# Patient Record
Sex: MALE | Race: BLACK OR AFRICAN AMERICAN | NOT HISPANIC OR LATINO | Employment: OTHER | URBAN - METROPOLITAN AREA
[De-identification: names, ages, dates, MRNs, and addresses within clinical notes are randomized per-mention and may not be internally consistent; named-entity substitution may affect disease eponyms.]

---

## 2019-01-01 ENCOUNTER — HOSPITAL ENCOUNTER (OUTPATIENT)
Facility: HOSPITAL | Age: 69
Discharge: HOME | End: 2019-12-12
Attending: OPHTHALMOLOGY | Admitting: OPHTHALMOLOGY
Payer: MEDICARE

## 2019-01-01 ENCOUNTER — HOSPITAL ENCOUNTER (OUTPATIENT)
Dept: PREADMISSION TESTING | Facility: HOSPITAL | Age: 69
Discharge: HOME | End: 2019-12-09
Attending: OPHTHALMOLOGY
Payer: MEDICARE

## 2019-01-01 ENCOUNTER — ANESTHESIA (OUTPATIENT)
Dept: SURGERY | Facility: HOSPITAL | Age: 69
End: 2019-01-01
Payer: MEDICARE

## 2019-01-01 ENCOUNTER — ANESTHESIA EVENT (OUTPATIENT)
Dept: SURGERY | Facility: HOSPITAL | Age: 69
End: 2019-01-01
Payer: MEDICARE

## 2019-01-01 VITALS
RESPIRATION RATE: 18 BRPM | HEART RATE: 70 BPM | DIASTOLIC BLOOD PRESSURE: 85 MMHG | OXYGEN SATURATION: 99 % | SYSTOLIC BLOOD PRESSURE: 168 MMHG

## 2019-01-01 DIAGNOSIS — H26.8 OTHER CATARACT OF RIGHT EYE: Primary | ICD-10-CM

## 2019-01-01 DIAGNOSIS — H26.9 CATARACT, RIGHT EYE: ICD-10-CM

## 2019-01-01 PROCEDURE — 36000707: Performed by: OPHTHALMOLOGY

## 2019-01-01 PROCEDURE — 25000003 PHARM REV CODE 250

## 2019-01-01 PROCEDURE — 71000033 HC RECOVERY, INTIAL HOUR: Performed by: OPHTHALMOLOGY

## 2019-01-01 PROCEDURE — 37000008 HC ANESTHESIA 1ST 15 MINUTES: Performed by: OPHTHALMOLOGY

## 2019-01-01 PROCEDURE — 25000003 PHARM REV CODE 250: Performed by: OPHTHALMOLOGY

## 2019-01-01 PROCEDURE — 63600175 PHARM REV CODE 636 W HCPCS: Performed by: NURSE ANESTHETIST, CERTIFIED REGISTERED

## 2019-01-01 PROCEDURE — 37000009 HC ANESTHESIA EA ADD 15 MINS: Performed by: OPHTHALMOLOGY

## 2019-01-01 PROCEDURE — V2632 POST CHMBR INTRAOCULAR LENS: HCPCS | Performed by: OPHTHALMOLOGY

## 2019-01-01 PROCEDURE — 36000706: Performed by: OPHTHALMOLOGY

## 2019-01-01 PROCEDURE — 00142 ANES PX ON EYE LENS SURGERY: CPT | Mod: QZ,P3 | Performed by: NURSE ANESTHETIST, CERTIFIED REGISTERED

## 2019-01-01 DEVICE — IMPLANTABLE DEVICE: Type: IMPLANTABLE DEVICE | Site: EYE | Status: FUNCTIONAL

## 2019-01-01 RX ORDER — DIFLUPREDNATE OPHTHALMIC 0.5 MG/ML
1 EMULSION OPHTHALMIC
COMMUNITY

## 2019-01-01 RX ORDER — TETRACAINE HYDROCHLORIDE 5 MG/ML
1 SOLUTION OPHTHALMIC
Status: DISCONTINUED | OUTPATIENT
Start: 2019-01-01 | End: 2019-01-01 | Stop reason: HOSPADM

## 2019-01-01 RX ORDER — TETRACAINE HYDROCHLORIDE 5 MG/ML
1 SOLUTION OPHTHALMIC
Status: CANCELLED | OUTPATIENT
Start: 2019-01-01

## 2019-01-01 RX ORDER — AMLODIPINE AND BENAZEPRIL HYDROCHLORIDE 10; 20 MG/1; MG/1
1 CAPSULE ORAL DAILY
COMMUNITY

## 2019-01-01 RX ORDER — LIDOCAINE HCL/PF 100 MG/5ML
SYRINGE (ML) INTRAVENOUS
Status: DISCONTINUED | OUTPATIENT
Start: 2019-01-01 | End: 2019-01-01

## 2019-01-01 RX ORDER — LIDOCAINE HYDROCHLORIDE 20 MG/ML
JELLY TOPICAL ONCE
Status: COMPLETED | OUTPATIENT
Start: 2019-01-01 | End: 2019-01-01

## 2019-01-01 RX ORDER — PHENYLEPHRINE HYDROCHLORIDE 25 MG/ML
1 SOLUTION/ DROPS OPHTHALMIC
Status: CANCELLED | OUTPATIENT
Start: 2019-01-01 | End: 2019-01-01

## 2019-01-01 RX ORDER — PROPOFOL 10 MG/ML
INJECTION, EMULSION INTRAVENOUS
Status: DISCONTINUED | OUTPATIENT
Start: 2019-01-01 | End: 2019-01-01

## 2019-01-01 RX ORDER — CYCLOPENTOLATE HYDROCHLORIDE 10 MG/ML
1 SOLUTION/ DROPS OPHTHALMIC
Status: COMPLETED | OUTPATIENT
Start: 2019-01-01 | End: 2019-01-01

## 2019-01-01 RX ORDER — PHENYLEPHRINE HYDROCHLORIDE 25 MG/ML
1 SOLUTION/ DROPS OPHTHALMIC
Status: COMPLETED | OUTPATIENT
Start: 2019-01-01 | End: 2019-01-01

## 2019-01-01 RX ORDER — LIDOCAINE HYDROCHLORIDE 20 MG/ML
JELLY TOPICAL ONCE
Status: CANCELLED | OUTPATIENT
Start: 2019-01-01

## 2019-01-01 RX ORDER — CYCLOPENTOLATE HYDROCHLORIDE 10 MG/ML
1 SOLUTION/ DROPS OPHTHALMIC
Status: CANCELLED | OUTPATIENT
Start: 2019-01-01 | End: 2019-01-01

## 2019-01-01 RX ADMIN — CYCLOPENTOLATE HYDROCHLORIDE 1 DROP: 10 SOLUTION/ DROPS OPHTHALMIC at 08:12

## 2019-01-01 RX ADMIN — LIDOCAINE HYDROCHLORIDE 50 MG: 20 INJECTION, SOLUTION INTRAVENOUS at 10:12

## 2019-01-01 RX ADMIN — TETRACAINE HYDROCHLORIDE 1 DROP: 5 SOLUTION OPHTHALMIC at 08:12

## 2019-01-01 RX ADMIN — PHENYLEPHRINE HYDROCHLORIDE 1 DROP: 25 SOLUTION/ DROPS OPHTHALMIC at 08:12

## 2019-01-01 RX ADMIN — LIDOCAINE HYDROCHLORIDE 10 ML: 20 JELLY TOPICAL at 08:12

## 2019-01-01 RX ADMIN — PROPOFOL 30 MG: 10 INJECTION, EMULSION INTRAVENOUS at 10:12

## 2019-12-09 NOTE — DISCHARGE INSTRUCTIONS
Pre Admit Instructions    Day and Date of your Surgery :   Thursday 12/12/19    · Call your doctor if you become ill before your surgery  · Someone will call you between 1 p.m. And 5 p.m.the workday before the procedure to give you an arrival time       - 7 a.m. To 5 p.m. Enter through Patient Registration Main Lobby  · You must have a responsible  to bring you home    Do NOT eat or drink anything   past midnight before your procedure day    Please    · Do not wear makeup, jewelry, nail polish or body piercings  · Bring containers/solution for contacts, dentures, bridges - these and hearing aids will be removed before your procedure  · Do not bring cash, jewelry or valuables the day of your procedure   · No smoking at least 24 hours before your procedure  · Wear clothing that is comfortable and easy to take off and put on  · Do NOT shave for at least 5 days before your surgery      Information About Your Procedure  We would like to welcome you to [facility].      1. Cafeteria Meals: 7am to 10am; 11am to 1:30 pm; Dinner/Supper must may be ordered between 11:00 am and 4 pm from the [facility] [employer] After [employer] Menu. Food will be available to  between 5 pm and 6 pm. The kitchen phone extension is [phone].  2. After Checking in someone will escort you to 4th floor  3. Wear loose fitting clothes that button down the front.  The hospital will provide a gown.  4. Wash hair the night before surgery with your regular soap/shampoo.  5. [facility] is a non-smoking hospital.  NO SMOKING is allowed inside or outside of the hospital.  6. The nurse will review and follow the doctors orders.  The nurse will check vital signs, lungs and heart.  A nurse will begin putting eye drops every 10 minutes until the eyes are dilated (30 mins - 1 hr).    INFORMATION ON THE SURGERY  · The Nurse Vaca from surgery will bring you to a holding area by wheel chair.  · In the holding area, an IV,  heart monitor, and oxygen will be started.  · Medication jelly will be put in your eye to numb your eye and then surgery will begin.  · You will be awake during the surgery, so if pain is felt, let the physician know at that time.  · After surgery let someone know if you feel any pain.  · The physician will put a clear patch over your eye to allow time to heal.  The physician will remove patch at the office the following day.  · The IV will be discontinued after the procedure.  · You will return upstairs after the surgery.   INFORMATION After the Surgery  · Notify nurse if you feel any eye pain, headache, weakness or dizziness.  · You must have a nurse present for the first time up to the bathroom.  · Instructions will be given on home care at time of discharge.  · The following activities cause increased pressure to the surgery eye.  Please do not lift, strain, bend down to tie shoelaces,  objects on the floor, or lie on the side of the surgery until the physician instructs you differently.  · Follow home care instructions recommended by:     Dr. Holcomb  - GOALS FOR ONE DAY SURGERY:  Pain controlled by oral medication; tolerating liquids well; able to walk without feeling dizzy or weak; able to urinate without difficulty.  - PATIENT WILL NEED SOMEONE TO DRIVE HIM/HER HOME FOLLOWING SURGERY.  We do want your stay to be as pleasant as possible.  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

## 2019-12-12 PROBLEM — H26.9 CATARACT, RIGHT EYE: Status: ACTIVE | Noted: 2019-01-01

## 2019-12-12 PROBLEM — H26.9 CATARACT, RIGHT EYE: Status: RESOLVED | Noted: 2019-01-01 | Resolved: 2019-01-01

## 2019-12-12 NOTE — OP NOTE
DATE OF PROCEDURE:  12/12/2019    PREOPERATIVE DIAGNOSIS:  Cataract, right eye.    POSTOPERATIVE DIAGNOSIS:  Cataract, right eye.    OPERATIVE PROCEDURES:  Phacoemulsification of right cataract and foldable   posterior chamber lens implant, right eye.    SURGEON:  Alcides Holcomb M.D.    ANESTHESIA:  Topical.    PROCEDURE IN DETAIL:  The patient is brought to the Operative Suite and placed   on a table in the supine position.  Prior to arrival, the right pupil had been   dilated with Jaison-Synephrine and Cyclogyl.  Topical anesthesia had been achieved   using 2% Xylocaine Gel applied 15 minutes prior to arrival.  This was augmented   by more 2% Xylocaine Gel on the table.  The right hemifacial area was prepped   and draped in the usual sterile fashion.  The remainder of the procedure took   place using a Zeiss operating microscope.    A lid speculum was placed under the right lid and the lid was retracted.  The   steepest corneal axis had been previously determined and a clear corneal   incision was made at this site.  A limbal clear corneal groove set at 200   microns was made for a length of 2.75 mm.  A corneal tunnel was made into clear   cornea and the anterior chamber was entered with a keratome for a width of 2.75   mm.  The anterior chamber was formed with viscoelastic.  An anterior   capsulorrhexis was carried out with a bent 25-gauge needle.  Hydrodissection of   the lens nucleus was achieved.  The lens nucleus was removed with   phacoemulsification.  The remainder of the cortical cataract material was   aspirated with an irrigation and aspiration tip.  Viscoelastic was used to fill   the anterior chamber and capsular bag.  An intraocular lens was inserted into   the capsular bag in the usual fashion.  The viscoelastic was aspirated and the   anterior chamber was again reformed with balanced salt solution.  The corneal   wound was tested to be free of leak.  After the lid speculum was removed, the   eye was  inspected and the anterior chamber was deep and formed, and the wound   was sealed.  The patient was referred to the Recovery Room in apparent good   condition having tolerated the procedure well.      NICOLASA/IN  dd: 12/12/2019 10:37:52 (CST)  td: 12/12/2019 12:25:39 (CST)  Doc ID   #[id number]  Job ID #[id number]    CC:

## 2019-12-12 NOTE — BRIEF OP NOTE
[facility]  Brief Operative Note     SUMMARY     Surgery Date: 12/12/2019     Surgeon(s) and Role:     * Alcides Holcomb MD - Primary    Assisting Surgeon: None    Pre-op Diagnosis:  Cataract, right eye [H26.9]    Post-op Diagnosis:  Post-Op Diagnosis Codes:     * Cataract, right eye [H26.9]    Procedure(s) (LRB):  PHACOEMULSIFICATION, CATARACT (Right)  EXTRACTION, CATARACT, WITH IOL INSERTION (Right)    Anesthesia: Monitor Anesthesia Care    Description of the findings of the procedure:     Findings/Key Components:     Estimated Blood Loss: * No values recorded between 12/12/2019 10:19 AM and 12/12/2019 10:38 AM *         Specimens:   Specimen (12h ago, onward)    None          Discharge Note    SUMMARY     Admit Date: 12/12/2019    Discharge Date and Time:  12/12/2019 10:40 AM    Hospital Course (synopsis of major diagnoses, care, treatment, and services provided during the course of the hospital stay):      Final Diagnosis: Post-Op Diagnosis Codes:     * Cataract, right eye [H26.9]    Disposition: Home or Self Care    Follow Up/Patient Instructions:     Medications:  Reconciled Home Medications:      Medication List      CONTINUE taking these medications    amlodipine-benazepril 10-20mg 10-20 mg per capsule  Commonly known as:  LOTREL  Take 1 capsule by mouth once daily.     CIPROFLOXACIN HCL OPHT  Apply to eye 4 (four) times daily.     Durezol 0.05 % Drop ophthalmic solution  Generic drug:  difluprednate  1 drop.     Ilevro 0.3 % Drps  Generic drug:  nepafenac  Apply to eye every evening.          No discharge procedures on file.

## 2019-12-12 NOTE — DISCHARGE INSTRUCTIONS
POST OP EYES    DAY OF SURGERY    Diet:  Feel free to eat all your favorite foods. No cooking the day of surgery.    Alcohol:  Yes, you may have one (1) cocktail with dinner.    Activity:  Make today a day of leisure, watch TV, take a walk, ride in the car,  sit on the porch. Rest frequently.    You may bend a bit to brush your teeth,  the newspaper, or eat your meals, but refrain from staying bent over for a long period of  time.  No heavy lifting/straining.     You may take a bath the day of surgery- NO SHOWER.     MD will advise on when you may resume driving.    SLEEP:  Sleep on two pillows the night of surgery. You may sleep on the      NON-OPERATIVE side. Wear eye shield at night and for naps.    MEDICATIONS:   Your home medications are to be taken as instructed by Dr. Gonsalves.  Please wait until after the first post op day before resuming blood thinners (Persantine, Ibuprofen, Coumadin, Aspirin, etc.)    Pain:  You may take two (2) Extra Strength Tylenol every four (4) hours.    Return to hospital:    1. Any obvious bleeding .    2. Temperature over 100.4 F    3. Pain not relieved by Tylenol.    4. Drainage or painful eye.    5. Redness or swelling around the eye      Remember to protect the eye by wearing the shield for at least one week at bedtime and by wearing your glasses or shield during the day.    Your follow up appointment is scheduled for tomorrow 12/13/19 at 815am  Please bring your discharge paperwork with you to your appointment.

## 2019-12-12 NOTE — ANESTHESIA POSTPROCEDURE EVALUATION
Anesthesia Post Evaluation    Patient: Atul Mendoza    Procedure(s) Performed: Procedure(s) (LRB):  PHACOEMULSIFICATION, CATARACT (Right)  EXTRACTION, CATARACT, WITH IOL INSERTION (Right)    Final Anesthesia Type: MAC    Patient location during evaluation: PACU  Patient participation: Yes- Able to Participate  Level of consciousness: awake and alert, oriented and awake  Post-procedure vital signs: reviewed and stable  Pain management: adequate  Airway patency: patent    PONV status at discharge: No PONV  Anesthetic complications: no      Cardiovascular status: blood pressure returned to baseline, hemodynamically stable and stable  Respiratory status: unassisted, spontaneous ventilation and room air  Hydration status: euvolemic  Follow-up not needed.          Vitals Value Taken Time   /84 12/12/2019  8:36 AM   Temp 36 12/12/2019 10:38 AM   Pulse 68 12/12/2019  8:27 AM   Resp 18 12/12/2019  8:27 AM   SpO2 94 % 12/12/2019  8:27 AM         No case tracking events are documented in the log.      Pain/Mag Score: Mag Score: 10 (12/12/2019  8:58 AM)

## 2019-12-12 NOTE — H&P
The patient has been examined and the H&P has been reviewed:  I concur with the findings and no changes have occurred since H&P was written.121/2/2019      Anesthesia/Surgery risks, benefits and alternative options discussed and understood by patient/family.

## 2019-12-12 NOTE — ANESTHESIA PREPROCEDURE EVALUATION
12/12/2019  Atul Mendoza is a 68 y.o., male.    Anesthesia Evaluation    I have reviewed the Patient Summary Reports.    I have reviewed the Nursing Notes.   I have reviewed the Medications.     Review of Systems  Anesthesia Hx:  No problems with previous Anesthesia    Social:  Non-Smoker, No Alcohol Use    Hematology/Oncology:  Hematology Normal   Oncology Normal     EENT/Dental:EENT/Dental Normal   Cardiovascular:   Exercise tolerance: good Hypertension    Pulmonary:  Pulmonary Normal    Renal/:  Renal/ Normal     Hepatic/GI:  Hepatic/GI Normal    Musculoskeletal:  Musculoskeletal Normal    Neurological:  Neurology Normal    Endocrine:  Endocrine Normal    Dermatological:  Skin Normal    Psych:  Psychiatric Normal           Physical Exam  General:  Well nourished    Airway/Jaw/Neck:  Airway Findings: Mouth Opening: Normal Tongue: Normal  General Airway Assessment: Adult  Mallampati: II  Jaw/Neck Findings:  Neck ROM: Normal ROM      Dental:  Dental Findings: In tact        Mental Status:  Mental Status Findings:  Cooperative         Anesthesia Plan  Type of Anesthesia, risks & benefits discussed:  Anesthesia Type:  MAC, general  Patient's Preference:   Intra-op Monitoring Plan: standard ASA monitors  Intra-op Monitoring Plan Comments:   Post Op Pain Control Plan: multimodal analgesia  Post Op Pain Control Plan Comments:   Induction:   IV  Beta Blocker:  Patient is not currently on a Beta-Blocker (No further documentation required).       Informed Consent: Patient understands risks and agrees with Anesthesia plan.  Questions answered. Anesthesia consent signed with patient.  ASA Score: 3     Day of Surgery Review of History & Physical: I have interviewed and examined the patient. I have reviewed the patient's H&P dated: 12/12/19. There are no significant changes.  H&P update referred to the surgeon.          Ready For Surgery From Anesthesia Perspective.

## 2020-01-01 ENCOUNTER — HOSPITAL ENCOUNTER (INPATIENT)
Facility: HOSPITAL | Age: 70
LOS: 8 days | DRG: 870 | End: 2020-08-12
Attending: PSYCHIATRY & NEUROLOGY | Admitting: PSYCHIATRY & NEUROLOGY
Payer: MEDICARE

## 2020-01-01 ENCOUNTER — HOSPITAL ENCOUNTER (EMERGENCY)
Facility: HOSPITAL | Age: 70
Discharge: HOME | End: 2020-07-15
Attending: SURGERY
Payer: MEDICARE

## 2020-01-01 VITALS
HEART RATE: 82 BPM | OXYGEN SATURATION: 97 % | SYSTOLIC BLOOD PRESSURE: 175 MMHG | TEMPERATURE: 100 F | WEIGHT: 171.94 LBS | DIASTOLIC BLOOD PRESSURE: 85 MMHG | RESPIRATION RATE: 18 BRPM

## 2020-01-01 VITALS
OXYGEN SATURATION: 74 % | DIASTOLIC BLOOD PRESSURE: 32 MMHG | RESPIRATION RATE: 7 BRPM | HEIGHT: 72 IN | HEART RATE: 60 BPM | TEMPERATURE: 96 F | BODY MASS INDEX: 23.68 KG/M2 | WEIGHT: 174.81 LBS | SYSTOLIC BLOOD PRESSURE: 54 MMHG

## 2020-01-01 DIAGNOSIS — A41.9 SEVERE SEPSIS: ICD-10-CM

## 2020-01-01 DIAGNOSIS — I63.81 LACUNAR STROKE: ICD-10-CM

## 2020-01-01 DIAGNOSIS — I63.9 ISCHEMIC STROKE: ICD-10-CM

## 2020-01-01 DIAGNOSIS — R25.1 SHAKING: Primary | ICD-10-CM

## 2020-01-01 DIAGNOSIS — G93.40 ACUTE ENCEPHALOPATHY: ICD-10-CM

## 2020-01-01 DIAGNOSIS — N17.9 AKI (ACUTE KIDNEY INJURY): Primary | ICD-10-CM

## 2020-01-01 DIAGNOSIS — R65.20 SEVERE SEPSIS: ICD-10-CM

## 2020-01-01 DIAGNOSIS — R41.82 ALTERED MENTAL STATUS: ICD-10-CM

## 2020-01-01 LAB
ABO + RH BLD: NORMAL
ALBUMIN SERPL BCP-MCNC: 1.2 G/DL (ref 3.5–5.2)
ALBUMIN SERPL BCP-MCNC: 1.3 G/DL (ref 3.5–5.2)
ALBUMIN SERPL BCP-MCNC: 1.4 G/DL (ref 3.5–5.2)
ALBUMIN SERPL BCP-MCNC: 2.8 G/DL (ref 3.5–5.2)
ALLENS TEST: ABNORMAL
ALP SERPL-CCNC: 47 U/L (ref 55–135)
ALP SERPL-CCNC: 51 U/L (ref 55–135)
ALP SERPL-CCNC: 54 U/L (ref 55–135)
ALP SERPL-CCNC: 54 U/L (ref 55–135)
ALP SERPL-CCNC: 58 U/L (ref 55–135)
ALP SERPL-CCNC: 60 U/L (ref 55–135)
ALP SERPL-CCNC: 63 U/L (ref 55–135)
ALP SERPL-CCNC: 64 U/L (ref 55–135)
ALP SERPL-CCNC: 68 U/L (ref 55–135)
ALT SERPL W/O P-5'-P-CCNC: 130 U/L (ref 10–44)
ALT SERPL W/O P-5'-P-CCNC: 134 U/L (ref 10–44)
ALT SERPL W/O P-5'-P-CCNC: 46 U/L (ref 10–44)
ALT SERPL W/O P-5'-P-CCNC: 48 U/L (ref 10–44)
ALT SERPL W/O P-5'-P-CCNC: 54 U/L (ref 10–44)
ALT SERPL W/O P-5'-P-CCNC: 63 U/L (ref 10–44)
ALT SERPL W/O P-5'-P-CCNC: 73 U/L (ref 10–44)
ALT SERPL W/O P-5'-P-CCNC: 87 U/L (ref 10–44)
ALT SERPL W/O P-5'-P-CCNC: 98 U/L (ref 10–44)
AMMONIA PLAS-SCNC: 64 UMOL/L (ref 10–50)
AMPHET+METHAMPHET UR QL: NEGATIVE
ANION GAP SERPL CALC-SCNC: 10 MMOL/L (ref 8–16)
ANION GAP SERPL CALC-SCNC: 10 MMOL/L (ref 8–16)
ANION GAP SERPL CALC-SCNC: 12 MMOL/L (ref 8–16)
ANION GAP SERPL CALC-SCNC: 12 MMOL/L (ref 8–16)
ANION GAP SERPL CALC-SCNC: 6 MMOL/L (ref 8–16)
ANION GAP SERPL CALC-SCNC: 7 MMOL/L (ref 8–16)
ANION GAP SERPL CALC-SCNC: 7 MMOL/L (ref 8–16)
ANION GAP SERPL CALC-SCNC: 8 MMOL/L (ref 8–16)
ANION GAP SERPL CALC-SCNC: 9 MMOL/L (ref 8–16)
ANISOCYTOSIS BLD QL SMEAR: SLIGHT
APTT BLDCRRT: 39.6 SEC (ref 21–32)
APTT BLDCRRT: 42.4 SEC (ref 21–32)
APTT BLDCRRT: 44.6 SEC (ref 21–32)
APTT BLDCRRT: 46.8 SEC (ref 21–32)
APTT BLDCRRT: 47 SEC (ref 21–32)
APTT BLDCRRT: 47.9 SEC (ref 21–32)
APTT BLDCRRT: 48.1 SEC (ref 21–32)
APTT BLDCRRT: 49.4 SEC (ref 21–32)
APTT BLDCRRT: 51.8 SEC (ref 21–32)
APTT BLDCRRT: 52.1 SEC (ref 21–32)
APTT BLDCRRT: 58.3 SEC (ref 21–32)
APTT BLDCRRT: 58.4 SEC (ref 21–32)
APTT BLDCRRT: 59 SEC (ref 21–32)
APTT BLDCRRT: 61.7 SEC (ref 21–32)
APTT BLDCRRT: 91.8 SEC (ref 21–32)
APTT BLDCRRT: 97.1 SEC (ref 21–32)
ASCENDING AORTA: 2.94 CM
AST SERPL-CCNC: 121 U/L (ref 10–40)
AST SERPL-CCNC: 162 U/L (ref 10–40)
AST SERPL-CCNC: 47 U/L (ref 10–40)
AST SERPL-CCNC: 48 U/L (ref 10–40)
AST SERPL-CCNC: 50 U/L (ref 10–40)
AST SERPL-CCNC: 53 U/L (ref 10–40)
AST SERPL-CCNC: 55 U/L (ref 10–40)
AST SERPL-CCNC: 72 U/L (ref 10–40)
AST SERPL-CCNC: 74 U/L (ref 10–40)
AV INDEX (PROSTH): 1.02
AV MEAN GRADIENT: 2 MMHG
AV PEAK GRADIENT: 2 MMHG
AV VALVE AREA: 3.36 CM2
AV VELOCITY RATIO: 0.96
BACTERIA #/AREA URNS AUTO: ABNORMAL /HPF
BACTERIA #/AREA URNS HPF: ABNORMAL /HPF
BACTERIA BLD CULT: ABNORMAL
BACTERIA BLD CULT: NORMAL
BACTERIA SPEC AEROBE CULT: ABNORMAL
BACTERIA SPEC AEROBE CULT: ABNORMAL
BACTERIA UR CULT: NORMAL
BARBITURATES UR QL SCN>200 NG/ML: NEGATIVE
BASO STIPL BLD QL SMEAR: ABNORMAL
BASOPHILS # BLD AUTO: 0 K/UL (ref 0–0.2)
BASOPHILS # BLD AUTO: 0 K/UL (ref 0–0.2)
BASOPHILS # BLD AUTO: 0.01 K/UL (ref 0–0.2)
BASOPHILS # BLD AUTO: 0.02 K/UL (ref 0–0.2)
BASOPHILS # BLD AUTO: 0.02 K/UL (ref 0–0.2)
BASOPHILS # BLD AUTO: ABNORMAL K/UL (ref 0–0.2)
BASOPHILS NFR BLD: 0 % (ref 0–1.9)
BASOPHILS NFR BLD: 0.1 % (ref 0–1.9)
BASOPHILS NFR BLD: 0.2 % (ref 0–1.9)
BASOPHILS NFR BLD: 0.4 % (ref 0–1.9)
BENZODIAZ UR QL SCN>200 NG/ML: NEGATIVE
BILIRUB SERPL-MCNC: 0.5 MG/DL (ref 0.1–1)
BILIRUB SERPL-MCNC: 0.5 MG/DL (ref 0.1–1)
BILIRUB SERPL-MCNC: 0.6 MG/DL (ref 0.1–1)
BILIRUB SERPL-MCNC: 0.7 MG/DL (ref 0.1–1)
BILIRUB SERPL-MCNC: 0.8 MG/DL (ref 0.1–1)
BILIRUB SERPL-MCNC: 0.9 MG/DL (ref 0.1–1)
BILIRUB SERPL-MCNC: 0.9 MG/DL (ref 0.1–1)
BILIRUB UR QL STRIP: NEGATIVE
BILIRUB UR QL STRIP: NEGATIVE
BLD GP AB SCN CELLS X3 SERPL QL: NORMAL
BLD PROD TYP BPU: NORMAL
BLOOD UNIT EXPIRATION DATE: NORMAL
BLOOD UNIT TYPE CODE: 6200
BLOOD UNIT TYPE: NORMAL
BSA FOR ECHO PROCEDURE: 1.97 M2
BUN SERPL-MCNC: 72 MG/DL (ref 8–23)
BUN SERPL-MCNC: 72 MG/DL (ref 8–23)
BUN SERPL-MCNC: 74 MG/DL (ref 8–23)
BUN SERPL-MCNC: 76 MG/DL (ref 8–23)
BUN SERPL-MCNC: 76 MG/DL (ref 8–23)
BUN SERPL-MCNC: 77 MG/DL (ref 8–23)
BUN SERPL-MCNC: 79 MG/DL (ref 8–23)
BUN SERPL-MCNC: 80 MG/DL (ref 8–23)
BUN SERPL-MCNC: 82 MG/DL (ref 8–23)
BUN SERPL-MCNC: 84 MG/DL (ref 8–23)
BUN SERPL-MCNC: 87 MG/DL (ref 8–23)
BUN SERPL-MCNC: 9 MG/DL (ref 8–23)
BUN SERPL-MCNC: 92 MG/DL (ref 8–23)
BUN SERPL-MCNC: 97 MG/DL (ref 8–23)
BUN SERPL-MCNC: 99 MG/DL (ref 8–23)
BURR CELLS BLD QL SMEAR: ABNORMAL
BZE UR QL SCN: NEGATIVE
CA-I BLDV-SCNC: 0.95 MMOL/L (ref 1.06–1.42)
CA-I BLDV-SCNC: 0.98 MMOL/L (ref 1.06–1.42)
CA-I BLDV-SCNC: 1.02 MMOL/L (ref 1.06–1.42)
CA-I BLDV-SCNC: 1.04 MMOL/L (ref 1.06–1.42)
CA-I BLDV-SCNC: 1.11 MMOL/L (ref 1.06–1.42)
CA-I BLDV-SCNC: 1.15 MMOL/L (ref 1.06–1.42)
CA-I BLDV-SCNC: 1.27 MMOL/L (ref 1.06–1.42)
CA-I BLDV-SCNC: 1.27 MMOL/L (ref 1.06–1.42)
CALCIUM SERPL-MCNC: 6.6 MG/DL (ref 8.7–10.5)
CALCIUM SERPL-MCNC: 6.6 MG/DL (ref 8.7–10.5)
CALCIUM SERPL-MCNC: 6.7 MG/DL (ref 8.7–10.5)
CALCIUM SERPL-MCNC: 6.7 MG/DL (ref 8.7–10.5)
CALCIUM SERPL-MCNC: 6.8 MG/DL (ref 8.7–10.5)
CALCIUM SERPL-MCNC: 6.9 MG/DL (ref 8.7–10.5)
CALCIUM SERPL-MCNC: 7 MG/DL (ref 8.7–10.5)
CALCIUM SERPL-MCNC: 7.2 MG/DL (ref 8.7–10.5)
CALCIUM SERPL-MCNC: 7.2 MG/DL (ref 8.7–10.5)
CALCIUM SERPL-MCNC: 7.7 MG/DL (ref 8.7–10.5)
CALCIUM SERPL-MCNC: 8.2 MG/DL (ref 8.7–10.5)
CALCIUM SERPL-MCNC: 8.3 MG/DL (ref 8.7–10.5)
CALCIUM SERPL-MCNC: 8.6 MG/DL (ref 8.7–10.5)
CALCIUM SERPL-MCNC: 8.6 MG/DL (ref 8.7–10.5)
CALCIUM SERPL-MCNC: 8.8 MG/DL (ref 8.7–10.5)
CANNABINOIDS UR QL SCN: NEGATIVE
CHLORIDE SERPL-SCNC: 100 MMOL/L (ref 95–110)
CHLORIDE SERPL-SCNC: 103 MMOL/L (ref 95–110)
CHLORIDE SERPL-SCNC: 104 MMOL/L (ref 95–110)
CHLORIDE SERPL-SCNC: 106 MMOL/L (ref 95–110)
CHLORIDE SERPL-SCNC: 108 MMOL/L (ref 95–110)
CHLORIDE SERPL-SCNC: 109 MMOL/L (ref 95–110)
CHLORIDE SERPL-SCNC: 109 MMOL/L (ref 95–110)
CHLORIDE SERPL-SCNC: 112 MMOL/L (ref 95–110)
CHLORIDE SERPL-SCNC: 113 MMOL/L (ref 95–110)
CHLORIDE SERPL-SCNC: 114 MMOL/L (ref 95–110)
CHLORIDE SERPL-SCNC: 119 MMOL/L (ref 95–110)
CHLORIDE SERPL-SCNC: 119 MMOL/L (ref 95–110)
CHLORIDE SERPL-SCNC: 123 MMOL/L (ref 95–110)
CHLORIDE SERPL-SCNC: 126 MMOL/L (ref 95–110)
CHOLEST SERPL-MCNC: 71 MG/DL (ref 120–199)
CHOLEST/HDLC SERPL: 10.1 {RATIO} (ref 2–5)
CLARITY UR REFRACT.AUTO: ABNORMAL
CLARITY UR: CLEAR
CO2 SERPL-SCNC: 19 MMOL/L (ref 23–29)
CO2 SERPL-SCNC: 19 MMOL/L (ref 23–29)
CO2 SERPL-SCNC: 20 MMOL/L (ref 23–29)
CO2 SERPL-SCNC: 21 MMOL/L (ref 23–29)
CO2 SERPL-SCNC: 22 MMOL/L (ref 23–29)
CO2 SERPL-SCNC: 23 MMOL/L (ref 23–29)
CO2 SERPL-SCNC: 24 MMOL/L (ref 23–29)
CODING SYSTEM: NORMAL
COLOR UR AUTO: YELLOW
COLOR UR: YELLOW
CREAT SERPL-MCNC: 1.4 MG/DL (ref 0.5–1.4)
CREAT SERPL-MCNC: 2 MG/DL (ref 0.5–1.4)
CREAT SERPL-MCNC: 2 MG/DL (ref 0.5–1.4)
CREAT SERPL-MCNC: 2.2 MG/DL (ref 0.5–1.4)
CREAT SERPL-MCNC: 2.3 MG/DL (ref 0.5–1.4)
CREAT SERPL-MCNC: 2.3 MG/DL (ref 0.5–1.4)
CREAT SERPL-MCNC: 2.4 MG/DL (ref 0.5–1.4)
CREAT SERPL-MCNC: 2.4 MG/DL (ref 0.5–1.4)
CREAT SERPL-MCNC: 2.5 MG/DL (ref 0.5–1.4)
CREAT SERPL-MCNC: 2.8 MG/DL (ref 0.5–1.4)
CREAT SERPL-MCNC: 2.9 MG/DL (ref 0.5–1.4)
CREAT SERPL-MCNC: 3.1 MG/DL (ref 0.5–1.4)
CREAT SERPL-MCNC: 3.6 MG/DL (ref 0.5–1.4)
CREAT SERPL-MCNC: 4 MG/DL (ref 0.5–1.4)
CREAT SERPL-MCNC: 4.3 MG/DL (ref 0.5–1.4)
CREAT UR-MCNC: 184.8 MG/DL (ref 23–375)
CV ECHO LV RWT: 0.54 CM
DACRYOCYTES BLD QL SMEAR: ABNORMAL
DACRYOCYTES BLD QL SMEAR: ABNORMAL
DELSYS: ABNORMAL
DEPRECATED SRA 0.1U/ML PORCINE HEPARIN S: 0 %
DEPRECATED SRA 100U/ML PORCINE HEPARIN S: 0 %
DIFFERENTIAL METHOD: ABNORMAL
DISPENSE STATUS: NORMAL
DOHLE BOD BLD QL SMEAR: PRESENT
DOP CALC AO PEAK VEL: 0.77 M/S
DOP CALC AO VTI: 15.43 CM
DOP CALC LVOT AREA: 3.3 CM2
DOP CALC LVOT DIAMETER: 2.05 CM
DOP CALC LVOT PEAK VEL: 0.74 M/S
DOP CALC LVOT STROKE VOLUME: 51.86 CM3
DOP CALCLVOT PEAK VEL VTI: 15.72 CM
E/A RATIO: 0.49
E/E' RATIO: 8.67 M/S
ECHO LV POSTERIOR WALL: 1.1 CM (ref 0.6–1.1)
EOSINOPHIL # BLD AUTO: 0 K/UL (ref 0–0.5)
EOSINOPHIL # BLD AUTO: 0.1 K/UL (ref 0–0.5)
EOSINOPHIL # BLD AUTO: 0.2 K/UL (ref 0–0.5)
EOSINOPHIL # BLD AUTO: 0.3 K/UL (ref 0–0.5)
EOSINOPHIL # BLD AUTO: 0.3 K/UL (ref 0–0.5)
EOSINOPHIL # BLD AUTO: ABNORMAL K/UL (ref 0–0.5)
EOSINOPHIL NFR BLD: 0 % (ref 0–8)
EOSINOPHIL NFR BLD: 0.2 % (ref 0–8)
EOSINOPHIL NFR BLD: 1.4 % (ref 0–8)
EOSINOPHIL NFR BLD: 1.7 % (ref 0–8)
EOSINOPHIL NFR BLD: 2.3 % (ref 0–8)
EOSINOPHIL NFR BLD: 3.6 % (ref 0–8)
ERYTHROCYTE [DISTWIDTH] IN BLOOD BY AUTOMATED COUNT: 13.9 % (ref 11.5–14.5)
ERYTHROCYTE [DISTWIDTH] IN BLOOD BY AUTOMATED COUNT: 15 % (ref 11.5–14.5)
ERYTHROCYTE [DISTWIDTH] IN BLOOD BY AUTOMATED COUNT: 15.1 % (ref 11.5–14.5)
ERYTHROCYTE [DISTWIDTH] IN BLOOD BY AUTOMATED COUNT: 15.1 % (ref 11.5–14.5)
ERYTHROCYTE [DISTWIDTH] IN BLOOD BY AUTOMATED COUNT: 15.2 % (ref 11.5–14.5)
ERYTHROCYTE [DISTWIDTH] IN BLOOD BY AUTOMATED COUNT: 15.2 % (ref 11.5–14.5)
ERYTHROCYTE [DISTWIDTH] IN BLOOD BY AUTOMATED COUNT: 15.3 % (ref 11.5–14.5)
ERYTHROCYTE [DISTWIDTH] IN BLOOD BY AUTOMATED COUNT: 15.4 % (ref 11.5–14.5)
ERYTHROCYTE [DISTWIDTH] IN BLOOD BY AUTOMATED COUNT: 15.9 % (ref 11.5–14.5)
ERYTHROCYTE [DISTWIDTH] IN BLOOD BY AUTOMATED COUNT: 16.3 % (ref 11.5–14.5)
ERYTHROCYTE [DISTWIDTH] IN BLOOD BY AUTOMATED COUNT: 16.6 % (ref 11.5–14.5)
ERYTHROCYTE [SEDIMENTATION RATE] IN BLOOD BY WESTERGREN METHOD: 12 MM/H
ERYTHROCYTE [SEDIMENTATION RATE] IN BLOOD BY WESTERGREN METHOD: 22 MM/H
EST. GFR  (AFRICAN AMERICAN): 15.4 ML/MIN/1.73 M^2
EST. GFR  (AFRICAN AMERICAN): 16.8 ML/MIN/1.73 M^2
EST. GFR  (AFRICAN AMERICAN): 19.1 ML/MIN/1.73 M^2
EST. GFR  (AFRICAN AMERICAN): 22.8 ML/MIN/1.73 M^2
EST. GFR  (AFRICAN AMERICAN): 24.7 ML/MIN/1.73 M^2
EST. GFR  (AFRICAN AMERICAN): 25.8 ML/MIN/1.73 M^2
EST. GFR  (AFRICAN AMERICAN): 29.6 ML/MIN/1.73 M^2
EST. GFR  (AFRICAN AMERICAN): 31.1 ML/MIN/1.73 M^2
EST. GFR  (AFRICAN AMERICAN): 31.1 ML/MIN/1.73 M^2
EST. GFR  (AFRICAN AMERICAN): 32.7 ML/MIN/1.73 M^2
EST. GFR  (AFRICAN AMERICAN): 32.7 ML/MIN/1.73 M^2
EST. GFR  (AFRICAN AMERICAN): 34.6 ML/MIN/1.73 M^2
EST. GFR  (AFRICAN AMERICAN): 38.8 ML/MIN/1.73 M^2
EST. GFR  (AFRICAN AMERICAN): 38.8 ML/MIN/1.73 M^2
EST. GFR  (AFRICAN AMERICAN): 60 ML/MIN/1.73 M^2
EST. GFR  (NON AFRICAN AMERICAN): 13.3 ML/MIN/1.73 M^2
EST. GFR  (NON AFRICAN AMERICAN): 14.5 ML/MIN/1.73 M^2
EST. GFR  (NON AFRICAN AMERICAN): 16.5 ML/MIN/1.73 M^2
EST. GFR  (NON AFRICAN AMERICAN): 19.7 ML/MIN/1.73 M^2
EST. GFR  (NON AFRICAN AMERICAN): 21.4 ML/MIN/1.73 M^2
EST. GFR  (NON AFRICAN AMERICAN): 22.3 ML/MIN/1.73 M^2
EST. GFR  (NON AFRICAN AMERICAN): 25.6 ML/MIN/1.73 M^2
EST. GFR  (NON AFRICAN AMERICAN): 26.9 ML/MIN/1.73 M^2
EST. GFR  (NON AFRICAN AMERICAN): 26.9 ML/MIN/1.73 M^2
EST. GFR  (NON AFRICAN AMERICAN): 28.3 ML/MIN/1.73 M^2
EST. GFR  (NON AFRICAN AMERICAN): 28.3 ML/MIN/1.73 M^2
EST. GFR  (NON AFRICAN AMERICAN): 29.9 ML/MIN/1.73 M^2
EST. GFR  (NON AFRICAN AMERICAN): 33.5 ML/MIN/1.73 M^2
EST. GFR  (NON AFRICAN AMERICAN): 33.5 ML/MIN/1.73 M^2
EST. GFR  (NON AFRICAN AMERICAN): 52 ML/MIN/1.73 M^2
ESTIMATED AVG GLUCOSE: 126 MG/DL (ref 68–131)
FACT X PPP CHRO-ACNC: <0.1 IU/ML (ref 0.3–0.7)
FIBRINOGEN PPP-MCNC: 367 MG/DL (ref 182–366)
FIO2: 35
FIO2: 40
FIO2: 40
FRACTIONAL SHORTENING: 36 % (ref 28–44)
GIANT PLATELETS BLD QL SMEAR: PRESENT
GLUCOSE SERPL-MCNC: 104 MG/DL (ref 70–110)
GLUCOSE SERPL-MCNC: 158 MG/DL (ref 70–110)
GLUCOSE SERPL-MCNC: 158 MG/DL (ref 70–110)
GLUCOSE SERPL-MCNC: 169 MG/DL (ref 70–110)
GLUCOSE SERPL-MCNC: 169 MG/DL (ref 70–110)
GLUCOSE SERPL-MCNC: 170 MG/DL (ref 70–110)
GLUCOSE SERPL-MCNC: 204 MG/DL (ref 70–110)
GLUCOSE SERPL-MCNC: 212 MG/DL (ref 70–110)
GLUCOSE SERPL-MCNC: 220 MG/DL (ref 70–110)
GLUCOSE SERPL-MCNC: 239 MG/DL (ref 70–110)
GLUCOSE SERPL-MCNC: 259 MG/DL (ref 70–110)
GLUCOSE SERPL-MCNC: 259 MG/DL (ref 70–110)
GLUCOSE SERPL-MCNC: 263 MG/DL (ref 70–110)
GLUCOSE SERPL-MCNC: 293 MG/DL (ref 70–110)
GLUCOSE SERPL-MCNC: 88 MG/DL (ref 70–110)
GLUCOSE SERPL-MCNC: 89 MG/DL (ref 70–110)
GLUCOSE SERPL-MCNC: 95 MG/DL (ref 70–110)
GLUCOSE UR QL STRIP: ABNORMAL
GLUCOSE UR QL STRIP: NEGATIVE
GRAM STN SPEC: ABNORMAL
HBA1C MFR BLD HPLC: 6 % (ref 4–5.6)
HCO3 UR-SCNC: 17 MMOL/L (ref 24–28)
HCO3 UR-SCNC: 19.1 MMOL/L (ref 24–28)
HCO3 UR-SCNC: 19.2 MMOL/L (ref 24–28)
HCO3 UR-SCNC: 19.5 MMOL/L (ref 24–28)
HCO3 UR-SCNC: 20.1 MMOL/L (ref 24–28)
HCO3 UR-SCNC: 20.5 MMOL/L (ref 24–28)
HCO3 UR-SCNC: 21.4 MMOL/L (ref 24–28)
HCO3 UR-SCNC: 21.8 MMOL/L (ref 24–28)
HCO3 UR-SCNC: 25.2 MMOL/L (ref 24–28)
HCT VFR BLD AUTO: 31 % (ref 40–54)
HCT VFR BLD AUTO: 31.2 % (ref 40–54)
HCT VFR BLD AUTO: 32.2 % (ref 40–54)
HCT VFR BLD AUTO: 32.6 % (ref 40–54)
HCT VFR BLD AUTO: 33.4 % (ref 40–54)
HCT VFR BLD AUTO: 33.6 % (ref 40–54)
HCT VFR BLD AUTO: 33.9 % (ref 40–54)
HCT VFR BLD AUTO: 33.9 % (ref 40–54)
HCT VFR BLD AUTO: 34.7 % (ref 40–54)
HCT VFR BLD AUTO: 34.7 % (ref 40–54)
HCT VFR BLD AUTO: 44.3 % (ref 40–54)
HDLC SERPL-MCNC: 7 MG/DL (ref 40–75)
HDLC SERPL: 9.9 % (ref 20–50)
HEPARIN INDUCED THROMBOCYTOPENIA: 0.29 OD (ref 0–0.4)
HGB BLD-MCNC: 10.3 G/DL (ref 14–18)
HGB BLD-MCNC: 10.5 G/DL (ref 14–18)
HGB BLD-MCNC: 10.7 G/DL (ref 14–18)
HGB BLD-MCNC: 11 G/DL (ref 14–18)
HGB BLD-MCNC: 11 G/DL (ref 14–18)
HGB BLD-MCNC: 11.1 G/DL (ref 14–18)
HGB BLD-MCNC: 11.5 G/DL (ref 14–18)
HGB BLD-MCNC: 11.6 G/DL (ref 14–18)
HGB BLD-MCNC: 11.7 G/DL (ref 14–18)
HGB BLD-MCNC: 11.7 G/DL (ref 14–18)
HGB BLD-MCNC: 15.2 G/DL (ref 14–18)
HGB UR QL STRIP: ABNORMAL
HGB UR QL STRIP: ABNORMAL
HYPOCHROMIA BLD QL SMEAR: ABNORMAL
IMM GRANULOCYTES # BLD AUTO: 0.01 K/UL (ref 0–0.04)
IMM GRANULOCYTES # BLD AUTO: 0.02 K/UL (ref 0–0.04)
IMM GRANULOCYTES # BLD AUTO: 0.03 K/UL (ref 0–0.04)
IMM GRANULOCYTES # BLD AUTO: 0.04 K/UL (ref 0–0.04)
IMM GRANULOCYTES # BLD AUTO: 0.04 K/UL (ref 0–0.04)
IMM GRANULOCYTES # BLD AUTO: 0.05 K/UL (ref 0–0.04)
IMM GRANULOCYTES # BLD AUTO: 0.08 K/UL (ref 0–0.04)
IMM GRANULOCYTES # BLD AUTO: ABNORMAL K/UL (ref 0–0.04)
IMM GRANULOCYTES NFR BLD AUTO: 0.2 % (ref 0–0.5)
IMM GRANULOCYTES NFR BLD AUTO: 0.3 % (ref 0–0.5)
IMM GRANULOCYTES NFR BLD AUTO: 0.4 % (ref 0–0.5)
IMM GRANULOCYTES NFR BLD AUTO: 0.4 % (ref 0–0.5)
IMM GRANULOCYTES NFR BLD AUTO: 0.5 % (ref 0–0.5)
IMM GRANULOCYTES NFR BLD AUTO: 0.7 % (ref 0–0.5)
IMM GRANULOCYTES NFR BLD AUTO: 0.7 % (ref 0–0.5)
IMM GRANULOCYTES NFR BLD AUTO: ABNORMAL % (ref 0–0.5)
INR PPP: 2.4 (ref 0.8–1.2)
INR PPP: 2.7 (ref 0.8–1.2)
INR PPP: 2.7 (ref 0.8–1.2)
INR PPP: 2.9 (ref 0.8–1.2)
INR PPP: 3 (ref 0.8–1.2)
INR PPP: 3.3 (ref 0.8–1.2)
INTERVENTRICULAR SEPTUM: 1.1 CM (ref 0.6–1.1)
KETONES UR QL STRIP: NEGATIVE
KETONES UR QL STRIP: NEGATIVE
LA MAJOR: 4.2 CM
LA MINOR: 4.2 CM
LA WIDTH: 3.48 CM
LACTATE SERPL-SCNC: 1.4 MMOL/L (ref 0.5–2.2)
LACTATE SERPL-SCNC: 1.7 MMOL/L (ref 0.5–2.2)
LACTATE SERPL-SCNC: 1.7 MMOL/L (ref 0.5–2.2)
LACTATE SERPL-SCNC: 1.9 MMOL/L (ref 0.5–2.2)
LDLC SERPL CALC-MCNC: 38.6 MG/DL (ref 63–159)
LEFT ATRIUM SIZE: 3.23 CM
LEFT ATRIUM VOLUME INDEX: 20.3 ML/M2
LEFT ATRIUM VOLUME: 40.13 CM3
LEFT INTERNAL DIMENSION IN SYSTOLE: 2.64 CM (ref 2.1–4)
LEFT VENTRICLE DIASTOLIC VOLUME INDEX: 22.33 ML/M2
LEFT VENTRICLE DIASTOLIC VOLUME: 44.17 ML
LEFT VENTRICLE MASS INDEX: 76 G/M2
LEFT VENTRICLE SYSTOLIC VOLUME INDEX: 12.9 ML/M2
LEFT VENTRICLE SYSTOLIC VOLUME: 25.6 ML
LEFT VENTRICULAR INTERNAL DIMENSION IN DIASTOLE: 4.1 CM (ref 3.5–6)
LEFT VENTRICULAR MASS: 151.3 G
LEUKOCYTE ESTERASE UR QL STRIP: ABNORMAL
LEUKOCYTE ESTERASE UR QL STRIP: NEGATIVE
LIPASE SERPL-CCNC: 127 U/L (ref 4–60)
LV LATERAL E/E' RATIO: 7.8 M/S
LV SEPTAL E/E' RATIO: 9.75 M/S
LYMPHOCYTES # BLD AUTO: 0.2 K/UL (ref 1–4.8)
LYMPHOCYTES # BLD AUTO: 0.3 K/UL (ref 1–4.8)
LYMPHOCYTES # BLD AUTO: 0.5 K/UL (ref 1–4.8)
LYMPHOCYTES # BLD AUTO: 0.5 K/UL (ref 1–4.8)
LYMPHOCYTES # BLD AUTO: 0.8 K/UL (ref 1–4.8)
LYMPHOCYTES # BLD AUTO: 1 K/UL (ref 1–4.8)
LYMPHOCYTES # BLD AUTO: ABNORMAL K/UL (ref 1–4.8)
LYMPHOCYTES NFR BLD: 3.5 % (ref 18–48)
LYMPHOCYTES NFR BLD: 3.7 % (ref 18–48)
LYMPHOCYTES NFR BLD: 31.8 % (ref 18–48)
LYMPHOCYTES NFR BLD: 4 % (ref 18–48)
LYMPHOCYTES NFR BLD: 4 % (ref 18–48)
LYMPHOCYTES NFR BLD: 4.7 % (ref 18–48)
LYMPHOCYTES NFR BLD: 4.8 % (ref 18–48)
LYMPHOCYTES NFR BLD: 6.7 % (ref 18–48)
LYMPHOCYTES NFR BLD: 7.3 % (ref 18–48)
LYMPHOCYTES NFR BLD: 8.9 % (ref 18–48)
LYMPHOCYTES NFR BLD: 9 % (ref 18–48)
MAGNESIUM SERPL-MCNC: 2.3 MG/DL (ref 1.6–2.6)
MAGNESIUM SERPL-MCNC: 2.4 MG/DL (ref 1.6–2.6)
MAGNESIUM SERPL-MCNC: 2.5 MG/DL (ref 1.6–2.6)
MCH RBC QN AUTO: 32.5 PG (ref 27–31)
MCH RBC QN AUTO: 32.6 PG (ref 27–31)
MCH RBC QN AUTO: 32.8 PG (ref 27–31)
MCH RBC QN AUTO: 32.9 PG (ref 27–31)
MCH RBC QN AUTO: 33 PG (ref 27–31)
MCH RBC QN AUTO: 33 PG (ref 27–31)
MCH RBC QN AUTO: 33.1 PG (ref 27–31)
MCH RBC QN AUTO: 33.3 PG (ref 27–31)
MCH RBC QN AUTO: 33.3 PG (ref 27–31)
MCHC RBC AUTO-ENTMCNC: 32.7 G/DL (ref 32–36)
MCHC RBC AUTO-ENTMCNC: 33.2 G/DL (ref 32–36)
MCHC RBC AUTO-ENTMCNC: 33.4 G/DL (ref 32–36)
MCHC RBC AUTO-ENTMCNC: 33.7 G/DL (ref 32–36)
MCHC RBC AUTO-ENTMCNC: 33.9 G/DL (ref 32–36)
MCHC RBC AUTO-ENTMCNC: 34.3 G/DL (ref 32–36)
MCHC RBC AUTO-ENTMCNC: 34.5 G/DL (ref 32–36)
MCV RBC AUTO: 101 FL (ref 82–98)
MCV RBC AUTO: 97 FL (ref 82–98)
MCV RBC AUTO: 98 FL (ref 82–98)
METHADONE UR QL SCN>300 NG/ML: NEGATIVE
MICROSCOPIC COMMENT: ABNORMAL
MICROSCOPIC COMMENT: ABNORMAL
MIN VOL: 12.2
MODE: ABNORMAL
MONOCYTES # BLD AUTO: 0.3 K/UL (ref 0.3–1)
MONOCYTES # BLD AUTO: 0.4 K/UL (ref 0.3–1)
MONOCYTES # BLD AUTO: 0.5 K/UL (ref 0.3–1)
MONOCYTES # BLD AUTO: 0.6 K/UL (ref 0.3–1)
MONOCYTES # BLD AUTO: 0.7 K/UL (ref 0.3–1)
MONOCYTES # BLD AUTO: ABNORMAL K/UL (ref 0.3–1)
MONOCYTES NFR BLD: 15.9 % (ref 4–15)
MONOCYTES NFR BLD: 4 % (ref 4–15)
MONOCYTES NFR BLD: 4.2 % (ref 4–15)
MONOCYTES NFR BLD: 4.2 % (ref 4–15)
MONOCYTES NFR BLD: 4.9 % (ref 4–15)
MONOCYTES NFR BLD: 5.4 % (ref 4–15)
MONOCYTES NFR BLD: 5.5 % (ref 4–15)
MONOCYTES NFR BLD: 5.9 % (ref 4–15)
MONOCYTES NFR BLD: 6 % (ref 4–15)
MONOCYTES NFR BLD: 6.4 % (ref 4–15)
MONOCYTES NFR BLD: 6.5 % (ref 4–15)
MV PEAK A VEL: 0.79 M/S
MV PEAK E VEL: 0.39 M/S
NEUTROPHILS # BLD AUTO: 1.3 K/UL (ref 1.8–7.7)
NEUTROPHILS # BLD AUTO: 12.7 K/UL (ref 1.8–7.7)
NEUTROPHILS # BLD AUTO: 5.6 K/UL (ref 1.8–7.7)
NEUTROPHILS # BLD AUTO: 5.7 K/UL (ref 1.8–7.7)
NEUTROPHILS # BLD AUTO: 5.7 K/UL (ref 1.8–7.7)
NEUTROPHILS # BLD AUTO: 6.2 K/UL (ref 1.8–7.7)
NEUTROPHILS # BLD AUTO: 6.3 K/UL (ref 1.8–7.7)
NEUTROPHILS # BLD AUTO: 7.5 K/UL (ref 1.8–7.7)
NEUTROPHILS # BLD AUTO: 8.8 K/UL (ref 1.8–7.7)
NEUTROPHILS # BLD AUTO: 9.7 K/UL (ref 1.8–7.7)
NEUTROPHILS NFR BLD: 51.5 % (ref 38–73)
NEUTROPHILS NFR BLD: 81.8 % (ref 38–73)
NEUTROPHILS NFR BLD: 83 % (ref 38–73)
NEUTROPHILS NFR BLD: 85.6 % (ref 38–73)
NEUTROPHILS NFR BLD: 86.2 % (ref 38–73)
NEUTROPHILS NFR BLD: 88 % (ref 38–73)
NEUTROPHILS NFR BLD: 88.4 % (ref 38–73)
NEUTROPHILS NFR BLD: 89.2 % (ref 38–73)
NEUTROPHILS NFR BLD: 89.4 % (ref 38–73)
NEUTROPHILS NFR BLD: 89.8 % (ref 38–73)
NEUTROPHILS NFR BLD: 91 % (ref 38–73)
NEUTS BAND NFR BLD MANUAL: 4 %
NITRITE UR QL STRIP: NEGATIVE
NITRITE UR QL STRIP: NEGATIVE
NON-SQ EPI CELLS #/AREA URNS AUTO: 2 /HPF
NONHDLC SERPL-MCNC: 64 MG/DL
NRBC BLD-RTO: 0 /100 WBC
OPIATES UR QL SCN: NEGATIVE
OVALOCYTES BLD QL SMEAR: ABNORMAL
PCO2 BLDA: 24 MMHG (ref 35–45)
PCO2 BLDA: 27.3 MMHG (ref 35–45)
PCO2 BLDA: 27.8 MMHG (ref 35–45)
PCO2 BLDA: 29.2 MMHG (ref 35–45)
PCO2 BLDA: 29.4 MMHG (ref 35–45)
PCO2 BLDA: 29.4 MMHG (ref 35–45)
PCO2 BLDA: 30.1 MMHG (ref 35–45)
PCO2 BLDA: 31.1 MMHG (ref 35–45)
PCO2 BLDA: 57.6 MMHG (ref 35–45)
PCP UR QL SCN>25 NG/ML: NEGATIVE
PEEP: 5
PH SMN: 7.25 [PH] (ref 7.35–7.45)
PH SMN: 7.42 [PH] (ref 7.35–7.45)
PH SMN: 7.45 [PH] (ref 7.35–7.45)
PH SMN: 7.46 [PH] (ref 7.35–7.45)
PH SMN: 7.47 [PH] (ref 7.35–7.45)
PH SMN: 7.48 [PH] (ref 7.35–7.45)
PH SMN: 7.48 [PH] (ref 7.35–7.45)
PH UR STRIP: 5 [PH] (ref 5–8)
PH UR STRIP: 7 [PH] (ref 5–8)
PHOSPHATE SERPL-MCNC: 2.5 MG/DL (ref 2.7–4.5)
PHOSPHATE SERPL-MCNC: 2.5 MG/DL (ref 2.7–4.5)
PHOSPHATE SERPL-MCNC: 3 MG/DL (ref 2.7–4.5)
PHOSPHATE SERPL-MCNC: 3.3 MG/DL (ref 2.7–4.5)
PHOSPHATE SERPL-MCNC: 3.6 MG/DL (ref 2.7–4.5)
PHOSPHATE SERPL-MCNC: 3.7 MG/DL (ref 2.7–4.5)
PHOSPHATE SERPL-MCNC: 4 MG/DL (ref 2.7–4.5)
PHOSPHATE SERPL-MCNC: 4 MG/DL (ref 2.7–4.5)
PHOSPHATE SERPL-MCNC: 4.1 MG/DL (ref 2.7–4.5)
PHOSPHATE SERPL-MCNC: 4.1 MG/DL (ref 2.7–4.5)
PHOSPHATE SERPL-MCNC: 4.4 MG/DL (ref 2.7–4.5)
PHOSPHATE SERPL-MCNC: 4.5 MG/DL (ref 2.7–4.5)
PHOSPHATE SERPL-MCNC: 4.9 MG/DL (ref 2.7–4.5)
PHOSPHATE SERPL-MCNC: 6.2 MG/DL (ref 2.7–4.5)
PLATELET # BLD AUTO: 28 K/UL (ref 150–350)
PLATELET # BLD AUTO: 30 K/UL (ref 150–350)
PLATELET # BLD AUTO: 31 K/UL (ref 150–350)
PLATELET # BLD AUTO: 33 K/UL (ref 150–350)
PLATELET # BLD AUTO: 36 K/UL (ref 150–350)
PLATELET # BLD AUTO: 40 K/UL (ref 150–350)
PLATELET # BLD AUTO: 51 K/UL (ref 150–350)
PLATELET # BLD AUTO: 52 K/UL (ref 150–350)
PLATELET # BLD AUTO: 64 K/UL (ref 150–350)
PLATELET # BLD AUTO: 79 K/UL (ref 150–350)
PLATELET # BLD AUTO: 91 K/UL (ref 150–350)
PLATELET BLD QL SMEAR: ABNORMAL
PMV BLD AUTO: 11.1 FL (ref 9.2–12.9)
PMV BLD AUTO: 11.4 FL (ref 9.2–12.9)
PMV BLD AUTO: 11.7 FL (ref 9.2–12.9)
PMV BLD AUTO: 11.9 FL (ref 9.2–12.9)
PMV BLD AUTO: 12.1 FL (ref 9.2–12.9)
PMV BLD AUTO: 12.1 FL (ref 9.2–12.9)
PMV BLD AUTO: 12.3 FL (ref 9.2–12.9)
PMV BLD AUTO: 12.6 FL (ref 9.2–12.9)
PMV BLD AUTO: 13.6 FL (ref 9.2–12.9)
PMV BLD AUTO: 13.8 FL (ref 9.2–12.9)
PMV BLD AUTO: ABNORMAL FL (ref 9.2–12.9)
PO2 BLDA: 100 MMHG (ref 80–100)
PO2 BLDA: 110 MMHG (ref 80–100)
PO2 BLDA: 78 MMHG (ref 80–100)
PO2 BLDA: 80 MMHG (ref 80–100)
PO2 BLDA: 88 MMHG (ref 80–100)
PO2 BLDA: 89 MMHG (ref 80–100)
PO2 BLDA: 91 MMHG (ref 80–100)
PO2 BLDA: 91 MMHG (ref 80–100)
PO2 BLDA: ABNORMAL MMHG
POC BE: -2 MMOL/L
POC BE: -3 MMOL/L
POC BE: -4 MMOL/L
POC BE: -5 MMOL/L
POC BE: -7 MMOL/L
POC SATURATED O2: 95 % (ref 95–100)
POC SATURATED O2: 96 % (ref 95–100)
POC SATURATED O2: 97 % (ref 95–100)
POC SATURATED O2: 98 % (ref 95–100)
POC SATURATED O2: ABNORMAL %
POC TCO2: 18 MMOL/L (ref 23–27)
POC TCO2: 20 MMOL/L (ref 23–27)
POC TCO2: 21 MMOL/L (ref 23–27)
POC TCO2: 21 MMOL/L (ref 23–27)
POC TCO2: 22 MMOL/L (ref 23–27)
POC TCO2: 23 MMOL/L (ref 23–27)
POC TCO2: 27 MMOL/L (ref 23–27)
POCT GLUCOSE: 102 MG/DL (ref 70–110)
POCT GLUCOSE: 104 MG/DL (ref 70–110)
POCT GLUCOSE: 108 MG/DL (ref 70–110)
POCT GLUCOSE: 109 MG/DL (ref 70–110)
POCT GLUCOSE: 109 MG/DL (ref 70–110)
POCT GLUCOSE: 111 MG/DL (ref 70–110)
POCT GLUCOSE: 112 MG/DL (ref 70–110)
POCT GLUCOSE: 116 MG/DL (ref 70–110)
POCT GLUCOSE: 117 MG/DL (ref 70–110)
POCT GLUCOSE: 123 MG/DL (ref 70–110)
POCT GLUCOSE: 127 MG/DL (ref 70–110)
POCT GLUCOSE: 129 MG/DL (ref 70–110)
POCT GLUCOSE: 130 MG/DL (ref 70–110)
POCT GLUCOSE: 142 MG/DL (ref 70–110)
POCT GLUCOSE: 143 MG/DL (ref 70–110)
POCT GLUCOSE: 147 MG/DL (ref 70–110)
POCT GLUCOSE: 149 MG/DL (ref 70–110)
POCT GLUCOSE: 154 MG/DL (ref 70–110)
POCT GLUCOSE: 156 MG/DL (ref 70–110)
POCT GLUCOSE: 157 MG/DL (ref 70–110)
POCT GLUCOSE: 160 MG/DL (ref 70–110)
POCT GLUCOSE: 161 MG/DL (ref 70–110)
POCT GLUCOSE: 165 MG/DL (ref 70–110)
POCT GLUCOSE: 167 MG/DL (ref 70–110)
POCT GLUCOSE: 169 MG/DL (ref 70–110)
POCT GLUCOSE: 170 MG/DL (ref 70–110)
POCT GLUCOSE: 184 MG/DL (ref 70–110)
POCT GLUCOSE: 191 MG/DL (ref 70–110)
POCT GLUCOSE: 193 MG/DL (ref 70–110)
POCT GLUCOSE: 194 MG/DL (ref 70–110)
POCT GLUCOSE: 199 MG/DL (ref 70–110)
POCT GLUCOSE: 201 MG/DL (ref 70–110)
POCT GLUCOSE: 202 MG/DL (ref 70–110)
POCT GLUCOSE: 205 MG/DL (ref 70–110)
POCT GLUCOSE: 206 MG/DL (ref 70–110)
POCT GLUCOSE: 212 MG/DL (ref 70–110)
POCT GLUCOSE: 212 MG/DL (ref 70–110)
POCT GLUCOSE: 216 MG/DL (ref 70–110)
POCT GLUCOSE: 224 MG/DL (ref 70–110)
POCT GLUCOSE: 233 MG/DL (ref 70–110)
POCT GLUCOSE: 235 MG/DL (ref 70–110)
POCT GLUCOSE: 237 MG/DL (ref 70–110)
POCT GLUCOSE: 243 MG/DL (ref 70–110)
POCT GLUCOSE: 247 MG/DL (ref 70–110)
POCT GLUCOSE: 256 MG/DL (ref 70–110)
POCT GLUCOSE: 269 MG/DL (ref 70–110)
POCT GLUCOSE: 273 MG/DL (ref 70–110)
POCT GLUCOSE: 79 MG/DL (ref 70–110)
POCT GLUCOSE: 87 MG/DL (ref 70–110)
POCT GLUCOSE: 90 MG/DL (ref 70–110)
POCT GLUCOSE: 91 MG/DL (ref 70–110)
POCT GLUCOSE: 93 MG/DL (ref 70–110)
POCT GLUCOSE: 94 MG/DL (ref 70–110)
POIKILOCYTOSIS BLD QL SMEAR: SLIGHT
POLYCHROMASIA BLD QL SMEAR: ABNORMAL
POTASSIUM SERPL-SCNC: 2.7 MMOL/L (ref 3.5–5.1)
POTASSIUM SERPL-SCNC: 2.7 MMOL/L (ref 3.5–5.1)
POTASSIUM SERPL-SCNC: 3 MMOL/L (ref 3.5–5.1)
POTASSIUM SERPL-SCNC: 3.1 MMOL/L (ref 3.5–5.1)
POTASSIUM SERPL-SCNC: 3.2 MMOL/L (ref 3.5–5.1)
POTASSIUM SERPL-SCNC: 3.2 MMOL/L (ref 3.5–5.1)
POTASSIUM SERPL-SCNC: 3.4 MMOL/L (ref 3.5–5.1)
POTASSIUM SERPL-SCNC: 3.5 MMOL/L (ref 3.5–5.1)
POTASSIUM SERPL-SCNC: 3.8 MMOL/L (ref 3.5–5.1)
POTASSIUM SERPL-SCNC: 4 MMOL/L (ref 3.5–5.1)
PROT SERPL-MCNC: 5 G/DL (ref 6–8.4)
PROT SERPL-MCNC: 5.2 G/DL (ref 6–8.4)
PROT SERPL-MCNC: 5.5 G/DL (ref 6–8.4)
PROT SERPL-MCNC: 5.7 G/DL (ref 6–8.4)
PROT SERPL-MCNC: 5.7 G/DL (ref 6–8.4)
PROT SERPL-MCNC: 5.8 G/DL (ref 6–8.4)
PROT SERPL-MCNC: 8.1 G/DL (ref 6–8.4)
PROT UR QL STRIP: ABNORMAL
PROT UR QL STRIP: NEGATIVE
PROTHROMBIN TIME: 25.2 SEC (ref 9–12.5)
PROTHROMBIN TIME: 28.5 SEC (ref 9–12.5)
PROTHROMBIN TIME: 28.9 SEC (ref 9–12.5)
PROTHROMBIN TIME: 30.9 SEC (ref 9–12.5)
PROTHROMBIN TIME: 31.3 SEC (ref 9–12.5)
PROTHROMBIN TIME: 34.5 SEC (ref 9–12.5)
PS: 8
RA MAJOR: 4.35 CM
RA WIDTH: 3.24 CM
RBC # BLD AUTO: 3.17 M/UL (ref 4.6–6.2)
RBC # BLD AUTO: 3.18 M/UL (ref 4.6–6.2)
RBC # BLD AUTO: 3.28 M/UL (ref 4.6–6.2)
RBC # BLD AUTO: 3.33 M/UL (ref 4.6–6.2)
RBC # BLD AUTO: 3.34 M/UL (ref 4.6–6.2)
RBC # BLD AUTO: 3.41 M/UL (ref 4.6–6.2)
RBC # BLD AUTO: 3.47 M/UL (ref 4.6–6.2)
RBC # BLD AUTO: 3.51 M/UL (ref 4.6–6.2)
RBC # BLD AUTO: 3.56 M/UL (ref 4.6–6.2)
RBC # BLD AUTO: 3.57 M/UL (ref 4.6–6.2)
RBC # BLD AUTO: 4.56 M/UL (ref 4.6–6.2)
RBC #/AREA URNS AUTO: 32 /HPF (ref 0–4)
RBC #/AREA URNS HPF: 10 /HPF (ref 0–4)
RIGHT VENTRICULAR END-DIASTOLIC DIMENSION: 3.1 CM
SAMPLE: ABNORMAL
SCHISTOCYTES BLD QL SMEAR: ABNORMAL
SINUS: 2.94 CM
SITE: ABNORMAL
SODIUM SERPL-SCNC: 132 MMOL/L (ref 136–145)
SODIUM SERPL-SCNC: 134 MMOL/L (ref 136–145)
SODIUM SERPL-SCNC: 135 MMOL/L (ref 136–145)
SODIUM SERPL-SCNC: 136 MMOL/L (ref 136–145)
SODIUM SERPL-SCNC: 137 MMOL/L (ref 136–145)
SODIUM SERPL-SCNC: 137 MMOL/L (ref 136–145)
SODIUM SERPL-SCNC: 138 MMOL/L (ref 136–145)
SODIUM SERPL-SCNC: 141 MMOL/L (ref 136–145)
SODIUM SERPL-SCNC: 142 MMOL/L (ref 136–145)
SODIUM SERPL-SCNC: 143 MMOL/L (ref 136–145)
SODIUM SERPL-SCNC: 148 MMOL/L (ref 136–145)
SODIUM SERPL-SCNC: 148 MMOL/L (ref 136–145)
SODIUM SERPL-SCNC: 153 MMOL/L (ref 136–145)
SODIUM SERPL-SCNC: 154 MMOL/L (ref 136–145)
SODIUM SERPL-SCNC: 154 MMOL/L (ref 136–145)
SODIUM SERPL-SCNC: 156 MMOL/L (ref 136–145)
SODIUM SERPL-SCNC: 158 MMOL/L (ref 136–145)
SP GR UR STRIP: 1.01 (ref 1–1.03)
SP GR UR STRIP: 1.02 (ref 1–1.03)
SP02: 95
SPONT RATE: 19
SQUAMOUS #/AREA URNS AUTO: 1 /HPF
SRA PORCINE INTERP SER-IMP: NEGATIVE
STJ: 3 CM
TARGETS BLD QL SMEAR: ABNORMAL
TARGETS BLD QL SMEAR: ABNORMAL
TDI LATERAL: 0.05 M/S
TDI SEPTAL: 0.04 M/S
TDI: 0.05 M/S
TOXIC GRANULES BLD QL SMEAR: PRESENT
TOXICOLOGY INFORMATION: NORMAL
TRANS PLATPHERESIS VOL PATIENT: NORMAL ML
TRICUSPID ANNULAR PLANE SYSTOLIC EXCURSION: 2.4 CM
TRIGL SERPL-MCNC: 127 MG/DL (ref 30–150)
TROPONIN I SERPL DL<=0.01 NG/ML-MCNC: 1.71 NG/ML (ref 0–0.03)
TROPONIN I SERPL DL<=0.01 NG/ML-MCNC: 1.97 NG/ML (ref 0–0.03)
TROPONIN I SERPL DL<=0.01 NG/ML-MCNC: 2.1 NG/ML (ref 0–0.03)
TROPONIN I SERPL DL<=0.01 NG/ML-MCNC: 4.67 NG/ML (ref 0–0.03)
TSH SERPL DL<=0.005 MIU/L-ACNC: 2.23 UIU/ML (ref 0.4–4)
URN SPEC COLLECT METH UR: ABNORMAL
URN SPEC COLLECT METH UR: ABNORMAL
UROBILINOGEN UR STRIP-ACNC: NEGATIVE EU/DL
VANCOMYCIN SERPL-MCNC: 15.1 UG/ML
VANCOMYCIN SERPL-MCNC: 15.4 UG/ML
VOL: 649
VT: 0
VT: 500
VT: 500
VT: 550
WBC # BLD AUTO: 10.74 K/UL (ref 3.9–12.7)
WBC # BLD AUTO: 11.26 K/UL (ref 3.9–12.7)
WBC # BLD AUTO: 13.23 K/UL (ref 3.9–12.7)
WBC # BLD AUTO: 13.92 K/UL (ref 3.9–12.7)
WBC # BLD AUTO: 2.45 K/UL (ref 3.9–12.7)
WBC # BLD AUTO: 6.28 K/UL (ref 3.9–12.7)
WBC # BLD AUTO: 6.35 K/UL (ref 3.9–12.7)
WBC # BLD AUTO: 6.43 K/UL (ref 3.9–12.7)
WBC # BLD AUTO: 6.95 K/UL (ref 3.9–12.7)
WBC # BLD AUTO: 7.3 K/UL (ref 3.9–12.7)
WBC # BLD AUTO: 9.06 K/UL (ref 3.9–12.7)
WBC #/AREA URNS AUTO: 47 /HPF (ref 0–5)
WBC #/AREA URNS HPF: 0 /HPF (ref 0–5)
WBC CLUMPS UR QL AUTO: ABNORMAL
WBC TOXIC VACUOLES BLD QL SMEAR: PRESENT

## 2020-01-01 PROCEDURE — 83036 HEMOGLOBIN GLYCOSYLATED A1C: CPT

## 2020-01-01 PROCEDURE — 37799 UNLISTED PX VASCULAR SURGERY: CPT

## 2020-01-01 PROCEDURE — 99900026 HC AIRWAY MAINTENANCE (STAT)

## 2020-01-01 PROCEDURE — 80202 ASSAY OF VANCOMYCIN: CPT

## 2020-01-01 PROCEDURE — 82803 BLOOD GASES ANY COMBINATION: CPT

## 2020-01-01 PROCEDURE — 85520 HEPARIN ASSAY: CPT

## 2020-01-01 PROCEDURE — 99223 PR INITIAL HOSPITAL CARE,LEVL III: ICD-10-PCS | Mod: GC,,, | Performed by: INTERNAL MEDICINE

## 2020-01-01 PROCEDURE — 25000242 PHARM REV CODE 250 ALT 637 W/ HCPCS: Performed by: NURSE PRACTITIONER

## 2020-01-01 PROCEDURE — P9035 PLATELET PHERES LEUKOREDUCED: HCPCS

## 2020-01-01 PROCEDURE — 94640 AIRWAY INHALATION TREATMENT: CPT

## 2020-01-01 PROCEDURE — 85730 THROMBOPLASTIN TIME PARTIAL: CPT | Mod: 91

## 2020-01-01 PROCEDURE — 99291 CRITICAL CARE FIRST HOUR: CPT | Mod: GC,CR,, | Performed by: PSYCHIATRY & NEUROLOGY

## 2020-01-01 PROCEDURE — 63600175 PHARM REV CODE 636 W HCPCS: Performed by: PSYCHIATRY & NEUROLOGY

## 2020-01-01 PROCEDURE — 99900035 HC TECH TIME PER 15 MIN (STAT)

## 2020-01-01 PROCEDURE — 99233 SBSQ HOSP IP/OBS HIGH 50: CPT | Mod: ,,, | Performed by: INTERNAL MEDICINE

## 2020-01-01 PROCEDURE — 85025 COMPLETE CBC W/AUTO DIFF WBC: CPT

## 2020-01-01 PROCEDURE — 25000003 PHARM REV CODE 250: Performed by: NURSE PRACTITIONER

## 2020-01-01 PROCEDURE — 25000003 PHARM REV CODE 250

## 2020-01-01 PROCEDURE — 86901 BLOOD TYPING SEROLOGIC RH(D): CPT

## 2020-01-01 PROCEDURE — 94761 N-INVAS EAR/PLS OXIMETRY MLT: CPT

## 2020-01-01 PROCEDURE — 27200966 HC CLOSED SUCTION SYSTEM

## 2020-01-01 PROCEDURE — 63600175 PHARM REV CODE 636 W HCPCS

## 2020-01-01 PROCEDURE — 80069 RENAL FUNCTION PANEL: CPT

## 2020-01-01 PROCEDURE — 99292 CRITICAL CARE ADDL 30 MIN: CPT | Mod: GC,CR,, | Performed by: PSYCHIATRY & NEUROLOGY

## 2020-01-01 PROCEDURE — 83735 ASSAY OF MAGNESIUM: CPT

## 2020-01-01 PROCEDURE — 63600175 PHARM REV CODE 636 W HCPCS: Performed by: NURSE PRACTITIONER

## 2020-01-01 PROCEDURE — 80053 COMPREHEN METABOLIC PANEL: CPT

## 2020-01-01 PROCEDURE — 87040 BLOOD CULTURE FOR BACTERIA: CPT | Mod: 59

## 2020-01-01 PROCEDURE — 25000003 PHARM REV CODE 250: Performed by: STUDENT IN AN ORGANIZED HEALTH CARE EDUCATION/TRAINING PROGRAM

## 2020-01-01 PROCEDURE — 80053 COMPREHEN METABOLIC PANEL: CPT | Mod: 91

## 2020-01-01 PROCEDURE — 27000221 HC OXYGEN, UP TO 24 HOURS

## 2020-01-01 PROCEDURE — 25000003 PHARM REV CODE 250: Performed by: PSYCHIATRY & NEUROLOGY

## 2020-01-01 PROCEDURE — 82330 ASSAY OF CALCIUM: CPT

## 2020-01-01 PROCEDURE — 99291 CRITICAL CARE FIRST HOUR: CPT | Mod: GC,,, | Performed by: PSYCHIATRY & NEUROLOGY

## 2020-01-01 PROCEDURE — 85610 PROTHROMBIN TIME: CPT

## 2020-01-01 PROCEDURE — 85730 THROMBOPLASTIN TIME PARTIAL: CPT

## 2020-01-01 PROCEDURE — 86022 PLATELET ANTIBODIES: CPT | Mod: 91

## 2020-01-01 PROCEDURE — 20000000 HC ICU ROOM

## 2020-01-01 PROCEDURE — 99223 PR INITIAL HOSPITAL CARE,LEVL III: ICD-10-PCS | Mod: GC,,, | Performed by: PSYCHIATRY & NEUROLOGY

## 2020-01-01 PROCEDURE — 80307 DRUG TEST PRSMV CHEM ANLYZR: CPT

## 2020-01-01 PROCEDURE — 87077 CULTURE AEROBIC IDENTIFY: CPT

## 2020-01-01 PROCEDURE — 36415 COLL VENOUS BLD VENIPUNCTURE: CPT

## 2020-01-01 PROCEDURE — 87040 BLOOD CULTURE FOR BACTERIA: CPT

## 2020-01-01 PROCEDURE — 82800 BLOOD PH: CPT

## 2020-01-01 PROCEDURE — 99233 SBSQ HOSP IP/OBS HIGH 50: CPT | Mod: GC,,, | Performed by: PSYCHIATRY & NEUROLOGY

## 2020-01-01 PROCEDURE — 85025 COMPLETE CBC W/AUTO DIFF WBC: CPT | Mod: 91

## 2020-01-01 PROCEDURE — 99232 PR SUBSEQUENT HOSPITAL CARE,LEVL II: ICD-10-PCS | Mod: ,,, | Performed by: INTERNAL MEDICINE

## 2020-01-01 PROCEDURE — 81001 URINALYSIS AUTO W/SCOPE: CPT

## 2020-01-01 PROCEDURE — 84100 ASSAY OF PHOSPHORUS: CPT

## 2020-01-01 PROCEDURE — 94003 VENT MGMT INPAT SUBQ DAY: CPT

## 2020-01-01 PROCEDURE — 84443 ASSAY THYROID STIM HORMONE: CPT

## 2020-01-01 PROCEDURE — 99223 1ST HOSP IP/OBS HIGH 75: CPT | Mod: ,,, | Performed by: INTERNAL MEDICINE

## 2020-01-01 PROCEDURE — 99291 CRITICAL CARE FIRST HOUR: CPT | Mod: 25,,, | Performed by: NURSE PRACTITIONER

## 2020-01-01 PROCEDURE — 83605 ASSAY OF LACTIC ACID: CPT | Mod: 91

## 2020-01-01 PROCEDURE — 99223 1ST HOSP IP/OBS HIGH 75: CPT | Mod: GC,,, | Performed by: INTERNAL MEDICINE

## 2020-01-01 PROCEDURE — 99291 PR CRITICAL CARE, E/M 30-74 MINUTES: ICD-10-PCS | Mod: 25,,, | Performed by: NURSE PRACTITIONER

## 2020-01-01 PROCEDURE — 99291 PR CRITICAL CARE, E/M 30-74 MINUTES: ICD-10-PCS | Mod: GC,,, | Performed by: PSYCHIATRY & NEUROLOGY

## 2020-01-01 PROCEDURE — 95718 EEG PHYS/QHP 2-12 HR W/VEEG: CPT | Mod: ,,, | Performed by: PSYCHIATRY & NEUROLOGY

## 2020-01-01 PROCEDURE — 86022 PLATELET ANTIBODIES: CPT

## 2020-01-01 PROCEDURE — 94002 VENT MGMT INPAT INIT DAY: CPT

## 2020-01-01 PROCEDURE — 82962 GLUCOSE BLOOD TEST: CPT

## 2020-01-01 PROCEDURE — 84484 ASSAY OF TROPONIN QUANT: CPT

## 2020-01-01 PROCEDURE — 99233 PR SUBSEQUENT HOSPITAL CARE,LEVL III: ICD-10-PCS | Mod: ,,, | Performed by: INTERNAL MEDICINE

## 2020-01-01 PROCEDURE — 99497 ADVNCD CARE PLAN 30 MIN: CPT | Mod: 25,,, | Performed by: INTERNAL MEDICINE

## 2020-01-01 PROCEDURE — 25500020 PHARM REV CODE 255: Performed by: STUDENT IN AN ORGANIZED HEALTH CARE EDUCATION/TRAINING PROGRAM

## 2020-01-01 PROCEDURE — 99232 PR SUBSEQUENT HOSPITAL CARE,LEVL II: ICD-10-PCS | Mod: GC,,, | Performed by: INTERNAL MEDICINE

## 2020-01-01 PROCEDURE — 87086 URINE CULTURE/COLONY COUNT: CPT

## 2020-01-01 PROCEDURE — C9399 UNCLASSIFIED DRUGS OR BIOLOG: HCPCS | Performed by: PSYCHIATRY & NEUROLOGY

## 2020-01-01 PROCEDURE — 95718 PR EEG, W/VIDEO, CONT RECORD, I&R, 2-12 HRS: ICD-10-PCS | Mod: ,,, | Performed by: PSYCHIATRY & NEUROLOGY

## 2020-01-01 PROCEDURE — 99232 SBSQ HOSP IP/OBS MODERATE 35: CPT | Mod: ,,, | Performed by: INTERNAL MEDICINE

## 2020-01-01 PROCEDURE — 99497 PR ADVNCD CARE PLAN 30 MIN: ICD-10-PCS | Mod: 25,,, | Performed by: INTERNAL MEDICINE

## 2020-01-01 PROCEDURE — 99283 EMERGENCY DEPT VISIT LOW MDM: CPT | Mod: 25

## 2020-01-01 PROCEDURE — 99291 PR CRITICAL CARE, E/M 30-74 MINUTES: ICD-10-PCS | Mod: GC,CR,, | Performed by: PSYCHIATRY & NEUROLOGY

## 2020-01-01 PROCEDURE — 85007 BL SMEAR W/DIFF WBC COUNT: CPT

## 2020-01-01 PROCEDURE — 81000 URINALYSIS NONAUTO W/SCOPE: CPT | Mod: 59

## 2020-01-01 PROCEDURE — 87070 CULTURE OTHR SPECIMN AEROBIC: CPT

## 2020-01-01 PROCEDURE — 99223 1ST HOSP IP/OBS HIGH 75: CPT | Mod: ,,, | Performed by: NURSE PRACTITIONER

## 2020-01-01 PROCEDURE — 99223 1ST HOSP IP/OBS HIGH 75: CPT | Mod: GC,,, | Performed by: PSYCHIATRY & NEUROLOGY

## 2020-01-01 PROCEDURE — 87205 SMEAR GRAM STAIN: CPT

## 2020-01-01 PROCEDURE — 99292 PR CRITICAL CARE, ADDL 30 MIN: ICD-10-PCS | Mod: GC,CR,, | Performed by: PSYCHIATRY & NEUROLOGY

## 2020-01-01 PROCEDURE — 83605 ASSAY OF LACTIC ACID: CPT

## 2020-01-01 PROCEDURE — 99223 PR INITIAL HOSPITAL CARE,LEVL III: ICD-10-PCS | Mod: ,,, | Performed by: NURSE PRACTITIONER

## 2020-01-01 PROCEDURE — 84100 ASSAY OF PHOSPHORUS: CPT | Mod: 91

## 2020-01-01 PROCEDURE — 99233 PR SUBSEQUENT HOSPITAL CARE,LEVL III: ICD-10-PCS | Mod: GC,,, | Performed by: INTERNAL MEDICINE

## 2020-01-01 PROCEDURE — 80061 LIPID PANEL: CPT

## 2020-01-01 PROCEDURE — 87186 SC STD MICRODIL/AGAR DIL: CPT

## 2020-01-01 PROCEDURE — 36430 TRANSFUSION BLD/BLD COMPNT: CPT

## 2020-01-01 PROCEDURE — 99232 SBSQ HOSP IP/OBS MODERATE 35: CPT | Mod: GC,,, | Performed by: INTERNAL MEDICINE

## 2020-01-01 PROCEDURE — 83690 ASSAY OF LIPASE: CPT

## 2020-01-01 PROCEDURE — 63600175 PHARM REV CODE 636 W HCPCS: Performed by: INTERNAL MEDICINE

## 2020-01-01 PROCEDURE — 85027 COMPLETE CBC AUTOMATED: CPT

## 2020-01-01 PROCEDURE — 85384 FIBRINOGEN ACTIVITY: CPT

## 2020-01-01 PROCEDURE — 82140 ASSAY OF AMMONIA: CPT

## 2020-01-01 PROCEDURE — 97802 MEDICAL NUTRITION INDIV IN: CPT

## 2020-01-01 PROCEDURE — 99233 SBSQ HOSP IP/OBS HIGH 50: CPT | Mod: GC,,, | Performed by: INTERNAL MEDICINE

## 2020-01-01 PROCEDURE — 83735 ASSAY OF MAGNESIUM: CPT | Mod: 91

## 2020-01-01 PROCEDURE — 99233 PR SUBSEQUENT HOSPITAL CARE,LEVL III: ICD-10-PCS | Mod: GC,,, | Performed by: PSYCHIATRY & NEUROLOGY

## 2020-01-01 PROCEDURE — 99223 PR INITIAL HOSPITAL CARE,LEVL III: ICD-10-PCS | Mod: ,,, | Performed by: INTERNAL MEDICINE

## 2020-01-01 RX ORDER — SODIUM BICARBONATE 1 MEQ/ML
100 SYRINGE (ML) INTRAVENOUS ONCE
Status: COMPLETED | OUTPATIENT
Start: 2020-01-01 | End: 2020-01-01

## 2020-01-01 RX ORDER — MORPHINE SULFATE 1 MG/ML
2 INJECTION, SOLUTION INTRAVENOUS CONTINUOUS
Status: DISCONTINUED | OUTPATIENT
Start: 2020-01-01 | End: 2020-01-01 | Stop reason: HOSPADM

## 2020-01-01 RX ORDER — DEXTROSE MONOHYDRATE 100 MG/ML
INJECTION, SOLUTION INTRAVENOUS CONTINUOUS PRN
Status: DISCONTINUED | OUTPATIENT
Start: 2020-01-01 | End: 2020-01-01

## 2020-01-01 RX ORDER — ACETAMINOPHEN 325 MG/1
650 TABLET ORAL EVERY 6 HOURS PRN
Status: DISCONTINUED | OUTPATIENT
Start: 2020-01-01 | End: 2020-01-01 | Stop reason: HOSPADM

## 2020-01-01 RX ORDER — LORAZEPAM 2 MG/ML
1 INJECTION INTRAMUSCULAR EVERY 30 MIN PRN
Status: DISCONTINUED | OUTPATIENT
Start: 2020-01-01 | End: 2020-01-01 | Stop reason: HOSPADM

## 2020-01-01 RX ORDER — SODIUM CHLORIDE 0.9 % (FLUSH) 0.9 %
10 SYRINGE (ML) INJECTION
Status: DISCONTINUED | OUTPATIENT
Start: 2020-01-01 | End: 2020-01-01

## 2020-01-01 RX ORDER — PROPOFOL 10 MG/ML
5 INJECTION, EMULSION INTRAVENOUS CONTINUOUS
Status: DISCONTINUED | OUTPATIENT
Start: 2020-01-01 | End: 2020-01-01

## 2020-01-01 RX ORDER — SODIUM CHLORIDE 9 MG/ML
INJECTION, SOLUTION INTRAVENOUS CONTINUOUS
Status: DISCONTINUED | OUTPATIENT
Start: 2020-01-01 | End: 2020-01-01

## 2020-01-01 RX ORDER — INSULIN ASPART 100 [IU]/ML
5 INJECTION, SOLUTION INTRAVENOUS; SUBCUTANEOUS
Status: DISCONTINUED | OUTPATIENT
Start: 2020-01-01 | End: 2020-01-01

## 2020-01-01 RX ORDER — GLUCAGON 1 MG
1 KIT INJECTION
Status: DISCONTINUED | OUTPATIENT
Start: 2020-01-01 | End: 2020-01-01

## 2020-01-01 RX ORDER — HYDROCODONE BITARTRATE AND ACETAMINOPHEN 500; 5 MG/1; MG/1
TABLET ORAL
Status: DISCONTINUED | OUTPATIENT
Start: 2020-01-01 | End: 2020-01-01

## 2020-01-01 RX ORDER — FUROSEMIDE 10 MG/ML
60 INJECTION INTRAMUSCULAR; INTRAVENOUS ONCE
Status: COMPLETED | OUTPATIENT
Start: 2020-01-01 | End: 2020-01-01

## 2020-01-01 RX ORDER — INSULIN ASPART 100 [IU]/ML
1-10 INJECTION, SOLUTION INTRAVENOUS; SUBCUTANEOUS EVERY 4 HOURS PRN
Status: DISCONTINUED | OUTPATIENT
Start: 2020-01-01 | End: 2020-01-01

## 2020-01-01 RX ORDER — ONDANSETRON 2 MG/ML
4 INJECTION INTRAMUSCULAR; INTRAVENOUS EVERY 8 HOURS PRN
Status: DISCONTINUED | OUTPATIENT
Start: 2020-01-01 | End: 2020-01-01

## 2020-01-01 RX ORDER — INSULIN ASPART 100 [IU]/ML
2 INJECTION, SOLUTION INTRAVENOUS; SUBCUTANEOUS
Status: DISCONTINUED | OUTPATIENT
Start: 2020-01-01 | End: 2020-01-01

## 2020-01-01 RX ORDER — ATROPINE SULFATE 10 MG/ML
2 SOLUTION/ DROPS OPHTHALMIC EVERY 4 HOURS PRN
Status: DISCONTINUED | OUTPATIENT
Start: 2020-01-01 | End: 2020-01-01 | Stop reason: HOSPADM

## 2020-01-01 RX ORDER — LACTULOSE 10 G/15ML
15 SOLUTION ORAL 3 TIMES DAILY
Status: DISCONTINUED | OUTPATIENT
Start: 2020-01-01 | End: 2020-01-01

## 2020-01-01 RX ORDER — CIPROFLOXACIN 500 MG/1
500 TABLET ORAL EVERY 12 HOURS
Status: DISCONTINUED | OUTPATIENT
Start: 2020-01-01 | End: 2020-01-01

## 2020-01-01 RX ORDER — FENTANYL CITRATE 50 UG/ML
50 INJECTION, SOLUTION INTRAMUSCULAR; INTRAVENOUS ONCE
Status: COMPLETED | OUTPATIENT
Start: 2020-01-01 | End: 2020-01-01

## 2020-01-01 RX ORDER — POTASSIUM CHLORIDE 14.9 MG/ML
20 INJECTION INTRAVENOUS ONCE
Status: COMPLETED | OUTPATIENT
Start: 2020-01-01 | End: 2020-01-01

## 2020-01-01 RX ORDER — FAMOTIDINE 20 MG/1
20 TABLET, FILM COATED ORAL DAILY
Status: DISCONTINUED | OUTPATIENT
Start: 2020-01-01 | End: 2020-01-01

## 2020-01-01 RX ORDER — HYDROXYZINE PAMOATE 25 MG/1
25 CAPSULE ORAL EVERY 8 HOURS PRN
Qty: 10 CAPSULE | Refills: 0 | Status: SHIPPED | OUTPATIENT
Start: 2020-01-01

## 2020-01-01 RX ORDER — ARGATROBAN 1 MG/ML
0.5 INJECTION INTRAVENOUS CONTINUOUS
Status: DISCONTINUED | OUTPATIENT
Start: 2020-01-01 | End: 2020-01-01

## 2020-01-01 RX ORDER — NOREPINEPHRINE BITARTRATE/D5W 4MG/250ML
0.02 PLASTIC BAG, INJECTION (ML) INTRAVENOUS CONTINUOUS
Status: DISCONTINUED | OUTPATIENT
Start: 2020-01-01 | End: 2020-01-01

## 2020-01-01 RX ORDER — IPRATROPIUM BROMIDE AND ALBUTEROL SULFATE 2.5; .5 MG/3ML; MG/3ML
3 SOLUTION RESPIRATORY (INHALATION) EVERY 6 HOURS
Status: DISCONTINUED | OUTPATIENT
Start: 2020-01-01 | End: 2020-01-01

## 2020-01-01 RX ORDER — POTASSIUM CHLORIDE 1.5 G/1.58G
40 POWDER, FOR SOLUTION ORAL ONCE
Status: COMPLETED | OUTPATIENT
Start: 2020-01-01 | End: 2020-01-01

## 2020-01-01 RX ORDER — POTASSIUM CHLORIDE 1.5 G/1.58G
60 POWDER, FOR SOLUTION ORAL ONCE
Status: COMPLETED | OUTPATIENT
Start: 2020-01-01 | End: 2020-01-01

## 2020-01-01 RX ORDER — INSULIN ASPART 100 [IU]/ML
4 INJECTION, SOLUTION INTRAVENOUS; SUBCUTANEOUS
Status: DISCONTINUED | OUTPATIENT
Start: 2020-01-01 | End: 2020-01-01

## 2020-01-01 RX ORDER — FENTANYL CITRATE 50 UG/ML
12.5 INJECTION, SOLUTION INTRAMUSCULAR; INTRAVENOUS
Status: DISCONTINUED | OUTPATIENT
Start: 2020-01-01 | End: 2020-01-01

## 2020-01-01 RX ORDER — VANCOMYCIN HCL IN 5 % DEXTROSE 1G/250ML
1000 PLASTIC BAG, INJECTION (ML) INTRAVENOUS ONCE
Status: COMPLETED | OUTPATIENT
Start: 2020-01-01 | End: 2020-01-01

## 2020-01-01 RX ORDER — ATORVASTATIN CALCIUM 20 MG/1
40 TABLET, FILM COATED ORAL DAILY
Status: DISCONTINUED | OUTPATIENT
Start: 2020-01-01 | End: 2020-01-01

## 2020-01-01 RX ORDER — CIPROFLOXACIN 500 MG/1
500 TABLET ORAL EVERY 24 HOURS
Status: DISCONTINUED | OUTPATIENT
Start: 2020-01-01 | End: 2020-01-01

## 2020-01-01 RX ADMIN — FENTANYL CITRATE 12.5 MCG: 50 INJECTION INTRAMUSCULAR; INTRAVENOUS at 12:08

## 2020-01-01 RX ADMIN — CIPROFLOXACIN HYDROCHLORIDE 500 MG: 500 TABLET, FILM COATED ORAL at 08:08

## 2020-01-01 RX ADMIN — VANCOMYCIN HYDROCHLORIDE 1000 MG: 1 INJECTION, POWDER, LYOPHILIZED, FOR SOLUTION INTRAVENOUS at 10:08

## 2020-01-01 RX ADMIN — ATORVASTATIN CALCIUM 40 MG: 20 TABLET, FILM COATED ORAL at 08:08

## 2020-01-01 RX ADMIN — LACTULOSE 15 G: 20 SOLUTION ORAL at 08:08

## 2020-01-01 RX ADMIN — SODIUM CHLORIDE: 0.9 INJECTION, SOLUTION INTRAVENOUS at 01:08

## 2020-01-01 RX ADMIN — INSULIN ASPART 4 UNITS: 100 INJECTION, SOLUTION INTRAVENOUS; SUBCUTANEOUS at 08:08

## 2020-01-01 RX ADMIN — OXACILLIN SODIUM 12 G: 10 INJECTION, POWDER, FOR SOLUTION INTRAVENOUS at 03:08

## 2020-01-01 RX ADMIN — IPRATROPIUM BROMIDE AND ALBUTEROL SULFATE 3 ML: .5; 2.5 SOLUTION RESPIRATORY (INHALATION) at 07:08

## 2020-01-01 RX ADMIN — ARGATROBAN 0.62 MCG/KG/MIN: 50 INJECTION, SOLUTION INTRAVENOUS at 11:08

## 2020-01-01 RX ADMIN — ARGATROBAN 0.35 MCG/KG/MIN: 50 INJECTION, SOLUTION INTRAVENOUS at 04:08

## 2020-01-01 RX ADMIN — INSULIN ASPART 2 UNITS: 100 INJECTION, SOLUTION INTRAVENOUS; SUBCUTANEOUS at 11:08

## 2020-01-01 RX ADMIN — FENTANYL CITRATE 12.5 MCG: 50 INJECTION INTRAMUSCULAR; INTRAVENOUS at 08:08

## 2020-01-01 RX ADMIN — IPRATROPIUM BROMIDE AND ALBUTEROL SULFATE 3 ML: .5; 2.5 SOLUTION RESPIRATORY (INHALATION) at 08:08

## 2020-01-01 RX ADMIN — INSULIN ASPART 4 UNITS: 100 INJECTION, SOLUTION INTRAVENOUS; SUBCUTANEOUS at 04:08

## 2020-01-01 RX ADMIN — IPRATROPIUM BROMIDE AND ALBUTEROL SULFATE 3 ML: .5; 2.5 SOLUTION RESPIRATORY (INHALATION) at 12:08

## 2020-01-01 RX ADMIN — SODIUM BICARBONATE 100 MEQ: 84 INJECTION INTRAVENOUS at 05:08

## 2020-01-01 RX ADMIN — POTASSIUM CHLORIDE 60 MEQ: 1.5 POWDER, FOR SOLUTION ORAL at 03:08

## 2020-01-01 RX ADMIN — INSULIN DETEMIR 10 UNITS: 100 INJECTION, SOLUTION SUBCUTANEOUS at 08:08

## 2020-01-01 RX ADMIN — PROPOFOL 15 MCG/KG/MIN: 10 INJECTION, EMULSION INTRAVENOUS at 10:08

## 2020-01-01 RX ADMIN — HYDROCORTISONE SODIUM SUCCINATE 50 MG: 100 INJECTION, POWDER, FOR SOLUTION INTRAMUSCULAR; INTRAVENOUS at 06:08

## 2020-01-01 RX ADMIN — INSULIN ASPART 4 UNITS: 100 INJECTION, SOLUTION INTRAVENOUS; SUBCUTANEOUS at 12:08

## 2020-01-01 RX ADMIN — INSULIN ASPART 2 UNITS: 100 INJECTION, SOLUTION INTRAVENOUS; SUBCUTANEOUS at 03:08

## 2020-01-01 RX ADMIN — IPRATROPIUM BROMIDE AND ALBUTEROL SULFATE 3 ML: .5; 2.5 SOLUTION RESPIRATORY (INHALATION) at 01:08

## 2020-01-01 RX ADMIN — INSULIN ASPART 6 UNITS: 100 INJECTION, SOLUTION INTRAVENOUS; SUBCUTANEOUS at 06:08

## 2020-01-01 RX ADMIN — INSULIN ASPART 2 UNITS: 100 INJECTION, SOLUTION INTRAVENOUS; SUBCUTANEOUS at 08:08

## 2020-01-01 RX ADMIN — ARGATROBAN 0.35 MCG/KG/MIN: 50 INJECTION, SOLUTION INTRAVENOUS at 08:08

## 2020-01-01 RX ADMIN — HYDROCORTISONE SODIUM SUCCINATE 50 MG: 100 INJECTION, POWDER, FOR SOLUTION INTRAMUSCULAR; INTRAVENOUS at 09:08

## 2020-01-01 RX ADMIN — ARGATROBAN 0.35 MCG/KG/MIN: 50 INJECTION, SOLUTION INTRAVENOUS at 02:08

## 2020-01-01 RX ADMIN — INSULIN ASPART 2 UNITS: 100 INJECTION, SOLUTION INTRAVENOUS; SUBCUTANEOUS at 04:08

## 2020-01-01 RX ADMIN — LACTULOSE 15 G: 20 SOLUTION ORAL at 03:08

## 2020-01-01 RX ADMIN — PROPOFOL 5 MCG/KG/MIN: 10 INJECTION, EMULSION INTRAVENOUS at 01:08

## 2020-01-01 RX ADMIN — IPRATROPIUM BROMIDE AND ALBUTEROL SULFATE 3 ML: .5; 2.5 SOLUTION RESPIRATORY (INHALATION) at 02:08

## 2020-01-01 RX ADMIN — FUROSEMIDE 60 MG: 10 INJECTION, SOLUTION INTRAMUSCULAR; INTRAVENOUS at 11:08

## 2020-01-01 RX ADMIN — INSULIN ASPART 1 UNITS: 100 INJECTION, SOLUTION INTRAVENOUS; SUBCUTANEOUS at 12:08

## 2020-01-01 RX ADMIN — INSULIN ASPART 2 UNITS: 100 INJECTION, SOLUTION INTRAVENOUS; SUBCUTANEOUS at 12:08

## 2020-01-01 RX ADMIN — Medication 0.06 MCG/KG/MIN: at 10:08

## 2020-01-01 RX ADMIN — FAMOTIDINE 20 MG: 20 TABLET ORAL at 08:08

## 2020-01-01 RX ADMIN — OXACILLIN SODIUM 12 G: 10 INJECTION, POWDER, FOR SOLUTION INTRAVENOUS at 02:08

## 2020-01-01 RX ADMIN — SODIUM CHLORIDE 1.5 UNITS/HR: 9 INJECTION, SOLUTION INTRAVENOUS at 02:08

## 2020-01-01 RX ADMIN — LACTULOSE 15 G: 20 SOLUTION ORAL at 09:08

## 2020-01-01 RX ADMIN — IOHEXOL 15 ML: 350 INJECTION, SOLUTION INTRAVENOUS at 03:08

## 2020-01-01 RX ADMIN — HYDROCORTISONE SODIUM SUCCINATE 50 MG: 100 INJECTION, POWDER, FOR SOLUTION INTRAMUSCULAR; INTRAVENOUS at 03:08

## 2020-01-01 RX ADMIN — INSULIN ASPART 1 UNITS: 100 INJECTION, SOLUTION INTRAVENOUS; SUBCUTANEOUS at 05:08

## 2020-01-01 RX ADMIN — LORAZEPAM 1 MG: 2 INJECTION INTRAMUSCULAR; INTRAVENOUS at 05:08

## 2020-01-01 RX ADMIN — Medication 14 MG/HR: at 12:08

## 2020-01-01 RX ADMIN — PIPERACILLIN SODIUM AND TAZOBACTAM SODIUM 4.5 G: 4; .5 INJECTION, POWDER, LYOPHILIZED, FOR SOLUTION INTRAVENOUS at 02:08

## 2020-01-01 RX ADMIN — ATORVASTATIN CALCIUM 40 MG: 20 TABLET, FILM COATED ORAL at 09:08

## 2020-01-01 RX ADMIN — INSULIN ASPART 6 UNITS: 100 INJECTION, SOLUTION INTRAVENOUS; SUBCUTANEOUS at 01:08

## 2020-01-01 RX ADMIN — ARGATROBAN 0.47 MCG/KG/MIN: 50 INJECTION, SOLUTION INTRAVENOUS at 05:08

## 2020-01-01 RX ADMIN — INSULIN ASPART 4 UNITS: 100 INJECTION, SOLUTION INTRAVENOUS; SUBCUTANEOUS at 11:08

## 2020-01-01 RX ADMIN — PROPOFOL 15 MCG/KG/MIN: 10 INJECTION, EMULSION INTRAVENOUS at 11:08

## 2020-01-01 RX ADMIN — INSULIN ASPART 2 UNITS: 100 INJECTION, SOLUTION INTRAVENOUS; SUBCUTANEOUS at 09:08

## 2020-01-01 RX ADMIN — CEFTRIAXONE 2 G: 2 INJECTION, SOLUTION INTRAVENOUS at 11:08

## 2020-01-01 RX ADMIN — FAMOTIDINE 20 MG: 20 TABLET ORAL at 09:08

## 2020-01-01 RX ADMIN — FENTANYL CITRATE 12.5 MCG: 50 INJECTION INTRAMUSCULAR; INTRAVENOUS at 04:08

## 2020-01-01 RX ADMIN — INSULIN ASPART 6 UNITS: 100 INJECTION, SOLUTION INTRAVENOUS; SUBCUTANEOUS at 09:08

## 2020-01-01 RX ADMIN — HYDROCORTISONE SODIUM SUCCINATE 50 MG: 100 INJECTION, POWDER, FOR SOLUTION INTRAMUSCULAR; INTRAVENOUS at 08:08

## 2020-01-01 RX ADMIN — LACTULOSE 15 G: 20 SOLUTION ORAL at 05:08

## 2020-01-01 RX ADMIN — INSULIN DETEMIR 6 UNITS: 100 INJECTION, SOLUTION SUBCUTANEOUS at 08:08

## 2020-01-01 RX ADMIN — PROPOFOL 15 MCG/KG/MIN: 10 INJECTION, EMULSION INTRAVENOUS at 08:08

## 2020-01-01 RX ADMIN — CIPROFLOXACIN HYDROCHLORIDE 500 MG: 500 TABLET, FILM COATED ORAL at 09:08

## 2020-01-01 RX ADMIN — ARGATROBAN 0.35 MCG/KG/MIN: 50 INJECTION, SOLUTION INTRAVENOUS at 12:08

## 2020-01-01 RX ADMIN — POTASSIUM CHLORIDE 20 MEQ: 14.9 INJECTION, SOLUTION INTRAVENOUS at 06:08

## 2020-01-01 RX ADMIN — FENTANYL CITRATE 12.5 MCG: 50 INJECTION INTRAMUSCULAR; INTRAVENOUS at 07:08

## 2020-01-01 RX ADMIN — FENTANYL CITRATE 50 MCG: 50 INJECTION INTRAMUSCULAR; INTRAVENOUS at 12:08

## 2020-01-01 RX ADMIN — POTASSIUM CHLORIDE 40 MEQ: 1.5 POWDER, FOR SOLUTION ORAL at 11:08

## 2020-01-01 RX ADMIN — LACTULOSE 15 G: 20 SOLUTION ORAL at 02:08

## 2020-01-01 RX ADMIN — Medication 2 MG/HR: at 04:08

## 2020-01-01 RX ADMIN — ARGATROBAN 0.5 MCG/KG/MIN: 50 INJECTION, SOLUTION INTRAVENOUS at 02:08

## 2020-01-01 RX ADMIN — INSULIN ASPART 4 UNITS: 100 INJECTION, SOLUTION INTRAVENOUS; SUBCUTANEOUS at 01:08

## 2020-01-01 RX ADMIN — PROPOFOL 15 MCG/KG/MIN: 10 INJECTION, EMULSION INTRAVENOUS at 02:08

## 2020-01-01 RX ADMIN — IOHEXOL 15 ML: 350 INJECTION, SOLUTION INTRAVENOUS at 04:08

## 2020-01-01 RX ADMIN — HYDROCORTISONE SODIUM SUCCINATE 50 MG: 100 INJECTION, POWDER, FOR SOLUTION INTRAMUSCULAR; INTRAVENOUS at 01:08

## 2020-01-01 RX ADMIN — INSULIN DETEMIR 10 UNITS: 100 INJECTION, SOLUTION SUBCUTANEOUS at 12:08

## 2020-01-01 RX ADMIN — PROPOFOL 15 MCG/KG/MIN: 10 INJECTION, EMULSION INTRAVENOUS at 07:08

## 2020-07-15 NOTE — ED PROVIDER NOTES
Encounter Date: 7/15/2020       History     Chief Complaint   Patient presents with    Shaking     Patient is 69-year-old black male who woke up this morning and had some shaking and chills.  Low-grade temperature reported.  Denies nausea, vomiting, diarrhea.  Denies nasal congestion or cough.  No sick contacts at home.  He does use alcohol occasionally he says.        Review of patient's allergies indicates:  No Known Allergies  Past Medical History:   Diagnosis Date    Hypertension      Past Surgical History:   Procedure Laterality Date    CATARACT EXTRACTION W/  INTRAOCULAR LENS IMPLANT Right 12/12/2019    Procedure: EXTRACTION, CATARACT, WITH IOL INSERTION;  Surgeon: Alcides Holcomb MD;  Location: [facility];  Service: Ophthalmology;  Laterality: Right;    PHACOEMULSIFICATION OF CATARACT Right 12/12/2019    Procedure: PHACOEMULSIFICATION, CATARACT;  Surgeon: Alcides Holcomb MD;  Location: [facility];  Service: Ophthalmology;  Laterality: Right;     Family History   Problem Relation Age of Onset    Heart attack Father      Social History     Tobacco Use    Smoking status: Never Smoker    Smokeless tobacco: Never Used   Substance Use Topics    Alcohol use: Yes     Comment: social    Drug use: Never     Review of Systems   Constitutional: Positive for chills. Negative for fever.   HENT: Negative for sore throat.    Respiratory: Negative for shortness of breath.    Cardiovascular: Negative for chest pain.   Gastrointestinal: Negative for nausea.   Genitourinary: Negative for dysuria.   Musculoskeletal: Negative for back pain.   Skin: Negative for rash.   Neurological: Positive for tremors. Negative for weakness.   Hematological: Does not bruise/bleed easily.       Physical Exam     Initial Vitals   BP Pulse Resp Temp SpO2   07/15/20 1244 07/15/20 1244 07/15/20 1244 07/15/20 1246 07/15/20 1244   (!) 179/101 82 18 100.1 °F (37.8 °C) 97 %      MAP       --                Physical Exam    Nursing note and vitals  reviewed.  Constitutional: He appears well-developed and well-nourished.   HENT:   Head: Normocephalic and atraumatic.   Eyes: EOM are normal. Pupils are equal, round, and reactive to light.   Neck: Normal range of motion. Neck supple.   Cardiovascular: Normal rate.   Pulmonary/Chest: Breath sounds normal. No respiratory distress. He has no wheezes.   Abdominal: Soft. He exhibits no distension. There is no abdominal tenderness.   Musculoskeletal: Normal range of motion.   Neurological: He is alert and oriented to person, place, and time.   Skin: Skin is warm and dry.   Psychiatric: He has a normal mood and affect. Thought content normal.         ED Course   Procedures  Labs Reviewed   POCT GLUCOSE          Imaging Results    None                                          Clinical Impression:       ICD-10-CM ICD-9-CM   1. Shaking  R25.1 781.0         Disposition:   Disposition: Discharged  Condition: Stable                        Humberto Russo Jr., MD  07/15/20 6371

## 2020-07-19 PROBLEM — I10 ESSENTIAL HYPERTENSION: Status: ACTIVE | Noted: 2020-01-01

## 2020-07-19 PROBLEM — K76.6 PORTAL HYPERTENSION: Status: ACTIVE | Noted: 2020-01-01

## 2020-07-19 PROBLEM — U07.1 COVID-19 VIRUS INFECTION: Status: ACTIVE | Noted: 2020-01-01

## 2020-07-19 PROBLEM — N17.9 AKI (ACUTE KIDNEY INJURY): Status: ACTIVE | Noted: 2020-01-01

## 2020-07-19 PROBLEM — F10.930 ALCOHOL WITHDRAWAL SYNDROME WITHOUT COMPLICATION: Status: ACTIVE | Noted: 2020-01-01

## 2020-07-30 PROBLEM — E63.8 INADEQUATE DIETARY INTAKE OF PROTEIN: Status: ACTIVE | Noted: 2020-01-01

## 2020-07-31 PROBLEM — R41.82 ALTERED MENTAL STATUS: Status: ACTIVE | Noted: 2020-01-01

## 2020-08-05 PROBLEM — D69.6 THROMBOCYTOPENIA: Status: ACTIVE | Noted: 2020-01-01

## 2020-08-05 PROBLEM — D68.59 HYPERCOAGULOPATHY: Status: ACTIVE | Noted: 2020-01-01

## 2020-08-05 PROBLEM — R78.81 MSSA BACTEREMIA: Status: ACTIVE | Noted: 2020-01-01

## 2020-08-05 PROBLEM — J18.9 PNEUMONIA: Status: ACTIVE | Noted: 2020-01-01

## 2020-08-05 PROBLEM — A41.9 SEVERE SEPSIS: Status: ACTIVE | Noted: 2020-01-01

## 2020-08-05 PROBLEM — G40.901 NON-CONVULSIVE STATUS EPILEPTICUS: Status: ACTIVE | Noted: 2020-01-01

## 2020-08-05 PROBLEM — E86.1 HYPOTENSION DUE TO HYPOVOLEMIA: Status: ACTIVE | Noted: 2020-01-01

## 2020-08-05 PROBLEM — I63.81 LACUNAR STROKE: Status: ACTIVE | Noted: 2020-01-01

## 2020-08-05 PROBLEM — I21.3 ST ELEVATION MYOCARDIAL INFARCTION (STEMI): Status: ACTIVE | Noted: 2020-01-01

## 2020-08-05 PROBLEM — J96.01 ACUTE RESPIRATORY FAILURE WITH HYPOXIA: Status: ACTIVE | Noted: 2020-01-01

## 2020-08-05 PROBLEM — R73.9 HYPERGLYCEMIA: Status: ACTIVE | Noted: 2020-01-01

## 2020-08-05 PROBLEM — I81 PORTAL VEIN THROMBOSIS: Status: ACTIVE | Noted: 2020-01-01

## 2020-08-05 PROBLEM — G93.40 ACUTE ENCEPHALOPATHY: Status: ACTIVE | Noted: 2020-01-01

## 2020-08-05 PROBLEM — D75.829 HIT (HEPARIN-INDUCED THROMBOCYTOPENIA): Status: ACTIVE | Noted: 2020-01-01

## 2020-08-05 PROBLEM — B95.61 MSSA BACTEREMIA: Status: ACTIVE | Noted: 2020-01-01

## 2020-08-05 PROBLEM — R65.20 SEVERE SEPSIS: Status: ACTIVE | Noted: 2020-01-01

## 2020-08-05 NOTE — CONSULTS
Inpatient consult to Physical Medicine Rehab  Consult performed by: Aleena Benitez NP  Consult ordered by: Danny Alvarado NP  Reason for consult: assess rehab needs      Patient is COVID positive and intubated.  Will perform chart review with PM&R MD for PAC recommendation when patient is medically stable. Patient must be COVID negative upon discharge to be considered for rehab.    ESTELA Elliott, FNP-C  Physical Medicine & Rehabilitation   08/05/2020

## 2020-08-05 NOTE — ASSESSMENT & PLAN NOTE
CRT 4.3, GFR 15.4  K < 5, no metabolic acidosis noted on ABG  Fio2 needs Are minimal, given COVID19   Follow I/O, making urine  Consider nephrology consult if acute changes

## 2020-08-05 NOTE — SUBJECTIVE & OBJECTIVE
Past Medical History:   Diagnosis Date    Hypertension        Past Surgical History:   Procedure Laterality Date    CATARACT EXTRACTION W/  INTRAOCULAR LENS IMPLANT Right 12/12/2019    Procedure: EXTRACTION, CATARACT, WITH IOL INSERTION;  Surgeon: Alcides Holcomb MD;  Location: [facility];  Service: Ophthalmology;  Laterality: Right;    PHACOEMULSIFICATION OF CATARACT Right 12/12/2019    Procedure: PHACOEMULSIFICATION, CATARACT;  Surgeon: Alcides Holcomb MD;  Location: [facility];  Service: Ophthalmology;  Laterality: Right;       Review of patient's allergies indicates:  No Known Allergies  Current Facility-Administered Medications   Medication Frequency    0.9%  NaCl infusion Continuous    acetaminophen tablet 650 mg Q6H PRN    albuterol-ipratropium 2.5 mg-0.5 mg/3 mL nebulizer solution 3 mL Q6H    argatroban in sodium chloride 0.9% (conc: 1 mg/mL) Continuous    atorvastatin tablet 40 mg Daily    [START ON 8/6/2020] ciprofloxacin HCl tablet 500 mg Q24H    dextrose 10 % infusion Continuous PRN    dextrose 50% injection 12.5 g PRN    famotidine tablet 20 mg Daily    glucagon (human recombinant) injection 1 mg PRN    hydrocortisone sodium succinate injection 50 mg Q8H    insulin aspart U-100 pen 1-10 Units Q4H PRN    iohexoL (OMNIPAQUE 350) injection 15 mL PRN    lactulose 20 gram/30 mL solution Soln 15 g TID    norepinephrine 4 mg in dextrose 5% 250 mL infusion (premix) (titrating) Continuous    ondansetron injection 4 mg Q8H PRN    oxacillin 12 g in  mL CONTINUOUS INFUSION Q24H    potassium chloride packet 60 mEq Once    propofol (DIPRIVAN) 10 mg/mL infusion (NON-TITRATING) Continuous    sodium chloride 0.9% flush 10 mL PRN     Family History     Problem Relation (Age of Onset)    Heart attack Father        Tobacco Use    Smoking status: Never Smoker    Smokeless tobacco: Never Used   Substance and Sexual Activity    Alcohol use: Yes     Comment: social    Drug use: Never    Sexual  activity: Not on file     Review of Systems   Unable to perform ROS: Intubated     Objective:     Vital Signs (Most Recent):  Temp: 96.2 °F (35.7 °C)(jessica hugger applied) (08/05/20 1400)  Pulse: 82 (08/05/20 1500)  Resp: 19 (08/05/20 1433)  BP: 123/64 (08/05/20 1500)  SpO2: 99 % (08/05/20 1433)  O2 Device (Oxygen Therapy): ventilator (08/05/20 1433) Vital Signs (24h Range):  Temp:  [96.2 °F (35.7 °C)-99.4 °F (37.4 °C)] 96.2 °F (35.7 °C)  Pulse:  [] 82  Resp:  [17-34] 19  SpO2:  [85 %-100 %] 99 %  BP: ()/(56-69) 123/64  Arterial Line BP: (107-167)/(49-75) 133/55     Weight: 76.5 kg (168 lb 10.4 oz) (08/05/20 0800)  Body mass index is 22.87 kg/m².  Body surface area is 1.97 meters squared.    I/O last 3 completed shifts:  In: 694.6 [I.V.:594.6; IV Piggyback:100]  Out: 350 [Urine:350]    Physical Exam  Vitals signs and nursing note reviewed.   Constitutional:       General: He is not in acute distress.     Appearance: He is ill-appearing.      Interventions: He is sedated and intubated.      Comments: A full-contact physical exam was not possible due to the clinical condition of the patient due to covid 19 infection and the necessity to minimize exposure.     Physical exam of the primary team reviewed.    HENT:      Head: Normocephalic and atraumatic.      Mouth/Throat:      Mouth: Mucous membranes are dry.   Cardiovascular:      Rate and Rhythm: Normal rate.   Pulmonary:      Effort: Pulmonary effort is normal. No respiratory distress. He is intubated.         Significant Labs:  CBC:   Recent Labs   Lab 08/05/20 0533   WBC 13.92*   RBC 3.57*   HGB 11.7*   HCT 34.7*   PLT 64*   MCV 97   MCH 32.8*   MCHC 33.7     CMP:   Recent Labs   Lab 08/05/20  0533 08/05/20  1425   * 293*   CALCIUM 6.9* 7.0*   ALBUMIN 1.4* 1.3*   PROT 5.5*  --    * 137   K 3.5 3.1*   CO2 22* 23    106   BUN 99* 92*   CREATININE 4.0* 3.6*   ALKPHOS 63  --    *  --    *  --    BILITOT 0.8  --      All labs  within the past 24 hours have been reviewed.

## 2020-08-05 NOTE — ASSESSMENT & PLAN NOTE
Lacunar strokes noted on CTH  8/2  Vascular Neurology consulted  Q1hr Neuro checks, SBP < 180  MRI head, MRA neck/head pending  On argatroban, hypercoag d/t covid19

## 2020-08-05 NOTE — PROGRESS NOTES
Pharmacokinetic Follow-Up Assessment: IV Vancomycin    Assessment/Plan:    - Vanc AM random is 15.1 mcg/mL, drawn approximately 20 hrs post-dose  - Continue dosing by level in the setting of HINA  - Administer vanc 1000 mg x 1  - Draw random level tomorrow 8/6 with AM labs    Pharmacy will continue to follow and monitor vancomycin. Please contact pharmacy at extension [phone] with any questions regarding this assessment.     Thank you for the consult,   Kirstie Loera, PharmD, [facility]  Neurocritical Care Pharmacist  [id number]       Patient brief summary:  Atul Mendoza is a 69 y.o. male initiated on antimicrobial therapy with IV Vancomycin for treatment of suspected bacteremia    Drug Allergies:   Review of patient's allergies indicates:  No Known Allergies    Actual Body Weight:   76.5 kg    Renal Function:   Estimated Creatinine Clearance: 19.4 mL/min (A) (based on SCr of 4 mg/dL (H)).,     Dialysis Method (if applicable):  N/A    CBC (last 72 hours):  Recent Labs   Lab Result Units 08/02/20  1428 08/02/20  2036 08/03/20  0617 08/03/20  2304 08/04/20  0330 08/04/20  2236 08/05/20  0005 08/05/20  0533   WBC K/uL 22.73* 18.47* 15.28* 15.67* 18.44* 13.23*  --  13.92*   Hemoglobin g/dL 15.0 12.7* 13.1* 14.3 14.2 11.7*  --  11.7*   Hemoglobin A1C %  --   --   --   --   --   --  6.0*  --    Hematocrit % 46.3 37.7* 38.2* 40.6 40.8 33.9*  --  34.7*   Platelets K/uL 128* 83* 63* 79* 84* 79*  --  64*   Gran% % 68.0 68.0 84.0* 90.5* 89.5* 88.0*  --  91.0*   Lymph% % 10.0* 7.0* 7.0* 3.3* 3.1* 4.0*  --  3.7*   Mono% % 3.0* 2.0* 4.0 5.3 6.3 4.0  --  4.9   Eosinophil% % 0.0 4.0 1.0 0.0 0.0 0.0  --  0.0   Basophil% % 0.0 0.0 0.0 0.1 0.1 0.0  --  0.1   Differential Method  Manual Manual Manual Automated Automated Manual  --  Automated       Metabolic Panel (last 72 hours):  Recent Labs   Lab Result Units 08/02/20  1322 08/02/20  1427 08/02/20  1428 08/02/20  2036 08/03/20  0617 08/04/20  0330 08/04/20  0924 08/04/20  2236  08/05/20  0116 08/05/20  0533   Sodium mmol/L 143  --  142 141 138 138  --  135*  --  134*   Sodium Urine Random mmol/L  --  <20  --   --   --   --   --   --   --   --    Potassium mmol/L 4.0  --  3.8 3.2* 4.1 4.3  --  3.8  --  3.5   Chloride mmol/L 99  --  101 111* 107 103  --  104  --  103   CO2 mmol/L 13*  --  12* 18* 21* 20*  --  22*  --  22*   Glucose mg/dL 340*  --  326* 172* 147* 174*  --  220*  --  263*   Glucose, CSF mg/dL  --   --   --   --   --   --  99*  --   --   --    Glucose, UA   --   --  Negative  --   --   --   --   --  1+*  --    BUN, Bld mg/dL 64*  --  68* 65* 74* 85*  --  97*  --  99*   Creatinine mg/dL 4.8*  --  4.6* 3.4* 3.6* 4.0*  --  4.3*  --  4.0*   Creatinine, Random Ur mg/dL  --  140.2  --   --   --   --   --   --   --   --    Albumin g/dL 1.5*  --  1.4* 1.1* 1.2* 1.3*  --  1.3*  --  1.4*   Total Bilirubin mg/dL 1.5*  --  1.5* 0.9 1.2* 1.1*  --  0.9  --  0.8   Alkaline Phosphatase U/L 47*  --  43* 41* 40* 53*  --  58  --  63   AST U/L 126*  --  153* 229* 207* 206*  --  162*  --  121*   ALT U/L 57*  --  71* 92* 108* 155*  --  134*  --  130*   Magnesium mg/dL  --   --   --   --  2.4 2.6  --  2.3  --  2.5   Phosphorus mg/dL  --   --   --   --  3.9 5.3*  --  4.4  --  4.9*       Drug levels (last 3 results):  Recent Labs   Lab Result Units 08/03/20  0617 08/04/20  0330 08/05/20  0533   Vancomycin, Random ug/mL 25.7 17.5 15.1       Microbiologic Results:  Microbiology Results (last 7 days)     Procedure Component Value Units Date/Time    Culture, Respiratory with Gram Stain [[id number]] Collected: 08/05/20 0124    Order Status: Completed Specimen: Respiratory from Sputum, Induced Updated: 08/05/20 0247     Gram Stain (Respiratory) <10 epithelial cells per low power field.     Gram Stain (Respiratory) Many WBC's     Gram Stain (Respiratory) Moderate Gram negative rods     Gram Stain (Respiratory) Rare Gram positive rods     Gram Stain (Respiratory) Rare Gram positive cocci    Narrative:       Mini-BAL.    Urine culture [[id number]] Collected: 08/05/20 0116    Order Status: No result Specimen: Urine Updated: 08/05/20 0205    Blood culture [[id number]] Collected: 08/05/20 0141    Order Status: Sent Specimen: Blood from Peripheral, Forearm, Right Updated: 08/05/20 0152    Blood culture [[id number]] Collected: 08/05/20 0143    Order Status: Sent Specimen: Blood from Peripheral, Forearm, Left Updated: 08/05/20 0152

## 2020-08-05 NOTE — HPI
Atul Mendoza is a 69 y.o. male with a significant medical history of HTN, ETOH abuse, now COVID-19 + who presents to the hospital as a transfer from [facility] for evaluation of altered mental status.  HPI information gathered from review of the patient's medical record due to the patient being currently intubated and sedated.  For full hospital course at the OSH see the discharge summary.  Briefly, the patient presented to the OSH on 7/19/2020 and was admitted for ETOH withdrawal.  A COVID-19 test was obtained for admission and was positive.  The patient was asymptomatic and admitted to the [facility] Medicine service.  On 7/26 the patient began to desat to the 80s on RA and was placed on 3L NC bringing his O2 sat to the low mid 90s.  On 8/2 the patient became tachycardic, hypoxic, with elevated troponin and EKG changes.  He was upgraded to the ICU.  At that time he was on BiPap and noted to have jerking movements, altered mental status.   He was intubated and started on Levophed and propofol.  A CTH was obtained and revealed a 9 mm hypodensity in the right lentiform nucleus not revealed in the previously obtained MRI (motion limited) which was concerning for new lacunar stroke.  8/3/2020 the patient had thrombocytopenia, concern for HIT.  He was transfused 1u platelets and started on Argatroban.  On 8/4/2020 the patient's sedation was held and he was noted to have minimal movement with noxious stimuli which was concerning for non-convulsive status epilepticus.  The patient was transferred to [facility] for higher level of care.  Currently the patient is intubated and on propofol, levophed, and argatroban.  No spontaneous movement of extremities noted.  On exam the patient withdraws from pain in the BUE.  No movement noted in the lower extremities.  The patient is currently admitted to [facility] on the 16th floor COVID unit.

## 2020-08-05 NOTE — PROCEDURES
EEG prelim    Notified about EEG cap placement.  The study is not running live on the  despite troubleshooting.  The study will need to be manually downloaded and reviewed in its entirety in the morning before a report can be generated.  At that time, the decision will need to be made about whether to proceed with standard scalp electrodes.  Until then, please treat episodes concerning for seizure clinically.    Eileen Smith MD PhD  Neurology-Epilepsy  [facility].  [facility]

## 2020-08-05 NOTE — SUBJECTIVE & OBJECTIVE
Past Medical History:   Diagnosis Date    Hypertension      Past Surgical History:   Procedure Laterality Date    CATARACT EXTRACTION W/  INTRAOCULAR LENS IMPLANT Right 12/12/2019    Procedure: EXTRACTION, CATARACT, WITH IOL INSERTION;  Surgeon: Alcides Holcomb MD;  Location: [facility];  Service: Ophthalmology;  Laterality: Right;    PHACOEMULSIFICATION OF CATARACT Right 12/12/2019    Procedure: PHACOEMULSIFICATION, CATARACT;  Surgeon: Alcides Holcomb MD;  Location: [facility];  Service: Ophthalmology;  Laterality: Right;     Family History   Problem Relation Age of Onset    Heart attack Father      Social History     Tobacco Use    Smoking status: Never Smoker    Smokeless tobacco: Never Used   Substance Use Topics    Alcohol use: Yes     Comment: social    Drug use: Never     Review of patient's allergies indicates:  No Known Allergies    Medications: I have reviewed the current medication administration record.    Medications Prior to Admission   Medication Sig Dispense Refill Last Dose    amlodipine-benazepril 10-20mg (LOTREL) 10-20 mg per capsule Take 1 capsule by mouth once daily.       CIPROFLOXACIN HCL OPHT Apply to eye 4 (four) times daily.       difluprednate (DUREZOL) 0.05 % Drop ophthalmic solution 1 drop.       hydrOXYzine pamoate (VISTARIL) 25 MG Cap Take 1 capsule (25 mg total) by mouth every 8 (eight) hours as needed. 10 capsule 0     nepafenac (ILEVRO) 0.3 % DrpS Apply to eye every evening.          Review of Systems   Unable to perform ROS: Intubated   Constitutional: Positive for fever.   HENT: Negative for drooling.    Eyes: Negative for discharge and redness.   Respiratory: Positive for shortness of breath.    Cardiovascular: Negative for leg swelling.   Gastrointestinal: Negative for diarrhea and vomiting.   Genitourinary: Negative for hematuria.   Musculoskeletal: Negative for neck stiffness.   Skin: Positive for color change. Negative for rash.   Neurological: Positive for tremors,  speech difficulty and weakness.   Psychiatric/Behavioral: Positive for agitation and confusion.     Objective:     Vital Signs (Most Recent):  Temp: 99.4 °F (37.4 °C) (08/05/20 0000)  Pulse: 91 (08/05/20 0200)  Resp: (!) 22 (08/05/20 0044)  BP: 109/65 (08/05/20 0000)  SpO2: 100 % (08/05/20 0200)    Vital Signs Range (Last 24H):  Temp:  [98.4 °F (36.9 °C)-99.9 °F (37.7 °C)]   Pulse:  []   Resp:  [13-37]   BP: ()/(53-75)   SpO2:  [85 %-100 %]   Arterial Line BP: (107-140)/(49-57)     Physical Exam  Vitals signs and nursing note reviewed.   Constitutional:       Appearance: He is ill-appearing.      Interventions: He is intubated.   HENT:      Head: Normocephalic and atraumatic.      Right Ear: External ear normal.      Left Ear: External ear normal.      Nose: Nose normal.      Mouth/Throat:      Mouth: Mucous membranes are moist.   Eyes:      General:         Right eye: No discharge.         Left eye: No discharge.      Conjunctiva/sclera: Conjunctivae normal.      Pupils: Pupils are equal, round, and reactive to light.   Neck:      Musculoskeletal: Neck supple.   Cardiovascular:      Rate and Rhythm: Normal rate.   Pulmonary:      Effort: He is intubated.   Abdominal:      General: There is no distension.      Palpations: Abdomen is soft.   Musculoskeletal:         General: Swelling (BUE) present.   Skin:     General: Skin is warm and dry.      Findings: Bruising present.         Neurological Exam:   LOC: obtunded  Articulation: Untestable due to intubation      Laboratory:  CMP:   Recent Labs   Lab 08/04/20 2236   CALCIUM 6.9*   ALBUMIN 1.3*   PROT 5.2*   *   K 3.8   CO2 22*      BUN 97*   CREATININE 4.3*   ALKPHOS 58   *   *   BILITOT 0.9     CBC:   Recent Labs   Lab 08/04/20 2236   WBC 13.23*   RBC 3.51*   HGB 11.7*   HCT 33.9*   PLT 79*   MCV 97   MCH 33.3*   MCHC 34.5     Lipid Panel:   Recent Labs   Lab 08/05/20  0005   CHOL 71*   LDLCALC 38.6*   HDL 7*   TRIG 127      Coagulation:   Recent Labs   Lab 08/04/20  2236 08/05/20  0223   INR 2.7*  --    APTT 58.3* 39.6*     Hgb A1C:   Recent Labs   Lab 08/05/20  0005   HGBA1C 6.0*     TSH:   Recent Labs   Lab 08/05/20  0005   TSH 2.230       Diagnostic Results:      Brain imaging:  [facility] 08/02/2020 Study is significantly degraded by motion artifact limiting evaluation.  9 mm hypodensity right lentiform nucleus not definitively seen on the prior MRI or CT raising question recent lacunar infarct.  Less than 5 mm hypodensity deep white matter adjacent to midportion right lateral ventricle noted not definitively seen on the prior CT or MRI and as such may reflect recent small deep white matter infarct.  Follow-up MRI can be obtained to better assess these areas.  Mild atrophy and mild presumed microvascular ischemic changes cerebral white matter including what appears to represent 5 mm lateral left parietooccipital subcortical infarct unchanged.    MRI Brain pending      Vessel Imaging:  MRA Brain and Neck pending    Cardiac Evaluation:   TTE 08/03/2020    Conclusion  · Technically challanging. Very limited views and very poor image quality.  · Normal left ventricular systolic function. The estimated ejection fraction appears to be preserved, about 55%.  · No obvious wall motion abnormalities.  · Indeterminate left ventricular diastolic function.  · Concentric left ventricular remodeling.  · Normal right ventricular systolic function.  · The tricuspid regurgitation (TR) jet is insufficient to calculate the pulmonary artery pressure.  · No obvious significant valvular abnormalities are observed, though, valves are very poorly visualized in this very limited echo.  · Mechanically ventilated; cannot use inferior caval vein diameter to estimate central venous pressure.

## 2020-08-05 NOTE — HPI
Patient not seen in person due to concerns for COVID-19. Virtual consult.    Patient is a 69 year old male with HTN, alcoholism who was admitted on 7/19 for AMS, shaking with concerns for alcohol withdrawal. Found to be COVID positive. Hospital course was treated with dexamethasone and Remdesivir but patient continued to require O2. Due to persistently elevated D-Dimer patient started on therapeutic heparin initially on 7/23 but was transitioned to therapeutic Lovenox which was stopped on 7/30. On 8/2, patient had acute desaturations along with tachycardia and was restarted on therapeutic heparin. Platelets at this time were 152 and precipitously dropped over the next 24 hours to 63. Patient transferred to [facility] on 8/4 for escalation of care and concerns for NC status.    Initial workup by neurocritical care included MRI brain showing scattered lacunar infarcts concerning for cardio-embolic process as well as U/S showing multiple DVT in the left popliteal, right subclavian , and right IJ veins. Patient initiated on argatroban.

## 2020-08-05 NOTE — PROCEDURES
EEG Extended Monitoring greater than one hour    Date/Time: 8/5/2020 3:07 PM  Performed by: Patricio Vallecillo MD  Authorized by: Danny Alvarado NP       ICU EEG/VIDEO MONITORING REPORT    DATE OF SERVICE: 8/5/20  EEG NUMBER:   REQUESTED BY:   LOCATION OF SERVICE:     METHODOLOGY   Electroencephalographic (EEG) recording is with electrodes placed according to the International 10-20 placement system.  Thirty two (32) channels of digital signal are simultaneously recorded from the scalp and may include EKG, EMG, and/or eye monitors.   Recording band pass was 0.1 to 512 hz.  Digital video recording of the patient is simultaneously recorded with the EEG.  The nursing staff report clinical symptoms and may press an event button when the patient has symptoms of clinical interest to the treating physicians.  EEG and video recording is stored and archived in digital format.  The entire recording is visually reviewed and the times identified by computer analysis as being spikes or seizures are reviewed again.  Activation procedures which include photic stimulation, hyperventilation and instructing patients to perform simple task are done in selected patients.   Compresses spectral analysis (CSA) is also performed on the activity recorded from each individual channel.  This is displayed as a power display of frequencies from 0 to 30 Hz over time.   The CSA analysis is done and displayed continuously.  This is reviewed for asymmetries in power between homologous areas of the scalp and for presence of changes in power which canbe seen when seizures occur.  Sections of suspected abnormalities on the CSA is then compared with the original EEG recording.     [employer] software was also utilized in the review of this study.  This software suite analyzes the EEG recording in multiple domains.  Coherence and rhythmicity is computed to identify EEG sections which may contain organized seizures.  Each channel undergoes analysis to detect  presence of spike and sharp waves which have special and morphological characteristic of epileptic activity.  The routine EEG recording is converted from spacial into frequency domain.  This is then displayed comparing homologous areas to identify areas of significant asymmetry.  Algorithm to identify non-cortically generated artifact is used to separate eye movement, EMG and other artifact from the EEG.      Recording Times  Start on 8/5/20 at 05:54:43  Stop on 8/5/20 at 13:59:01  A total of 8 hours of EEG was recorded.    EEG FINDINGS  The record is significantly limited by electrode artifact but shows a poor organization at rest, consisting of no discernible posterior dominant rhythm with fair  reactivity. There is mild bilateral beta activity. There is continuous diffuse theta and delta range background slowing.    Drowsiness is characterized by attenuation of the background, vertex waves, and bilateral theta slowing. Sleep architecture is not seen.    Provocative maneuvers including hyperventilation and photic stimulation were not performed.     EKG recording shows a regular rhythm.    There is no push button or clinical event.    IMPRESSION:  Abnormal study due to the presence of moderate  diffuse background slowing consistent with diffuse cerebral dysfunction and encephalopathy which may be on the basis of toxic, metabolic, or primary neuronal disorder.     Patricio Vallecillo MD  Department of Neurology  [facility]

## 2020-08-05 NOTE — ASSESSMENT & PLAN NOTE
concern for portal vein thrombus on US of liver at OSH  On Argatroban gtt, hypercoagulable d/t covid 19  Liver enzymes

## 2020-08-05 NOTE — PLAN OF CARE
Pt transferred from [facility] with AMS.  PMHx of HTN and alcohol abuse.  COVID+ 7/19/2020.  Pt is intubated and sedated.    CM spoke with pt's vinnyece Mikala Gutierrez [phone] to complete discharge assessment.  Pt lives with his brother and address updarted in pt's record.  Lives in an apartment, no steps.  His niece will provide transportation home.  Per niece he has no equipment at home and was independent functional status.  Not a dialysis pt and not on Coumadin.  When medically stable, anticipate DC plan A - LTAC or plan B - SNF.    Venice Hernandez MD     Payor: MEDICARE / Plan: MEDICARE PART A & B / Product Type: Government /        [facility]/pharmacy #[id number] - [place] - [address]  [address]  [place] [address]  Phone: [phone] Fax: [phone]     08/05/20 1443   Discharge Assessment   Assessment Type Discharge Planning Assessment   Confirmed/corrected address and phone number on facesheet? Yes   Assessment information obtained from? Other  (Spoke with pt's bradley Gutierrez [phone])   Expected Length of Stay (days) 6   Prior to hospitilization cognitive status: Alert/Oriented   Prior to hospitalization functional status: Independent   Current cognitive status: Coma/Sedated/Intubated   Current Functional Status:   (Intubated and sedated)   Facility Arrived From: [facility]   Lives With sibling(s)  (lives with his brother)   Is patient able to care for self after discharge? Unable to determine at this time (comments)   Who are your caregiver(s) and their phone number(s)? Bradley Dewitt [phone], Sister Meghana Mendoza [phone]   Readmission Within the Last 30 Days current reason for admission unrelated to previous admission   Patient currently being followed by outpatient case management? No   Patient currently receives any other outside agency services? No   Equipment Currently Used at Home none   Do you have any problems affording any of your  prescribed medications? TBD   Is the patient taking medications as prescribed? yes   Does the patient have transportation home? Yes   Transportation Anticipated family or friend will provide  (aster Bach)   Dialysis Name and Scheduled days NA   Does the patient receive services at the Coumadin Clinic? No   Discharge Plan A Long-term acute care facility (LTAC)   Discharge Plan B Skilled Nursing Facility   DME Needed Upon Discharge  other (see comments)  (TBD)   Patient/Family in Agreement with Plan yes   Readmission Questionnaire   At the time of your discharge, did someone talk to you about what your health problems were? Yes   At the time of discharge, did someone talk to you about what to watch out for regarding worsening of your health problem? Yes   At the time of discharge, did someone talk to you about what to do if you experienced worsening of your health problem? Yes   At the time of discharge, did someone talk to you about which medication to take when you left the hospital and which ones to stop taking? Yes   At the time of discharge, did someone talk to you about when and where to follow up with a doctor after you left the hospital? Yes   Living Arrangements [place]     Sussy Mckeon RN   [id number]

## 2020-08-05 NOTE — CONSULTS
[facility] - ICU 16 WT  Hematology/Oncology  Consult Note    Patient Name: Atul Mendoza  MRN: [id number]  Admission Date: 8/4/2020  Hospital Length of Stay: 1 days  Code Status: Full Code   Attending Provider: Anson Almanzar MD  Consulting Provider: Miguel Moctezuma MD  Primary Care Physician: Venice Hernandez MD  Principal Problem:Acute encephalopathy    Inpatient consult to Hematology  Consult performed by: Miguel Moctezuma MD  Consult ordered by: Omi Naqvi NP        Subjective:     HPI:  Patient not seen in person due to concerns for COVID-19. Virtual consult.    Patient is a 69 year old male with HTN, alcoholism who was admitted on 7/19 for AMS, shaking with concerns for alcohol withdrawal. Found to be COVID positive. Hospital course was treated with dexamethasone and Remdesivir but patient continued to require O2. Due to persistently elevated D-Dimer patient started on therapeutic heparin initially on 7/23 but was transitioned to therapeutic Lovenox which was stopped on 7/30. On 8/2, patient had acute desaturations along with tachycardia and was restarted on therapeutic heparin. Platelets at this time were 152 and precipitously dropped over the next 24 hours to 63. Patient transferred to [facility] [place] on 8/4 for escalation of care and concerns for NC status.    Initial workup by neurocritical care included MRI brain showing scattered lacunar infarcts concerning for cardio-embolic process as well as U/S showing multiple DVT in the left popliteal, right subclavian , and right IJ veins. Patient initiated on argatroban.    Oncology Treatment Plan:   [No treatment plan]    Medications:  Continuous Infusions:   sodium chloride 0.9% 75 mL/hr at 08/05/20 0900    argatroban in 0.9 % sod chlor 0.632 mcg/kg/min (08/05/20 0900)    dextrose 10 % in water (D10W)      norepinephrine bitartrate-D5W Stopped (08/05/20 0700)    propofol 15 mcg/kg/min (08/05/20 1135)     Scheduled Meds:    albuterol-ipratropium  3 mL Nebulization Q6H    atorvastatin  40 mg Oral Daily    [START ON 8/6/2020] ciprofloxacin HCl  500 mg Oral Q24H    famotidine  20 mg Per NG tube Daily    hydrocortisone sodium succinate  50 mg Intravenous Q8H    lactulose  15 g Per NG tube TID    oxacillin 12 g in  mL CONTINUOUS INFUSION  12 g Intravenous Q24H    potassium chloride  60 mEq Per NG tube Once     PRN Meds:acetaminophen, dextrose 10 % in water (D10W), dextrose 50%, glucagon (human recombinant), insulin aspart U-100, iohexoL, ondansetron, sodium chloride 0.9%     Review of patient's allergies indicates:  No Known Allergies     Past Medical History:   Diagnosis Date    Hypertension      Past Surgical History:   Procedure Laterality Date    CATARACT EXTRACTION W/  INTRAOCULAR LENS IMPLANT Right 12/12/2019    Procedure: EXTRACTION, CATARACT, WITH IOL INSERTION;  Surgeon: Alcides Holcomb MD;  Location: [facility];  Service: Ophthalmology;  Laterality: Right;    PHACOEMULSIFICATION OF CATARACT Right 12/12/2019    Procedure: PHACOEMULSIFICATION, CATARACT;  Surgeon: Alcides Holcomb MD;  Location: [facility];  Service: Ophthalmology;  Laterality: Right;     Family History     Problem Relation (Age of Onset)    Heart attack Father        Tobacco Use    Smoking status: Never Smoker    Smokeless tobacco: Never Used   Substance and Sexual Activity    Alcohol use: Yes     Comment: social    Drug use: Never    Sexual activity: Not on file       Review of Systems   Unable to perform ROS: Intubated     Objective:     Vital Signs (Most Recent):  Temp: 96.2 °F (35.7 °C)(jessica hugger applied) (08/05/20 1400)  Pulse: 82 (08/05/20 1500)  Resp: 19 (08/05/20 1433)  BP: 123/64 (08/05/20 1500)  SpO2: 99 % (08/05/20 1433) Vital Signs (24h Range):  Temp:  [96.2 °F (35.7 °C)-99.4 °F (37.4 °C)] 96.2 °F (35.7 °C)  Pulse:  [] 82  Resp:  [17-34] 19  SpO2:  [85 %-100 %] 99 %  BP: ()/(56-69) 123/64  Arterial Line BP: (107-167)/(49-75)  133/55     Weight: 76.5 kg (168 lb 10.4 oz)  Body mass index is 22.87 kg/m².  Body surface area is 1.97 meters squared.      Intake/Output Summary (Last 24 hours) at 8/5/2020 1524  Last data filed at 8/5/2020 1100  Gross per 24 hour   Intake 1183.82 ml   Output 350 ml   Net 833.82 ml       Physical Exam  **patient not seen in person due to COVID-19 concerns. Virtual consult performed.    Significant Labs:   CBC:   Recent Labs   Lab 08/04/20 0330 08/04/20 2236 08/05/20  0533   WBC 18.44* 13.23* 13.92*   HGB 14.2 11.7* 11.7*   HCT 40.8 33.9* 34.7*   PLT 84* 79* 64*    and CMP:   Recent Labs   Lab 08/04/20 0330 08/04/20 2236 08/05/20  0533 08/05/20  1425    135* 134* 137   K 4.3 3.8 3.5 3.1*    104 103 106   CO2 20* 22* 22* 23   * 220* 263* 293*   BUN 85* 97* 99* 92*   CREATININE 4.0* 4.3* 4.0* 3.6*   CALCIUM 7.5* 6.9* 6.9* 7.0*   PROT 5.8* 5.2* 5.5*  --    ALBUMIN 1.3* 1.3* 1.4* 1.3*   BILITOT 1.1* 0.9 0.8  --    ALKPHOS 53* 58 63  --    * 162* 121*  --    * 134* 130*  --    ANIONGAP 15 9 9 8   EGFRNONAA 14.5* 13.3* 14.5* 16.5*         Assessment/Plan:     Thrombocytopenia  Overall, clinical picture strongly suggestive of HIT. 4T score of 7 (high probability of HIT). Heparin resistance in the setting of hypercoagulable state also possible given low Anti-Xa. Fibrinogen elevated, DIC unlikely.     Recs:  -agree with argatroban  -HIT pending, will send SABINE, although unlikely to   -avoid any and all heparin-containing or derived products; if HIT confirmed, will add heparin analogues as severe allergy in patient chart  -once platelets improve to >150, can consider overlapping treatment with warfarin until therapeutic if patient tolerating PO or enteral access        Thank you for your consult. I will follow-up with patient. Please contact us if you have any additional questions.    Miguel Moctezuma MD  Hematology/Oncology  [facility] - ICU 16 WT

## 2020-08-05 NOTE — ASSESSMENT & PLAN NOTE
emergently intubated after declined while on floor in OSH  COVID19 positive  CXR/ABG daily  Will assess and wean vent as able  Fio2 40, peep 5 at this time.

## 2020-08-05 NOTE — ASSESSMENT & PLAN NOTE
Overall, clinical picture strongly suggestive of HIT. 4T score of 7 (high probability of HIT). Heparin resistance in the setting of hypercoagulable state also possible given low Anti-Xa. Fibrinogen elevated, DIC unlikely.     Recs:  -agree with argatroban  -HIT pending, will send SABINE, although unlikely to   -avoid any and all heparin-containing or derived products; if HIT confirmed, will add heparin analogues as severe allergy in patient chart  -once platelets improve to >150, can consider overlapping treatment with warfarin until therapeutic if patient tolerating PO or enteral access

## 2020-08-05 NOTE — HPI
The patient is a 69 year-old male with a history significant for HTN, ETOH abuse, and now COVID-19 (diagnosed 7/19) who initially presented to a [facility] with complaints AMS and shaking. There were concerns for alcohol withdrawal at that time. He was admitted and treated with dexamethasone and Remdesivir but the patient continued to require O2. On 8/2, seems that the patient decompensated requiring ICU after developing tachycardia, hypotension, and acute hypoxia. He was noted to have an elevated d dimer and troponin with EKG changes. He was transferred to the ICU management and treatment of a PE and STEMI. He was started on Levophed for BP management. The patient had a CTH completed at that time which revealed a new right lacunar stroke. He subsequently [place] [facility] [place] for escalation of care and concerns for NC status. The patient currently remains intubated and sedated on Levophed and Argatroban. Initial workup by neurocritical care included MRI brain showing scattered lacunar infarcts concerning for cardio-embolic process as well as U/S showing multiple DVT in the left popliteal, right subclavian , and right IJ veins. Patient now with worsening renal function. The patient was admitted with a sCr of 2.0 on 7/19 (unsure of baseline renal function). The patient's sCr fluctuated during his stay at the OSH, but aggressively trended up on 8/2 after becoming hypotensive and tachycardic. Since transferring [place] [facility] the patient's serum creatinine has been stable at 4.0. His urine output was 350 mL/24 hrs. Nephrology consulted for Oliguric HINA.

## 2020-08-05 NOTE — CONSULTS
"[facility] - ICU 16 WT  Infectious Disease  Consult Note    Patient Name: Atul Mendoza  MRN: [id number]  Admission Date: 8/4/2020  Hospital Length of Stay: 1 days  Attending Physician: Anson Almanzar MD  Primary Care Provider: Venice Hernandez MD     Isolation Status: Airborne and Contact and Droplet    Patient information was obtained from past medical records and ER records.      Inpatient consult to Infectious Diseases  Consult performed by: Katie Najera MD  Consult ordered by: Omi Naqvi NP        Assessment/Plan:         MSSA bacteremia   70y/o M pt with PMHx of etoh abuse, HTN who initially presented to OSH for 2 day hx of "shaking", tested positive for COVID-19 and was admitted for viral pneumonia, transferred to [facility] on 8/4 for EEG as part of acute encephalopathy workup. Hospital course complicated by NSTEMI and high suspicion for PE for which he was started on ACS/PE protocol, respiratory failure and AMS requiring intubation and mechanical ventilation. Further workup revealed acute stroke with MRI findings concerning for cardioembolic etiology: "Scattered areas of acute infarction supratentorial and infratentorially in the anterior and posterior circulation." ID consulted for MSSA bacteremia.  --MSSA bacteremia possibly hospital acquired in the setting of superimposed bacterial pneumonia  --however, need to rule out infective endocarditis in the setting of likely cardioembolic stroke.  --BCx 8/2, MSSA  --BCx 8/3 s.aureus; susceptibilities pending  --BCx 8/5 NGTD  --RCx 8/2 MSSA and e.coli    --TTE 8/3 without evidence of HF or vegetations    Recommendations:  --stop vanc and ceftriaxone  --start oxacillin   --recommend SYLVIA to r/o infective endocarditis   --will f/u blood cultures      Pneumonia  Pt with hx of COVID-19 viral pneumonia and respiratory failure with concern for superimposed bacterial infection.  --sputum cx 8/2 grew MSSA and e.coli  --currently on day 4 vanc " "and rocephin    Recommendations:  --stop vanc and rocephin  --start oxacillin (MSSA coverage) and ciprofloxacin (e.coli coverage)  --estimated end date for cipro 8/8/20 for a total 7 day course      Thank you for your consult. I will follow-up with patient. Please contact us if you have any additional questions.    Katie Magana MD  Infectious Disease  [facility] - ICU 16 WT    Subjective:     Principal Problem: Acute encephalopathy    HPI: Mr. Mendoza is a 68y/o M pt with PMHx of etoh abuse, HTN who initially presented to OSH for 2 day hx of "shaking", tested positive for COVID-19 and was admitted for viral pneumonia, transferred to [facility] [place] on 8/4 for EEG as part of acute encephalopathy workup. Hospital course complicated by NSTEMI and high suspicion for PE for which he was started on ACS/PE protocol. Hep switched to argatroban due to concern for HIT, but HIT panel has now resulted neg. Also with AMS and worsening respiratory status requiring intubation and mechanical ventilation with subsequent need for pressor support. Had witnessed myoclonic jerking concerning for seizure activity. CTH concerning for acute infarcts. BCx 8/2 and 8/3 grew MSSA and Resp Cx 8/2 grew MSSA and e.coli. Pt started on vanc and zosyn on 8/2. TTE 8/3 without evidence of vegetations or HF. ID consulted on 8/4 and abx tailored to vanc and ceftriaxone. MRI 8/5 with "Scattered areas of acute infarction supratentorial and infratentorially in the anterior and posterior circulation concerning for cardioembolic etiology." US (8/5) with Lt popliteal and Rt subclavian vein DVT and concern for portal vein thrombosis (8/3).     ID consulted for MSSA bacteremia. Pt remains intubated. EEG pending.      Past Medical History:   Diagnosis Date    Hypertension        Past Surgical History:   Procedure Laterality Date    CATARACT EXTRACTION W/  INTRAOCULAR LENS IMPLANT Right 12/12/2019    Procedure: EXTRACTION, CATARACT, WITH IOL " INSERTION;  Surgeon: Alcides Holcomb MD;  Location: [facility];  Service: Ophthalmology;  Laterality: Right;    PHACOEMULSIFICATION OF CATARACT Right 12/12/2019    Procedure: PHACOEMULSIFICATION, CATARACT;  Surgeon: Alcides Holcomb MD;  Location: [facility];  Service: Ophthalmology;  Laterality: Right;       Review of patient's allergies indicates:  No Known Allergies    Medications:  Medications Prior to Admission   Medication Sig    amlodipine-benazepril 10-20mg (LOTREL) 10-20 mg per capsule Take 1 capsule by mouth once daily.    CIPROFLOXACIN HCL OPHT Apply to eye 4 (four) times daily.    difluprednate (DUREZOL) 0.05 % Drop ophthalmic solution 1 drop.    hydrOXYzine pamoate (VISTARIL) 25 MG Cap Take 1 capsule (25 mg total) by mouth every 8 (eight) hours as needed.    nepafenac (ILEVRO) 0.3 % DrpS Apply to eye every evening.     Antibiotics (From admission, onward)    Start     Stop Route Frequency Ordered    08/06/20 0900  ciprofloxacin HCl tablet 500 mg      -- Oral Every 24 hours 08/05/20 1420    08/05/20 1530  oxacillin 12 g in  mL CONTINUOUS INFUSION      -- IV Every 24 hours (non-standard times) 08/05/20 1420        Antifungals (From admission, onward)    None        Antivirals (From admission, onward)    None             There is no immunization history on file for this patient.    Family History     Problem Relation (Age of Onset)    Heart attack Father        Social History     Socioeconomic History    Marital status:      Spouse name: Not on file    Number of children: Not on file    Years of education: Not on file    Highest education level: Not on file   Occupational History    Not on file   Social Needs    Financial resource strain: Not on file    Food insecurity     Worry: Not on file     Inability: Not on file    Transportation needs     Medical: Not on file     Non-medical: Not on file   Tobacco Use    Smoking status: Never Smoker    Smokeless tobacco: Never Used   Substance  and Sexual Activity    Alcohol use: Yes     Comment: social    Drug use: Never    Sexual activity: Not on file   Lifestyle    Physical activity     Days per week: Not on file     Minutes per session: Not on file    Stress: Not on file   Relationships    Social connections     Talks on phone: Not on file     Gets together: Not on file     Attends Voodoo service: Not on file     Active member of club or organization: Not on file     Attends meetings of clubs or organizations: Not on file     Relationship status: Not on file   Other Topics Concern    Not on file   Social History Narrative    Not on file     Review of Systems   Unable to perform ROS: Intubated     Objective:     Vital Signs (Most Recent):  Temp: 96.2 °F (35.7 °C)(jessica hugger applied) (08/05/20 1400)  Pulse: 82 (08/05/20 1500)  Resp: 19 (08/05/20 1433)  BP: 123/64 (08/05/20 1500)  SpO2: 99 % (08/05/20 1433) Vital Signs (24h Range):  Temp:  [96.2 °F (35.7 °C)-99.4 °F (37.4 °C)] 96.2 °F (35.7 °C)  Pulse:  [] 82  Resp:  [17-34] 19  SpO2:  [85 %-100 %] 99 %  BP: ()/(56-69) 123/64  Arterial Line BP: (107-167)/(49-75) 133/55     Weight: 76.5 kg (168 lb 10.4 oz)  Body mass index is 22.87 kg/m².    Estimated Creatinine Clearance: 21.5 mL/min (A) (based on SCr of 3.6 mg/dL (H)).    Physical Exam  Vitals signs and nursing note reviewed.     deferred due to COVID-19     Significant Labs: All pertinent labs within the past 24 hours have been reviewed.    Significant Imaging: I have reviewed all pertinent imaging results/findings within the past 24 hours.

## 2020-08-05 NOTE — PROGRESS NOTES
Pharmacokinetic Initial Assessment: IV Vancomycin    Assessment/Plan:    Vancomycin was being pulse dosed at [facility] prior to admission to [facility]. Will continue to pulse dose vancomycin when vancomycin serum level <20 mcg/ml.  Desired empiric serum trough concentration is 15 to 20 mcg/mL  Draw vancomycin random level on 8/5/20 at 0500.  Pharmacy will continue to follow and monitor vancomycin.      Please contact pharmacy at extension [phone] with any questions regarding this assessment.     Thank you for the consult,   Jose Monroy       Patient brief summary:  Atul Mendoza is a 69 y.o. male initiated on antimicrobial therapy with IV Vancomycin for treatment of suspected bacteremia    Drug Allergies:   Review of patient's allergies indicates:  No Known Allergies    Actual Body Weight:   76.5 kg    Renal Function:   Estimated Creatinine Clearance: 19.4 mL/min (A) (based on SCr of 4 mg/dL (H)).,     Dialysis Method (if applicable):  N/A    CBC (last 72 hours):  Recent Labs   Lab Result Units 08/02/20  0030 08/02/20  0444 08/02/20  1428 08/02/20  2036 08/03/20  0617 08/03/20  2304 08/04/20  0330 08/04/20  2236   WBC K/uL 25.11* 14.24*  14.24* 22.73* 18.47* 15.28* 15.67* 18.44* 13.23*   Hemoglobin g/dL 17.8 17.1  17.1 15.0 12.7* 13.1* 14.3 14.2 11.7*   Hematocrit % 53.3 51.6  51.6 46.3 37.7* 38.2* 40.6 40.8 33.9*   Platelets K/uL 163 152  152 128* 83* 63* 79* 84* 79*   Gran% % 86.2* 81.0*  81.0* 68.0 68.0 84.0* 90.5* 89.5*  --    Lymph% % 6.7* 0.0*  0.0* 10.0* 7.0* 7.0* 3.3* 3.1*  --    Mono% % 6.2 4.0  4.0 3.0* 2.0* 4.0 5.3 6.3  --    Eosinophil% % 0.1 2.0  2.0 0.0 4.0 1.0 0.0 0.0  --    Basophil% % 0.2 0.0  0.0 0.0 0.0 0.0 0.1 0.1  --    Differential Method  Automated Manual  Manual Manual Manual Manual Automated Automated  --        Metabolic Panel (last 72 hours):  Recent Labs   Lab Result Units 08/02/20  0444 08/02/20  1322 08/02/20  1427 08/02/20  1428 08/02/20  2036 08/03/20  0617 08/04/20  0330  08/04/20  0924   Sodium mmol/L 146* 143  --  142 141 138 138  --    Sodium Urine Random mmol/L  --   --  <20  --   --   --   --   --    Potassium mmol/L 4.0 4.0  --  3.8 3.2* 4.1 4.3  --    Chloride mmol/L 104 99  --  101 111* 107 103  --    CO2 mmol/L 27 13*  --  12* 18* 21* 20*  --    Glucose mg/dL 108 340*  --  326* 172* 147* 174*  --    Glucose, CSF mg/dL  --   --   --   --   --   --   --  99*   Glucose, UA   --   --   --  Negative  --   --   --   --    BUN, Bld mg/dL 55* 64*  --  68* 65* 74* 85*  --    Creatinine mg/dL 3.0* 4.8*  --  4.6* 3.4* 3.6* 4.0*  --    Creatinine, Random Ur mg/dL  --   --  140.2  --   --   --   --   --    Albumin g/dL 1.8* 1.5*  --  1.4* 1.1* 1.2* 1.3*  --    Total Bilirubin mg/dL 1.6* 1.5*  --  1.5* 0.9 1.2* 1.1*  --    Alkaline Phosphatase U/L 48* 47*  --  43* 41* 40* 53*  --    AST U/L 48* 126*  --  153* 229* 207* 206*  --    ALT U/L 24 57*  --  71* 92* 108* 155*  --    Magnesium mg/dL 2.7*  --   --   --   --  2.4 2.6  --    Phosphorus mg/dL 4.8*  --   --   --   --  3.9 5.3*  --        Drug levels (last 3 results):  Recent Labs   Lab Result Units 08/03/20  0617 08/04/20  0330   Vancomycin, Random ug/mL 25.7 17.5       Microbiologic Results:  Microbiology Results (last 7 days)       Procedure Component Value Units Date/Time    Blood culture [[id number]]     Order Status: No result Specimen: Blood     Blood culture [[id number]]     Order Status: No result Specimen: Blood     Culture, Respiratory with Gram Stain [[id number]]     Order Status: No result Specimen: Respiratory

## 2020-08-05 NOTE — ASSESSMENT & PLAN NOTE
Hyper coag state, covid19  Started on heparin gtt, then switched to argatroban d/t concern of HIT  MI on admission OSH  US with concerns for portal vein thrombosis

## 2020-08-05 NOTE — CONSULTS
[facility] - ICU 16 WT  Nephrology  Consult Note    Patient Name: Atul Mendoza  MRN: [id number]  Admission Date: 8/4/2020  Hospital Length of Stay: 1 days  Attending Provider: Anson Almanzar MD   Primary Care Physician: Venice Hernandez MD  Principal Problem:Acute encephalopathy    Inpatient consult to Nephrology  Consult performed by: Manisha Brown DNP, FNP-C  Consult ordered by: Louise Roger MD  Reason for consult: HINA         Subjective:     HPI: The patient is a 69 year-old male with a history significant for HTN, ETOH abuse, and now COVID-19 (diagnosed 7/19) who initially presented to a [facility] with complaints AMS and shaking. There were concerns for alcohol withdrawal at that time. He was admitted and treated with dexamethasone and Remdesivir but the patient continued to require O2. On 8/2, seems that the patient decompensated requiring ICU after developing tachycardia, hypotension, and acute hypoxia. He was noted to have an elevated d dimer and troponin with EKG changes. He was transferred to the ICU management and treatment of a PE and STEMI. He was started on Levophed for BP management. The patient had a CTH completed at that time which revealed a new right lacunar stroke. He subsequently transferred to [facility] on 8/4 for escalation of care and concerns for NC status. The patient currently remains intubated and sedated on Levophed and Argatroban. Initial workup by neurocritical care included MRI brain showing scattered lacunar infarcts concerning for cardio-embolic process as well as U/S showing multiple DVT in the left popliteal, right subclavian , and right IJ veins. Patient now with worsening renal function. The patient was admitted with a sCr of 2.0 on 7/19 (unsure of baseline renal function). The patient's sCr fluctuated during his stay at the OSH, but aggressively trended up on 8/2 after becoming hypotensive and tachycardic. Since transferring to [facility] the  patient's serum creatinine has been stable at 4.0. His urine output was 350 mL/24 hrs. Nephrology consulted for Oliguric HINA.     Past Medical History:   Diagnosis Date    Hypertension        Past Surgical History:   Procedure Laterality Date    CATARACT EXTRACTION W/  INTRAOCULAR LENS IMPLANT Right 12/12/2019    Procedure: EXTRACTION, CATARACT, WITH IOL INSERTION;  Surgeon: Alcides Holcomb MD;  Location: [facility];  Service: Ophthalmology;  Laterality: Right;    PHACOEMULSIFICATION OF CATARACT Right 12/12/2019    Procedure: PHACOEMULSIFICATION, CATARACT;  Surgeon: Alcides Holcomb MD;  Location: [facility];  Service: Ophthalmology;  Laterality: Right;       Review of patient's allergies indicates:  No Known Allergies  Current Facility-Administered Medications   Medication Frequency    0.9%  NaCl infusion Continuous    acetaminophen tablet 650 mg Q6H PRN    albuterol-ipratropium 2.5 mg-0.5 mg/3 mL nebulizer solution 3 mL Q6H    argatroban in sodium chloride 0.9% (conc: 1 mg/mL) Continuous    atorvastatin tablet 40 mg Daily    [START ON 8/6/2020] ciprofloxacin HCl tablet 500 mg Q24H    dextrose 10 % infusion Continuous PRN    dextrose 50% injection 12.5 g PRN    famotidine tablet 20 mg Daily    glucagon (human recombinant) injection 1 mg PRN    hydrocortisone sodium succinate injection 50 mg Q8H    insulin aspart U-100 pen 1-10 Units Q4H PRN    iohexoL (OMNIPAQUE 350) injection 15 mL PRN    lactulose 20 gram/30 mL solution Soln 15 g TID    norepinephrine 4 mg in dextrose 5% 250 mL infusion (premix) (titrating) Continuous    ondansetron injection 4 mg Q8H PRN    oxacillin 12 g in  mL CONTINUOUS INFUSION Q24H    potassium chloride packet 60 mEq Once    propofol (DIPRIVAN) 10 mg/mL infusion (NON-TITRATING) Continuous    sodium chloride 0.9% flush 10 mL PRN     Family History     Problem Relation (Age of Onset)    Heart attack Father        Tobacco Use    Smoking status: Never Smoker    Smokeless  tobacco: Never Used   Substance and Sexual Activity    Alcohol use: Yes     Comment: social    Drug use: Never    Sexual activity: Not on file     Review of Systems   Unable to perform ROS: Intubated     Objective:     Vital Signs (Most Recent):  Temp: 96.2 °F (35.7 °C)(jessica hugger applied) (08/05/20 1400)  Pulse: 82 (08/05/20 1500)  Resp: 19 (08/05/20 1433)  BP: 123/64 (08/05/20 1500)  SpO2: 99 % (08/05/20 1433)  O2 Device (Oxygen Therapy): ventilator (08/05/20 1433) Vital Signs (24h Range):  Temp:  [96.2 °F (35.7 °C)-99.4 °F (37.4 °C)] 96.2 °F (35.7 °C)  Pulse:  [] 82  Resp:  [17-34] 19  SpO2:  [85 %-100 %] 99 %  BP: ()/(56-69) 123/64  Arterial Line BP: (107-167)/(49-75) 133/55     Weight: 76.5 kg (168 lb 10.4 oz) (08/05/20 0800)  Body mass index is 22.87 kg/m².  Body surface area is 1.97 meters squared.    I/O last 3 completed shifts:  In: 694.6 [I.V.:594.6; IV Piggyback:100]  Out: 350 [Urine:350]    Physical Exam  Vitals signs and nursing note reviewed.   Constitutional:       General: He is not in acute distress.     Appearance: He is ill-appearing.      Interventions: He is sedated and intubated.      Comments: A full-contact physical exam was not possible due to the clinical condition of the patient due to covid 19 infection and the necessity to minimize exposure.     Physical exam of the primary team reviewed.    HENT:      Head: Normocephalic and atraumatic.      Mouth/Throat:      Mouth: Mucous membranes are dry.   Cardiovascular:      Rate and Rhythm: Normal rate.   Pulmonary:      Effort: Pulmonary effort is normal. No respiratory distress. He is intubated.         Significant Labs:  CBC:   Recent Labs   Lab 08/05/20  0533   WBC 13.92*   RBC 3.57*   HGB 11.7*   HCT 34.7*   PLT 64*   MCV 97   MCH 32.8*   MCHC 33.7     CMP:   Recent Labs   Lab 08/05/20  0533 08/05/20  1425   * 293*   CALCIUM 6.9* 7.0*   ALBUMIN 1.4* 1.3*   PROT 5.5*  --    * 137   K 3.5 3.1*   CO2 22* 23   CL  103 106   BUN 99* 92*   CREATININE 4.0* 3.6*   ALKPHOS 63  --    *  --    *  --    BILITOT 0.8  --      All labs within the past 24 hours have been reviewed.      Assessment/Plan:     * Acute encephalopathy  - Management per primary team     HINA (acute kidney injury)  The patient is a 69 y.o. male with a significant medical history of HTN, ETOH abuse, now COVID-19 + who presents to the hospital as a transfer from [facility] for evaluation of altered mental status.  The patient decompensated at the OSH requiring intubation and pressor support after developing acute tachycardia and hypoxia thought to be due to PE and/or NSTEMI. the patient had a CTH which revealed findings for a new right lacunar stroke prompting his transfer to [facility].     The patient was admitted to the OSH with serum creatinine of 2.0 which fluctuated throughout his hospital stay. His renal function took a hit after he decompensated on 8/2. He arrived with a sCr of 4.0 on 8/4 which is essentially stable. His sCr has improved to 3.6 today. UOP remains marginal at this time. Volume status and respiratory status appears to be stable. Other electrolytes stable.     HINA 2/2 iATN due to hemodynamic instabilities in the setting of COVID-19 +/- acute NSTEMI/PE (likely 2/2 hypercoagulability due to COVID-19 infection).     UA: 0 protein, 3+ occult blood, 1+ glucose, 3+ leukocytes, rbc 32, wbc 47    Assessment:  -No urgent need for hemodialysis at this time. Electrolytes and acid base appears stable. sCr with some improvement, 4.0 to 3.6 on afternoon labs.   -Urine output marginal; consider diuretics for volume management if this becomes an concern.   -Continue Levophed as needed to prevent further hypotensive kidney insults.   -Frequent renal function panels, q6-8 hrs daily   -Strict I/Os and daily weights  -Renal US   -Urine for microscopy   -Avoid hypotension which may worsen HINA  -Currently on 1 pressors : Levophed    -Continue on mechanical ventilation with FiO  -Continue to monitor intake and output, daily weights   -Avoid nephrotoxic medication and renal dose medications to GFR  -Will discuss with staff    COVID-19 virus infection  - Management per primary team         Thank you for your consult. I will follow-up with patient. Please contact us if you have any additional questions.    Manisha Brown DNP, FNP-C  Nephrology  [facility] - ICU 16 WT

## 2020-08-05 NOTE — ASSESSMENT & PLAN NOTE
-Stroke risk factor.  Patient is currently on Levophed.  -Avoid hypotension, SBP>100  -Managed by primary team

## 2020-08-05 NOTE — SUBJECTIVE & OBJECTIVE
Past Medical History:   Diagnosis Date    Hypertension        Past Surgical History:   Procedure Laterality Date    CATARACT EXTRACTION W/  INTRAOCULAR LENS IMPLANT Right 12/12/2019    Procedure: EXTRACTION, CATARACT, WITH IOL INSERTION;  Surgeon: Alcides Holcomb MD;  Location: [facility];  Service: Ophthalmology;  Laterality: Right;    PHACOEMULSIFICATION OF CATARACT Right 12/12/2019    Procedure: PHACOEMULSIFICATION, CATARACT;  Surgeon: Alcides Holcomb MD;  Location: [facility];  Service: Ophthalmology;  Laterality: Right;       Review of patient's allergies indicates:  No Known Allergies    Medications:  Medications Prior to Admission   Medication Sig    amlodipine-benazepril 10-20mg (LOTREL) 10-20 mg per capsule Take 1 capsule by mouth once daily.    CIPROFLOXACIN HCL OPHT Apply to eye 4 (four) times daily.    difluprednate (DUREZOL) 0.05 % Drop ophthalmic solution 1 drop.    hydrOXYzine pamoate (VISTARIL) 25 MG Cap Take 1 capsule (25 mg total) by mouth every 8 (eight) hours as needed.    nepafenac (ILEVRO) 0.3 % DrpS Apply to eye every evening.     Antibiotics (From admission, onward)    Start     Stop Route Frequency Ordered    08/06/20 0900  ciprofloxacin HCl tablet 500 mg      -- Oral Every 24 hours 08/05/20 1420    08/05/20 1530  oxacillin 12 g in  mL CONTINUOUS INFUSION      -- IV Every 24 hours (non-standard times) 08/05/20 1420        Antifungals (From admission, onward)    None        Antivirals (From admission, onward)    None             There is no immunization history on file for this patient.    Family History     Problem Relation (Age of Onset)    Heart attack Father        Social History     Socioeconomic History    Marital status:      Spouse name: Not on file    Number of children: Not on file    Years of education: Not on file    Highest education level: Not on file   Occupational History    Not on file   Social Needs    Financial resource strain: Not on file    Food  insecurity     Worry: Not on file     Inability: Not on file    Transportation needs     Medical: Not on file     Non-medical: Not on file   Tobacco Use    Smoking status: Never Smoker    Smokeless tobacco: Never Used   Substance and Sexual Activity    Alcohol use: Yes     Comment: social    Drug use: Never    Sexual activity: Not on file   Lifestyle    Physical activity     Days per week: Not on file     Minutes per session: Not on file    Stress: Not on file   Relationships    Social connections     Talks on phone: Not on file     Gets together: Not on file     Attends Zoroastrian service: Not on file     Active member of club or organization: Not on file     Attends meetings of clubs or organizations: Not on file     Relationship status: Not on file   Other Topics Concern    Not on file   Social History Narrative    Not on file     Review of Systems   Unable to perform ROS: Intubated     Objective:     Vital Signs (Most Recent):  Temp: 96.2 °F (35.7 °C)(jessica hugger applied) (08/05/20 1400)  Pulse: 82 (08/05/20 1500)  Resp: 19 (08/05/20 1433)  BP: 123/64 (08/05/20 1500)  SpO2: 99 % (08/05/20 1433) Vital Signs (24h Range):  Temp:  [96.2 °F (35.7 °C)-99.4 °F (37.4 °C)] 96.2 °F (35.7 °C)  Pulse:  [] 82  Resp:  [17-34] 19  SpO2:  [85 %-100 %] 99 %  BP: ()/(56-69) 123/64  Arterial Line BP: (107-167)/(49-75) 133/55     Weight: 76.5 kg (168 lb 10.4 oz)  Body mass index is 22.87 kg/m².    Estimated Creatinine Clearance: 21.5 mL/min (A) (based on SCr of 3.6 mg/dL (H)).    Physical Exam  Vitals signs and nursing note reviewed.     deferred due to COVID-19     Significant Labs: All pertinent labs within the past 24 hours have been reviewed.    Significant Imaging: I have reviewed all pertinent imaging results/findings within the past 24 hours.

## 2020-08-05 NOTE — ASSESSMENT & PLAN NOTE
AMS at OSH,   CT with few lacunar infarcts on 8/2, shouldn't cause current poor exam  LP at OSH, WBC 1, protein 69, glucose 99  Concerns for seizures/status, keppra/propofol started, transferred to [facility] for [place]  Exam is poor, out of proportion to known lacunar infarcts and propofol infusion of 15mcg/kg/min  Plans for MRI to eval for new ischemic strokes, r/o hemorrhage given AC and no f/u scans since 8/3,  Will pace on EEG following MRI/MRA head & neck

## 2020-08-05 NOTE — ASSESSMENT & PLAN NOTE
Atul Mendoza is a 69 y.o. male with a significant medical history of HTN, ETOH abuse, now COVID-19 + who presents to the hospital as a transfer from [facility] for evaluation of altered mental status.  The patient is currently intubated, sedated, on levophed and argatroban.  A CTH was obtained on 8/2/2020 with findings concerning for new right lacunar stroke.  The patient remained without significant movement in his extremities despite noxious stimuli and sedation being turned off.  He was transferred for higher level of care.    Antithrombotics for secondary stroke prevention: Anticoagulants: Agatroban    Statins for secondary stroke prevention and hyperlipidemia, if present:   Statins: None: Reason: LDL 38.6.  Elevated LFTs.    Aggressive risk factor modification: HTN, ETOH abuse, COVID-19 infection     Rehab efforts: The patient has been evaluated by a stroke team provider and the therapy needs have been fully considered based off the presenting complaints and exam findings. The following therapy evaluations are needed: PT/OT/SLP evaluations once the patient is able to participate.    Diagnostics ordered/pending: MRA head to assess vasculature, MRA neck/arch to assess vasculature, MRI head without contrast to assess brain parenchyma    VTE prophylaxis: Mechanical prophylaxis: Place SCDs  None: Reason for No Pharmacological VTE Prophylaxis: Currently on anticoagulation    BP parameters: Infarct: Avoid hypotension (SBP>100).

## 2020-08-05 NOTE — H&P
"[facility] - ICU 16 WT  Neurocritical Care  History & Physical    Admit Date: 8/4/2020  Service Date: 08/05/2020  Length of Stay: 1    Subjective:     Chief Complaint: Acute encephalopathy    History of Present Illness:  69-year-old AA male with a PMHx of HTN and alcohol abuse who presents as transfer fro [facility]. He originally presented to the [facility] ED due shaking for 2 days. Patient was seen in the ED at [facility] for similar compliant and was discharged home on Hydroxyzine without improvement in his tremulousness. Patient reported occasional alcohol drinking, however when asked to quantify, he reported drinking at least a case of 12-bottles of beer daily for over 20 year. Unable to confirm or deny any history of withdrawal. When asked why he's in the hospital, he stated "I need to be fixed up" but did not report any specific complaint. Unable to answer living situation. Denied any F/C, HA, visual changes, rhinitis, sputum production, anosmia, ageusia, CP, SOB, N/V, abdominal pain, bleeding (hemoptysis / hematemesis, hematuria, BRBPR), C/D, or changes in urinary habits. Denied any sick contacts or recent travel. Patient stated that he does not want to drink alcohol anymore. He was admitted there and durign work-up was covid positive. Overnight he became Tachycardic (150s) with hypoxia (~80% SpO2) on NRB with Tachypnea.  Elevated D-Dimer and Troponins with EKG changes. Placed on ACS/VTE tx for PE vs MI. BIPAP palced with improvement in hypoxia 90% SpO2. Patient placed in ICU for closer monitoring and respiratory support with concerns for pending respiratory failure in the setting of PE and NSTEMI.    In the Am, he appeared more altered, and was intubated d/t concerns of resp. Failure and encephalopathy. He was started on pressors for hypotension. During this time, body jerking was noted, which prompted a CTH which showed new infarcts. He was started on Keppra and propofol, and [place] [facility] [place] ADRIANEE vincent. There was alos concerns for sepsis,  So ABX started.                Past Medical History:   Diagnosis Date    Hypertension      Past Surgical History:   Procedure Laterality Date    CATARACT EXTRACTION W/  INTRAOCULAR LENS IMPLANT Right 12/12/2019    Procedure: EXTRACTION, CATARACT, WITH IOL INSERTION;  Surgeon: Alcides Holcomb MD;  Location: [facility];  Service: Ophthalmology;  Laterality: Right;    PHACOEMULSIFICATION OF CATARACT Right 12/12/2019    Procedure: PHACOEMULSIFICATION, CATARACT;  Surgeon: Alcides Holcomb MD;  Location: [facility];  Service: Ophthalmology;  Laterality: Right;      Current Facility-Administered Medications on File Prior to Encounter   Medication Dose Route Frequency Provider Last Rate Last Dose    [COMPLETED] 0.9%  NaCl infusion   Intravenous Once Kassandra Beltrán MD 1,000 mL/hr at 08/04/20 0522 500 mL at 08/04/20 0522    [COMPLETED] lactated ringers bolus 1,000 mL  1,000 mL Intravenous Once Kassandra Beltrán MD   1,000 mL at 08/04/20 1147    [COMPLETED] vancomycin 750 mg in dextrose 5 % 250 mL IVPB (ready to mix system)  750 mg Intravenous Once Heriberto Tong  mL/hr at 08/04/20 0929 750 mg at 08/04/20 0929    [DISCONTINUED] 0.9%  NaCl infusion (for blood administration)   Intravenous Q24H PRN Kassandra Beltrán MD        [DISCONTINUED] 0.9%  NaCl infusion   Intravenous Once Kassandra Beltrán MD        [DISCONTINUED] acetaminophen tablet 650 mg  650 mg Oral Q4H PRN Cecy Zafar MD   650 mg at 08/04/20 0120    [DISCONTINUED] argatroban in sodium chloride 0.9% (conc: 1 mg/mL)  0.5 mcg/kg/min Intravenous Continuous Ernestina Chatterjee MD 2.3 mL/hr at 08/04/20 1409 0.5 mcg/kg/min at 08/04/20 1409    [DISCONTINUED] chlorhexidine 0.12 % solution 15 mL  15 mL Mouth/Throat BID Wolf Angel MD   15 mL at 08/04/20 0929    [DISCONTINUED] dexamethasone (DECADRON) 6 mg in sodium chloride 0.9% IVPB   Intravenous Daily Ernestina Chatterjee  mL/hr at 08/04/20 1738      [DISCONTINUED]  dexamethasone injection 6 mg  6 mg Intravenous Q24H Ernestina Chatterjee MD        [DISCONTINUED] dextrose 50% injection 12.5 g  12.5 g Intravenous PRN Wolf Angel MD        [DISCONTINUED] erythromycin 5 mg/gram (0.5 %) ophthalmic ointment   Both Eyes Q6H Cecy Zafar MD        [DISCONTINUED] fludrocortisone (FLORINEF) split tablet 50 mcg  50 mcg Per NG tube Daily Wolf Angel MD   50 mcg at 08/04/20 0930    [DISCONTINUED] folic acid tablet 1 mg  1 mg Per NG tube Daily Wolf Angel MD   1 mg at 08/04/20 0929    [DISCONTINUED] glucagon (human recombinant) injection 1 mg  1 mg Intramuscular PRN Wolf Angel MD        [DISCONTINUED] hydrocortisone sodium succinate injection 50 mg  50 mg Intravenous Q6H Wolf Angel MD   50 mg at 08/04/20 1156    [DISCONTINUED] insulin aspart U-100 pen 0-5 Units  0-5 Units Subcutaneous Q4H PRN Wolf Angel MD        [DISCONTINUED] levETIRAcetam in NaCl (iso-os) IVPB 500 mg  500 mg Intravenous Q12H Heriberto Tong  mL/hr at 08/04/20 0929 500 mg at 08/04/20 0929    [DISCONTINUED] norepinephrine 4 mg in dextrose 5 % 250 mL infusion  0.1 mcg/kg/min Intravenous Continuous Heriberto Tong MD 22.8 mL/hr at 08/04/20 1750 0.08 mcg/kg/min at 08/04/20 1750    [DISCONTINUED] norepinephrine 4 mg in dextrose 5% 250 mL infusion (premix) (titrating)  0.02 mcg/kg/min Intravenous Continuous Kassandra Beltrán MD 28.5 mL/hr at 08/04/20 1407 0.1 mcg/kg/min at 08/04/20 1407    [DISCONTINUED] pantoprazole injection 40 mg  40 mg Intravenous BID Wolf Angel MD   40 mg at 08/04/20 0929    [DISCONTINUED] piperacillin-tazobactam 4.5 g in dextrose 5 % 100 mL IVPB (ready to mix system)  4.5 g Intravenous Q12H Kassandra Beltrán MD 25 mL/hr at 08/04/20 1156 4.5 g at 08/04/20 1156    [DISCONTINUED] polyvinyl alcohol (artificial tears) 1.4 % ophthalmic solution 2 drop  2 drop Both Eyes QHS Cecy Zafar MD   2 drop at 08/03/20 2022    [DISCONTINUED] promethazine (PHENERGAN) 6.25  mg in dextrose 5 % 50 mL IVPB  6.25 mg Intravenous Q6H PRN Cecy Zafar MD        [DISCONTINUED] propofol (DIPRIVAN) 10 mg/mL infusion  5 mcg/kg/min Intravenous Continuous Kassandra Beltrán MD 6.8 mL/hr at 08/04/20 0650 15 mcg/kg/min at 08/04/20 0650    [DISCONTINUED] sodium chloride 0.9% flush 10 mL  10 mL Intravenous PRN Cecy Zafar MD        [DISCONTINUED] thiamine (B-1) 100 mg in dextrose 5 % 50 mL IVPB  100 mg Intravenous Daily Kassandra Beltrán MD 12.5 mL/hr at 08/04/20 0951 100 mg at 08/04/20 0951    [DISCONTINUED] vancomycin - pharmacy to dose   Intravenous pharmacy to manage frequency Kassandra Beltrán MD         Current Outpatient Medications on File Prior to Encounter   Medication Sig Dispense Refill    amlodipine-benazepril 10-20mg (LOTREL) 10-20 mg per capsule Take 1 capsule by mouth once daily.      CIPROFLOXACIN HCL OPHT Apply to eye 4 (four) times daily.      difluprednate (DUREZOL) 0.05 % Drop ophthalmic solution 1 drop.      hydrOXYzine pamoate (VISTARIL) 25 MG Cap Take 1 capsule (25 mg total) by mouth every 8 (eight) hours as needed. 10 capsule 0    nepafenac (ILEVRO) 0.3 % DrpS Apply to eye every evening.        Allergies: Patient has no known allergies.    Family History   Problem Relation Age of Onset    Heart attack Father      Social History     Tobacco Use    Smoking status: Never Smoker    Smokeless tobacco: Never Used   Substance Use Topics    Alcohol use: Yes     Comment: social    Drug use: Never     Review of Systems   Unable to perform ROS: Patient unresponsive     Objective:     Vitals:    Pulse: 84  BP: 109/65  MAP (mmHg): 82  Resp: (!) 22  SpO2: 100 %  Oxygen Concentration (%): 40  O2 Device (Oxygen Therapy): ventilator  Vent Mode: A/C  Set Rate: 12 BPM  Vt Set: 500 mL  PEEP/CPAP: 5 cmH20  Peak Airway Pressure: 27 cmH2O  Mean Airway Pressure: 11 cmH20    Temp  Min: 98.4 °F (36.9 °C)  Max: 99.9 °F (37.7 °C)  Pulse  Min: 81  Max: 128  BP  Min: 83/57  Max: 127/67  MAP  (mmHg)  Min: 62  Max: 86  CVP (mean)  Min: 12 mmHg  Max: 17 mmHg  Resp  Min: 13  Max: 37  SpO2  Min: 85 %  Max: 100 %  Oxygen Concentration (%)  Min: 40  Max: 40    No intake/output data recorded.           Physical Exam  Vitals signs and nursing note reviewed.   HENT:      Head: Normocephalic.   Cardiovascular:      Rate and Rhythm: Normal rate and regular rhythm.   Pulmonary:      Effort: Pulmonary effort is normal.   Abdominal:      General: Bowel sounds are normal.      Palpations: Abdomen is soft.   Skin:     Capillary Refill: Capillary refill takes 2 to 3 seconds.   Neurological:      Comments: [id number]  Exam on propofol 15mcg/kg/min    PERRLA, 3mm/3mm  Corneal reflex intact  Cough intact   Rotation/extension bilateral upper   No response bilateral lower extremities            Today I personally reviewed pertinent medications, lines/drains/airways, imaging, cardiology results, laboratory results, microbiology results, notably:        Assessment/Plan:     Neuro  * Acute encephalopathy  AMS at OSH,   CT with few lacunar infarcts on 8/2, shouldn't cause current poor exam  LP at OSH, WBC 1, protein 69, glucose 99  Concerns for seizures/status, keppra/propofol started, transferred to [facility] for EEG  Exam is poor, out of proportion to known lacunar infarcts and propofol infusion of 15mcg/kg/min  Plans for MRI to eval for new ischemic strokes, r/o hemorrhage given AC and no f/u scans since 8/3,  Will pace on EEG following MRI/MRA head & neck      Non-convulsive status epilepticus  transferred to [facility] to eval for NCSE  Keppra and propofol started at OSH  cEEG pending      Lacunar stroke  Lacunar strokes noted on CTH  8/2  Vascular Neurology consulted  Q1hr Neuro checks, SBP < 180  MRI head, MRA neck/head pending  On argatroban, hypercoag d/t covid19      Pulmonary  Acute respiratory failure with hypoxia  emergently intubated after declined while on floor in OSH  COVID19 positive  CXR/ABG daily  Will assess and wean vent as  able  Fio2 40, peep 5 at this time.       Cardiac/Vascular  ST elevation myocardial infarction (STEMI)  STEMI on 8/2, Troponin as high as 9.56  Will trend troponin while on levophed as it appears they were still elevated prior to transfer  Consider cardiology consult of do not decline, or levophed needs increase  Echo on 8/3, reported difficult to image, but EF 55%, no comments of wall motion abnormality      Renal/  HINA (acute kidney injury)  CRT 4.3, GFR 15.4  K < 5, no metabolic acidosis noted on ABG  Fio2 needs Are minimal, given COVID19   Follow I/O, making urine  Consider nephrology consult if acute changes    ID  Severe sepsis  Elevated lactic prior to arrival, will trend  Broad spec Abx continued form OSH  Pan Cx  On Levophed for BP support    Hematology  HIT (heparin-induced thrombocytopenia)  Concerned for HIT, heparin switched to argatroban     Hypercoagulopathy  Hyper coag state, covid19  Started on heparin gtt, then switched to argatroban d/t concern of HIT  MI on admission OSH  US with concerns for portal vein thrombosis     Portal vein thrombosis  concern for portal vein thrombus on US of liver at OSH  On Argatroban gtt, hypercoagulable d/t covid 19  Liver enzymes    Endocrine  Hyperglycemia  ISS q 4hr  Possibly d/t steriods that were received at OSH , as HgbA1c is 5.1     Other  Hypotension due to hypovolemia  Hypotensive requiring Levo Gtt  Thought d/thypovolemia from sepsis  On ABX    COVID-19 virus infection  covid positive, intubated at OSH   Placed on Covid Unit,           The patient is being Prophylaxed for:  Venous Thromboembolism with: Mechanical or Chemical  Stress Ulcer with: PPI  Ventilator Pneumonia with: chlorhexidine oral care    Activity Orders          Diet NPO: NPO starting at 08/05 0002        Full Code   Critical Care: 60 min    Danny Alvarado NP  Neurocritical Care  [facility] - ICU 16 WT

## 2020-08-05 NOTE — HPI
"Mr. Mendoza is a 68y/o M pt with PMHx of etoh abuse, HTN who initially presented to OSH for 2 day hx of "shaking", tested positive for COVID-19 and was admitted for viral pneumonia, [place] [facility] [place] for EEG as part of acute encephalopathy workup. Hospital course complicated by NSTEMI and high suspicion for PE for which he was started on ACS/PE protocol. Hep switched to argatroban due to concern for HIT, but HIT panel has now resulted neg. Also with AMS and worsening respiratory status requiring intubation and mechanical ventilation with subsequent need for pressor support. Had witnessed myoclonic jerking concerning for seizure activity. CTH concerning for acute infarcts. BCx 8/2 and 8/3 grew MSSA and Resp Cx 8/2 grew MSSA and e.coli. Pt started on vanc and zosyn on 8/2. TTE 8/3 without evidence of vegetations or HF. ID consulted on 8/4 and abx tailored to vanc and ceftriaxone. MRI 8/5 with "Scattered areas of acute infarction supratentorial and infratentorially in the anterior and posterior circulation concerning for cardioembolic etiology." US (8/5) with Lt popliteal and Rt subclavian vein DVT and concern for portal vein thrombosis (8/3).     ID consulted for MSSA bacteremia. Pt remains intubated. EEG pending.    "

## 2020-08-05 NOTE — SUBJECTIVE & OBJECTIVE
Oncology Treatment Plan:   [No treatment plan]    Medications:  Continuous Infusions:   sodium chloride 0.9% 75 mL/hr at 08/05/20 0900    argatroban in 0.9 % sod chlor 0.632 mcg/kg/min (08/05/20 0900)    dextrose 10 % in water (D10W)      norepinephrine bitartrate-D5W Stopped (08/05/20 0700)    propofol 15 mcg/kg/min (08/05/20 1135)     Scheduled Meds:   albuterol-ipratropium  3 mL Nebulization Q6H    atorvastatin  40 mg Oral Daily    [START ON 8/6/2020] ciprofloxacin HCl  500 mg Oral Q24H    famotidine  20 mg Per NG tube Daily    hydrocortisone sodium succinate  50 mg Intravenous Q8H    lactulose  15 g Per NG tube TID    oxacillin 12 g in  mL CONTINUOUS INFUSION  12 g Intravenous Q24H    potassium chloride  60 mEq Per NG tube Once     PRN Meds:acetaminophen, dextrose 10 % in water (D10W), dextrose 50%, glucagon (human recombinant), insulin aspart U-100, iohexoL, ondansetron, sodium chloride 0.9%     Review of patient's allergies indicates:  No Known Allergies     Past Medical History:   Diagnosis Date    Hypertension      Past Surgical History:   Procedure Laterality Date    CATARACT EXTRACTION W/  INTRAOCULAR LENS IMPLANT Right 12/12/2019    Procedure: EXTRACTION, CATARACT, WITH IOL INSERTION;  Surgeon: Alcides Holcomb MD;  Location: [facility];  Service: Ophthalmology;  Laterality: Right;    PHACOEMULSIFICATION OF CATARACT Right 12/12/2019    Procedure: PHACOEMULSIFICATION, CATARACT;  Surgeon: Alcides Holcomb MD;  Location: [facility];  Service: Ophthalmology;  Laterality: Right;     Family History     Problem Relation (Age of Onset)    Heart attack Father        Tobacco Use    Smoking status: Never Smoker    Smokeless tobacco: Never Used   Substance and Sexual Activity    Alcohol use: Yes     Comment: social    Drug use: Never    Sexual activity: Not on file       Review of Systems   Unable to perform ROS: Intubated     Objective:     Vital Signs (Most Recent):  Temp: 96.2 °F (35.7 °C)(jessica  hugger applied) (08/05/20 1400)  Pulse: 82 (08/05/20 1500)  Resp: 19 (08/05/20 1433)  BP: 123/64 (08/05/20 1500)  SpO2: 99 % (08/05/20 1433) Vital Signs (24h Range):  Temp:  [96.2 °F (35.7 °C)-99.4 °F (37.4 °C)] 96.2 °F (35.7 °C)  Pulse:  [] 82  Resp:  [17-34] 19  SpO2:  [85 %-100 %] 99 %  BP: ()/(56-69) 123/64  Arterial Line BP: (107-167)/(49-75) 133/55     Weight: 76.5 kg (168 lb 10.4 oz)  Body mass index is 22.87 kg/m².  Body surface area is 1.97 meters squared.      Intake/Output Summary (Last 24 hours) at 8/5/2020 1524  Last data filed at 8/5/2020 1100  Gross per 24 hour   Intake 1183.82 ml   Output 350 ml   Net 833.82 ml       Physical Exam  **patient not seen in person due to COVID-19 concerns. Virtual consult performed.    Significant Labs:   CBC:   Recent Labs   Lab 08/04/20 0330 08/04/20 2236 08/05/20  0533   WBC 18.44* 13.23* 13.92*   HGB 14.2 11.7* 11.7*   HCT 40.8 33.9* 34.7*   PLT 84* 79* 64*    and CMP:   Recent Labs   Lab 08/04/20 0330 08/04/20 2236 08/05/20  0533 08/05/20  1425    135* 134* 137   K 4.3 3.8 3.5 3.1*    104 103 106   CO2 20* 22* 22* 23   * 220* 263* 293*   BUN 85* 97* 99* 92*   CREATININE 4.0* 4.3* 4.0* 3.6*   CALCIUM 7.5* 6.9* 6.9* 7.0*   PROT 5.8* 5.2* 5.5*  --    ALBUMIN 1.3* 1.3* 1.4* 1.3*   BILITOT 1.1* 0.9 0.8  --    ALKPHOS 53* 58 63  --    * 162* 121*  --    * 134* 130*  --    ANIONGAP 15 9 9 8   EGFRNONAA 14.5* 13.3* 14.5* 16.5*

## 2020-08-05 NOTE — PLAN OF CARE
Problem: Oral Intake Inadequate (Acute Kidney Injury/Impairment)  Goal: Optimal Nutrition Intake  Outcome: Ongoing, Not Progressing  Intervention: Promote and Optimize Nutrition  Flowsheets (Taken 8/5/2020 0933)  Oral Nutrition Promotion: other (see comments)     Problem: Eating/Swallowing Impairment (Stroke, Ischemic/Transient Ischemic Attack)  Goal: Oral Intake without Aspiration  Outcome: Ongoing, Progressing  Intervention: Optimize Eating and Swallowing  Flowsheets (Taken 8/5/2020 0933)  Nutrition Support Management:   tube feeding held   other (see comments)   weight trending reviewed     Problem: Nutrition Impairment (Mechanical Ventilation, Invasive)  Goal: Optimal Nutrition Delivery  Outcome: Ongoing, Progressing  Intervention: Optimize Nutrition Delivery  Flowsheets (Taken 8/5/2020 0933)  Nutrition Support Management:   tube feeding held   other (see comments)   weight trending reviewed     Problem: Nutrition Impaired (Sepsis/Septic Shock)  Goal: Optimal Nutrition Intake  Outcome: Ongoing, Progressing  Intervention: Promote and Optimize Nutrition Delivery  Flowsheets (Taken 8/5/2020 0933)  Nutrition Support Management:   tube feeding held   other (see comments)   weight trending reviewed     Recommendations     1.) Begin enteral nutrition within 48hr: Peptamen Intense VHP @ 30mL/hr increasing 10mL q4h to goal rate 50mL/hr to provide 1200 kcals, 110gm protein and 1008mL of free water.               MD to manage fluid.               Hold if GRV >500mL.               TF + current propofol rate to meet 71% EEN and 100% EPN.   2.) Add COVID19+ supplements: vitamin C, vitamin D, zinc              Also add: MVI, folic acid, thiamine.   3.) Daily weights.      Goals:   1.) Pt to return/meet >75% EEN and EPN by follow up.   Nutrition Goal Status: new  Communication of RD Recs: other (comment)(POC)

## 2020-08-05 NOTE — PT/OT/SLP PROGRESS
Physical Therapy      Patient Name:  Atul Mendoza   MRN:  [id number]    Patient not seen today secondary to (pt on hold 2nd to being intubated and sedated.). Will follow-up at a later date..    Jory Muse, PT   8/5/2020

## 2020-08-05 NOTE — ASSESSMENT & PLAN NOTE
transferred [place] [facility] [place] for NCSE  Keppra and propofol started at OSH  cEEG pending

## 2020-08-05 NOTE — ASSESSMENT & PLAN NOTE
Pt with hx of COVID-19 viral pneumonia and respiratory failure with concern for superimposed bacterial infection.  --sputum cx 8/2 grew MSSA and e.coli  --currently on day 4 vanc and rocephin    Recommendations:  --stop vanc and rocephin  --start oxacillin (MSSA coverage) and ciprofloxacin (e.coli coverage)  --estimated end date for cipro 8/8/20 for a total 7 day course

## 2020-08-05 NOTE — CONSULTS
Therapy with Vancomycin complete and/or consult discontinued by provider.  Pharmacy will sign off, please re-consult as needed.    Thank you,  Laura Herrera, PharmD  PGY-2 Internal Medicine Pharmacy Resident  [id number]

## 2020-08-05 NOTE — ASSESSMENT & PLAN NOTE
The patient is a 69 y.o. male with a significant medical history of HTN, ETOH abuse, now COVID-19 + who presents to the hospital as a transfer from [facility] for evaluation of altered mental status.  The patient decompensated at the OSH requiring intubation and pressor support after developing acute tachycardia and hypoxia thought to be due to PE and/or NSTEMI. the patient had a CTH which revealed findings for a new right lacunar stroke prompting [place] [facility] [place]     The patient was admitted to the OSH with serum creatinine of 2.0 which fluctuated throughout his hospital stay. His renal function took a hit after he decompensated on 8/2. He arrived with a sCr of 4.0 on 8/4 which is essentially stable. His sCr has improved to 3.6 today. UOP remains marginal at this time. Volume status and respiratory status appears to be stable. Other electrolytes stable.     HINA 2/2 iATN due to hemodynamic instabilities in the setting of COVID-19 +/- acute NSTEMI/PE (likely 2/2 hypercoagulability due to COVID-19 infection).     UA: 0 protein, 3+ occult blood, 1+ glucose, 3+ leukocytes, rbc 32, wbc 47    Assessment:  -No urgent need for hemodialysis at this time. Electrolytes and acid base appears stable. sCr with some improvement, 4.0 to 3.6 on afternoon labs.   -Urine output marginal; consider diuretics for volume management if this becomes an concern.   -Continue Levophed as needed to prevent further hypotensive kidney insults.   -Frequent renal function panels, q6-8 hrs daily   -Strict I/Os and daily weights  -Renal US   -Urine for microscopy   -Avoid hypotension which may worsen HINA  -Currently on 3 pressors : Levophed   -Continue on mechanical ventilation with FiO  -Continue to monitor intake and output, daily weights   -Avoid nephrotoxic medication and renal dose medications to GFR  -Will discuss with staff

## 2020-08-05 NOTE — ASSESSMENT & PLAN NOTE
Elevated lactic prior to arrival, will trend  Broad spec Abx continued form OSH  Pan Cx  On Levophed for BP support

## 2020-08-05 NOTE — ASSESSMENT & PLAN NOTE
-Stroke risk factor.  Patient w/o history of DM II.    -Recent glucose of 220  -Recommend tight glucose control, glucose 140-180

## 2020-08-05 NOTE — PT/OT/SLP PROGRESS
Occupational Therapy      Patient Name:  Atul Mendoza   MRN:  [id number]    OT orders received and acknowledged. Patient not seen today secondary to remains intubated and sedated. Will follow-up once medically appropriate.    Tiffanie Isabel OT  8/5/2020

## 2020-08-05 NOTE — ASSESSMENT & PLAN NOTE
" 68y/o M pt with PMHx of etoh abuse, HTN who initially presented to OSH for 2 day hx of "shaking", tested positive for COVID-19 and was admitted for viral pneumonia, transferred to [facility] [place]4 for EEG as part of acute encephalopathy workup. Hospital course complicated by NSTEMI and high suspicion for PE for which he was started on ACS/PE protocol, respiratory failure and AMS requiring intubation and mechanical ventilation. Further workup revealed acute stroke with MRI findings concerning for cardioembolic etiology: "Scattered areas of acute infarction supratentorial and infratentorially in the anterior and posterior circulation." ID consulted for MSSA bacteremia.  --MSSA bacteremia possibly hospital acquired in the setting of superimposed bacterial pneumonia  --however, need to rule out infective endocarditis in the setting of likely cardioembolic stroke.  --BCx 8/2, MSSA  --BCx 8/3 s.aureus; susceptibilities pending  --BCx 8/5 NGTD  --RCx 8/2 MSSA and e.coli    --TTE 8/3 without evidence of HF or vegetations    Recommendations:  --stop vanc and ceftriaxone  --start oxacillin   --recommend SYLVIA to r/o infective endocarditis    "

## 2020-08-05 NOTE — CONSULTS
[facility] - ICU 16 WT  Vascular Neurology  Comprehensive Stroke Center  Consult Note    Inpatient consult to Vascular (Stroke) Neurology  Consult performed by: Margaret Smith DNP, NP  Consult ordered by: Danny Alvarado NP  Reason for consult: transfer, right lacunar stroke        Assessment/Plan:     Lacunar stroke  Atul Mendoza is a 69 y.o. male with a significant medical history of HTN, ETOH abuse, now COVID-19 + who presents to the hospital as a transfer from [facility] for evaluation of altered mental status.  The patient is currently intubated, sedated, on levophed and argatroban.  A CTH was obtained on 8/2/2020 with findings concerning for new right lacunar stroke.  The patient remained without significant movement in his extremities despite noxious stimuli and sedation being turned off.  He was transferred for higher level of care.    Antithrombotics for secondary stroke prevention: Anticoagulants: Agatroban    Statins for secondary stroke prevention and hyperlipidemia, if present:   Statins: None: Reason: LDL 38.6.  Elevated LFTs.    Aggressive risk factor modification: HTN, ETOH abuse, COVID-19 infection     Rehab efforts: The patient has been evaluated by a stroke team provider and the therapy needs have been fully considered based off the presenting complaints and exam findings. The following therapy evaluations are needed: PT/OT/SLP evaluations once the patient is able to participate.    Diagnostics ordered/pending: MRA head to assess vasculature, MRA neck/arch to assess vasculature, MRI head without contrast to assess brain parenchyma    VTE prophylaxis: Mechanical prophylaxis: Place SCDs  None: Reason for No Pharmacological VTE Prophylaxis: Currently on anticoagulation    BP parameters: Infarct: Avoid hypotension (SBP>100).        Hypotension due to hypovolemia  -Stroke risk factor.  Patient is currently on Levophed.  -Avoid hypotension, SBP>100  -Managed by  primary team    Hyperglycemia  -Stroke risk factor.  Patient w/o history of DM II.    -Recent glucose of 220  -Recommend tight glucose control, glucose 140-180    ST elevation myocardial infarction (STEMI)  -Stroke risk factor.  Likely 2/2 hypercoagulability due to COVID-19 infection.     COVID-19 virus infection  -Stroke risk factor.  Managed by primary team.        STROKE DOCUMENTATION          NIH Scale:  Interval: baseline  1a. Level of Consciousness: 3-->Responds only with reflex motor or autonomic effects or totally unresponsive, flaccid, and areflexic  1b. LOC Questions: 2-->Answers neither question correctly  1c. LOC Commands: 2-->Performs neither task correctly  2. Best Gaze: 1-->Partial gaze palsy, gaze is abnormal in one or both eyes, but forced deviation or total gaze paresis is not present  3. Visual: 0-->No visual loss  4. Facial Palsy: 0-->Normal symmetrical movements  5a. Motor Arm, Left: 3-->No effort against gravity, limb falls  5b. Motor Arm, Right: 3-->No effort against gravity, limb falls  6a. Motor Leg, Left: 4-->No movement  6b. Motor Leg, Right: 4-->No movement  7. Limb Ataxia: 0-->Absent  8. Sensory: 0-->Normal, no sensory loss  9. Best Language: 3-->Mute, global aphasia, no usable speech or auditory comprehension  10. Dysarthria: (UN) Intubated or other physical barrier  11. Extinction and Inattention (formerly Neglect): 1-->Visual, tactile, auditory, spatial, or personal inattention or extinction to bilateral simultaneous stimulation in one of the sensory modalities  Total (NIH Stroke Scale): 26    Modified DuPage Score: 1  [place] Coma Scale:6   ABCD2 Score:    [id number] Score:   HAS -BLED Score:   ICH Score:   Hunt & Gutierrez Classification:       Thrombolysis Candidate? No, Out of window     Delays to Thrombolysis?  No    Interventional Revascularization Candidate?   Is the patient eligible for mechanical endovascular reperfusion (DARIO)?  No; No large vessel occlusion      Hemorrhagic  change of an Ischemic Stroke: Does this patient have an ischemic stroke with hemorrhagic changes? No     Subjective:     History of Present Illness:  Atul Mendoza is a 69 y.o. male with a significant medical history of HTN, ETOH abuse, now COVID-19 + who presents to the hospital as a transfer from [facility] for evaluation of altered mental status.  HPI information gathered from review of the patient's medical record due to the patient being currently intubated and sedated.  For full hospital course at the OSH see the discharge summary.  Briefly, the patient presented to the OSH on 7/19/2020 and was admitted for ETOH withdrawal.  A COVID-19 test was obtained for admission and was positive.  The patient was asymptomatic and admitted to the [facility] Medicine service.  On 7/26 the patient began to desat to the 80s on RA and was placed on 3L NC bringing his O2 sat to the low mid 90s.  On 8/2 the patient became tachycardic, hypoxic, with elevated troponin and EKG changes.  He was upgraded to the ICU.  At that time he was on BiPap and noted to have jerking movements, altered mental status.   He was intubated and started on Levophed and propofol.  A CTH was obtained and revealed a 9 mm hypodensity in the right lentiform nucleus not revealed in the previously obtained MRI (motion limited) which was concerning for new lacunar stroke.  8/3/2020 the patient had thrombocytopenia, concern for HIT.  He was transfused 1u platelets and started on Argatroban.  On 8/4/2020 the patient's sedation was held and he was noted to have minimal movement with noxious stimuli which was concerning for non-convulsive status epilepticus.  The patient was transferred to [facility] for higher level of care.  Currently the patient is intubated and on propofol, levophed, and argatroban.  No spontaneous movement of extremities noted.  On exam the patient withdraws from pain in the BUE.  No movement noted in the  lower extremities.  The patient is currently admitted to [facility] on the 16th floor COVID unit.           Past Medical History:   Diagnosis Date    Hypertension      Past Surgical History:   Procedure Laterality Date    CATARACT EXTRACTION W/  INTRAOCULAR LENS IMPLANT Right 12/12/2019    Procedure: EXTRACTION, CATARACT, WITH IOL INSERTION;  Surgeon: Alcides Holcomb MD;  Location: [facility];  Service: Ophthalmology;  Laterality: Right;    PHACOEMULSIFICATION OF CATARACT Right 12/12/2019    Procedure: PHACOEMULSIFICATION, CATARACT;  Surgeon: Alcides Holcomb MD;  Location: Psychiatric;  Service: Ophthalmology;  Laterality: Right;     Family History   Problem Relation Age of Onset    Heart attack Father      Social History     Tobacco Use    Smoking status: Never Smoker    Smokeless tobacco: Never Used   Substance Use Topics    Alcohol use: Yes     Comment: social    Drug use: Never     Review of patient's allergies indicates:  No Known Allergies    Medications: I have reviewed the current medication administration record.    Medications Prior to Admission   Medication Sig Dispense Refill Last Dose    amlodipine-benazepril 10-20mg (LOTREL) 10-20 mg per capsule Take 1 capsule by mouth once daily.       CIPROFLOXACIN HCL OPHT Apply to eye 4 (four) times daily.       difluprednate (DUREZOL) 0.05 % Drop ophthalmic solution 1 drop.       hydrOXYzine pamoate (VISTARIL) 25 MG Cap Take 1 capsule (25 mg total) by mouth every 8 (eight) hours as needed. 10 capsule 0     nepafenac (ILEVRO) 0.3 % DrpS Apply to eye every evening.          Review of Systems   Unable to perform ROS: Intubated   Constitutional: Positive for fever.   HENT: Negative for drooling.    Eyes: Negative for discharge and redness.   Respiratory: Positive for shortness of breath.    Cardiovascular: Negative for leg swelling.   Gastrointestinal: Negative for diarrhea and vomiting.   Genitourinary: Negative for hematuria.   Musculoskeletal: Negative for neck  stiffness.   Skin: Positive for color change. Negative for rash.   Neurological: Positive for tremors, speech difficulty and weakness.   Psychiatric/Behavioral: Positive for agitation and confusion.     Objective:     Vital Signs (Most Recent):  Temp: 99.4 °F (37.4 °C) (08/05/20 0000)  Pulse: 91 (08/05/20 0200)  Resp: (!) 22 (08/05/20 0044)  BP: 109/65 (08/05/20 0000)  SpO2: 100 % (08/05/20 0200)    Vital Signs Range (Last 24H):  Temp:  [98.4 °F (36.9 °C)-99.9 °F (37.7 °C)]   Pulse:  []   Resp:  [13-37]   BP: ()/(53-75)   SpO2:  [85 %-100 %]   Arterial Line BP: (107-140)/(49-57)     Physical Exam  Vitals signs and nursing note reviewed.   Constitutional:       Appearance: He is ill-appearing.      Interventions: He is intubated.   HENT:      Head: Normocephalic and atraumatic.      Right Ear: External ear normal.      Left Ear: External ear normal.      Nose: Nose normal.      Mouth/Throat:      Mouth: Mucous membranes are moist.   Eyes:      General:         Right eye: No discharge.         Left eye: No discharge.      Conjunctiva/sclera: Conjunctivae normal.      Pupils: Pupils are equal, round, and reactive to light.   Neck:      Musculoskeletal: Neck supple.   Cardiovascular:      Rate and Rhythm: Normal rate.   Pulmonary:      Effort: He is intubated.   Abdominal:      General: There is no distension.      Palpations: Abdomen is soft.   Musculoskeletal:         General: Swelling (BUE) present.   Skin:     General: Skin is warm and dry.      Findings: Bruising present.         Neurological Exam:   LOC: obtunded  Articulation: Untestable due to intubation      Laboratory:  CMP:   Recent Labs   Lab 08/04/20  2236   CALCIUM 6.9*   ALBUMIN 1.3*   PROT 5.2*   *   K 3.8   CO2 22*      BUN 97*   CREATININE 4.3*   ALKPHOS 58   *   *   BILITOT 0.9     CBC:   Recent Labs   Lab 08/04/20 2236   WBC 13.23*   RBC 3.51*   HGB 11.7*   HCT 33.9*   PLT 79*   MCV 97   MCH 33.3*   MCHC 34.5      Lipid Panel:   Recent Labs   Lab 08/05/20  0005   CHOL 71*   LDLCALC 38.6*   HDL 7*   TRIG 127     Coagulation:   Recent Labs   Lab 08/04/20  2236 08/05/20  0223   INR 2.7*  --    APTT 58.3* 39.6*     Hgb A1C:   Recent Labs   Lab 08/05/20  0005   HGBA1C 6.0*     TSH:   Recent Labs   Lab 08/05/20  0005   TSH 2.230       Diagnostic Results:      Brain imaging:  [facility] 08/02/2020 Study is significantly degraded by motion artifact limiting evaluation.  9 mm hypodensity right lentiform nucleus not definitively seen on the prior MRI or CT raising question recent lacunar infarct.  Less than 5 mm hypodensity deep white matter adjacent to midportion right lateral ventricle noted not definitively seen on the prior CT or MRI and as such may reflect recent small deep white matter infarct.  Follow-up MRI can be obtained to better assess these areas.  Mild atrophy and mild presumed microvascular ischemic changes cerebral white matter including what appears to represent 5 mm lateral left parietooccipital subcortical infarct unchanged.    MRI Brain pending      Vessel Imaging:  MRA Brain and Neck pending    Cardiac Evaluation:   TTE 08/03/2020    Conclusion  · Technically challanging. Very limited views and very poor image quality.  · Normal left ventricular systolic function. The estimated ejection fraction appears to be preserved, about 55%.  · No obvious wall motion abnormalities.  · Indeterminate left ventricular diastolic function.  · Concentric left ventricular remodeling.  · Normal right ventricular systolic function.  · The tricuspid regurgitation (TR) jet is insufficient to calculate the pulmonary artery pressure.  · No obvious significant valvular abnormalities are observed, though, valves are very poorly visualized in this very limited echo.  · Mechanically ventilated; cannot use inferior caval vein diameter to estimate central venous pressure.           Margaret Smith, ERICH, NP  [facility]  Department  of Vascular Neurology   [facility] - ICU 16 WT

## 2020-08-05 NOTE — CONSULTS
[facility] - ICU 16 WT  Adult Nutrition  Consult Note    SUMMARY     Recommendations    1.) Begin enteral nutrition within 48hr: Peptamen Intense VHP @ 30mL/hr increasing 10mL q4h to goal rate 50mL/hr to provide 1200 kcals, 110gm protein and 1008mL of free water.    MD to manage fluid.    Hold if GRV >500mL.    TF + current propofol rate to meet 71% EEN and 100% EPN.   2.) Add COVID19+ supplements: vitamin C, vitamin D, zinc   Also add: MVI, folic acid, thiamine.   3.) Daily weights.     Goals:   1.) Pt to return/meet >75% EEN and EPN by follow up.   Nutrition Goal Status: new  Communication of RD Recs: other (comment)(POC)    Reason for Assessment    Reason For Assessment: consult  Diagnosis: other (see comments)(acute encephalopathy)  Relevant Medical History: HTN, ETOH abuse, COVID19  Interdisciplinary Rounds: did not attend  General Information Comments: Pt transferred from another facility, currently intubated, sedated with propofol, with no TF infusing. Per chart, pt has been with poor PO intake >7 days. No GI distress. Wt hx per chart review: UBW 78kg (7/15/2020), admit wt 76.5kg. NFPE to be completed at later date given COVID19+. +2 edema noted to BUE. At this time, pt at high risk for malnutrition given poor PO intakes and possible edema.   Nutrition Discharge Planning: Unable to be determined at this time.     Nutrition Risk Screen    Nutrition Risk Screen: tube feeding or parenteral nutrition    Nutrition/Diet History    Food Allergies: NKFA  Factors Affecting Nutritional Intake: NPO, on mechanical ventilation    Anthropometrics    Temp: 98.1 °F (36.7 °C)  Height Method: Estimated  Height: 6' (182.9 cm)  Height (inches): 72 in  Weight Method: Bed Scale  Weight: 76.5 kg (168 lb 10.4 oz)  Weight (lb): 168.65 lb  Ideal Body Weight (IBW), Male: 178 lb  % Ideal Body Weight, Male (lb): 94.75 %  BMI (Calculated): 22.9  BMI Grade: 18.5-24.9 - normal     Lab/Procedures/Meds    Pertinent Labs  Reviewed: reviewed  Pertinent Labs Comments: Na 134, CO2 22, BUN 99, Cr 4.0, GFR 16.8, Glu 263, Ca 6.9, Phos 4.9, albumin 1.4, ,   Pertinent Medications Reviewed: reviewed  Pertinent Medications Comments: nebulizer, statin, famotidine, lactulose, zosyn, pressor x1, propofol    Estimated/Assessed Needs    Weight Used For Calorie Calculations: 76.5 kg (168 lb 10.4 oz)  Energy Calorie Requirements (kcal): 1937  Energy Need Method: [facility]  Protein Requirements: (g/day)  Weight Used For Protein Calculations: 76.5 kg (168 lb 10.4 oz)(1.2-1.5 g/kg)     Estimated Fluid Requirement Method: RDA Method(or per MD)  RDA Method (mL): 1937     Nutrition Prescription Ordered    Current Diet Order: NPO    Evaluation of Received Nutrient/Fluid Intake    Other Calories (kcal): 182(propofol @ 6.9mL/hr)  IV Fluid (mL): 1800  I/O: +0.3L since admit  Energy Calories Required: not meeting needs  Protein Required: not meeting needs  Fluid Required: not meeting needs  Comments: LBM 8/5  % Intake of Estimated Energy Needs: 0 - 25 %  % Meal Intake: NPO    Nutrition Risk    Level of Risk/Frequency of Follow-up: high(x2/week)     Assessment and Plan  Nutrition Problem  Inadequate energy intake     Related to (etiology):   Decreased ability to consume sufficient energy    Signs and Symptoms (as evidenced by):   NPO, mechanical ventilation; <75% of nutritional needs being met    Interventions(treatment strategy):  Collaboration of care with other providers  Referral of care    Nutrition Diagnosis Status:   New    Monitor and Evaluation    Food and Nutrient Intake: enteral nutrition intake  Food and Nutrient Adminstration: enteral and parenteral nutrition administration  Anthropometric Measurements: weight, weight change, body mass index  Biochemical Data, Medical Tests and Procedures: electrolyte and renal panel, gastrointestinal profile, glucose/endocrine profile, inflammatory profile, lipid profile  Nutrition-Focused  Physical Findings: overall appearance     Malnutrition Assessment    Weight Loss (Malnutrition): other (see comments)(DIVYA)  Energy Intake (Malnutrition): less than 75% for greater than 7 days   Edema (Fluid Accumulation): 2-->mild(BUE)     NFPE not performed, patient has been screened for possible COVID-19 and has been placed on airborne and contact precautions.  Patient is noted as being positive for COVID-19.    Nutrition Follow-Up    RD Follow-up?: Yes

## 2020-08-05 NOTE — HOSPITAL COURSE
8/5/2020 [place] [facility] under [facility] for eval of encephalopathy, r/o status  8/6/2020: renal function improving, although HIT antibody negative, high suspicion for  HIT remains, serotonin assay sent, white count normalizing on Abx, off pressors >24 hrs, wean sedation  8/7/20: renal function gradually improving, serotonin assay pending, plt count slightly improved, afebrile, nl white count, placed on psv- possible extubation today  8/8/20: CVP increased and having more difficulties oxygenating, start diuresis with 60mg iv lasix, 40mEQ potassium po(follow K closely while being diuresed, white count and temp nl, has not sufficiently awakened off sedation since yesterday- repeat head ct  8/9/20: neuro exam mostly unchanged from yesterday, off sedation, rpeat head ct unremarkable, awaiting EEG result, may need MRI, rpeat SBT today  08/10/2020 exam unchanged, remains off of sedation, EEG unremarkable

## 2020-08-05 NOTE — ASSESSMENT & PLAN NOTE
STEMI on 8/2, Troponin as high as 9.56  Will trend troponin while on levophed as it appears they were still elevated prior to transfer  Consider cardiology consult of do not decline, or levophed needs increase  Echo on 8/3, reported difficult to image, but EF 55%, no comments of wall motion abnormality

## 2020-08-06 NOTE — SUBJECTIVE & OBJECTIVE
Interval History:   Renal function with signs of improvement over the last 24 hrs. sCr now 2.9 from 4.0. BUN 84 from 99. UOP 1625 mL/24 hrs. Patient started on NS at 75 mL/hr.   He remains net positive 1.7 L/24 hrs.   On Argatabran.   Remains intubated, FiO2 35%.     Review of patient's allergies indicates:  No Known Allergies  Current Facility-Administered Medications   Medication Frequency    0.9%  NaCl infusion Continuous    acetaminophen tablet 650 mg Q6H PRN    albuterol-ipratropium 2.5 mg-0.5 mg/3 mL nebulizer solution 3 mL Q6H    argatroban in sodium chloride 0.9% (conc: 1 mg/mL) Continuous    atorvastatin tablet 40 mg Daily    ciprofloxacin HCl tablet 500 mg Q24H    dextrose 10 % infusion Continuous PRN    dextrose 50% injection 12.5 g PRN    famotidine tablet 20 mg Daily    glucagon (human recombinant) injection 1 mg PRN    hydrocortisone sodium succinate injection 50 mg Q8H    insulin aspart U-100 pen 1-10 Units Q4H PRN    lactulose 20 gram/30 mL solution Soln 15 g TID    norepinephrine 4 mg in dextrose 5% 250 mL infusion (premix) (titrating) Continuous    ondansetron injection 4 mg Q8H PRN    oxacillin 12 g in  mL CONTINUOUS INFUSION Q24H    propofol (DIPRIVAN) 10 mg/mL infusion Continuous    sodium chloride 0.9% flush 10 mL PRN       Objective:     Vital Signs (Most Recent):  Temp: 97.1 °F (36.2 °C) (08/06/20 0800)  Pulse: 70 (08/06/20 1000)  Resp: 18 (08/06/20 1000)  BP: (!) 146/83 (08/06/20 1000)  SpO2: (!) 94 % (08/06/20 1000)  O2 Device (Oxygen Therapy): ventilator (08/06/20 1000) Vital Signs (24h Range):  Temp:  [96.2 °F (35.7 °C)-98 °F (36.7 °C)] 97.1 °F (36.2 °C)  Pulse:  [63-87] 70  Resp:  [17-24] 18  SpO2:  [94 %-100 %] 94 %  BP: (110-146)/(64-83) 146/83  Arterial Line BP: (119-149)/(55-76) 148/70     Weight: 76.2 kg (168 lb) (08/06/20 0856)  Body mass index is 22.78 kg/m².  Body surface area is 1.97 meters squared.    I/O last 3 completed shifts:  In: 4219.2  [I.V.:2602.2; NG/GT:995; IV Piggyback:622]  Out: 2150 [Urine:1975; Stool:175]    Physical Exam  Vitals signs and nursing note reviewed.   Constitutional:       General: He is not in acute distress.     Appearance: He is ill-appearing.      Interventions: He is sedated and intubated.      Comments: A full-contact physical exam was not possible due to the clinical condition of the patient due to covid 19 infection and the necessity to minimize exposure.     Physical exam of the primary team reviewed.    HENT:      Head: Normocephalic and atraumatic.   Cardiovascular:      Rate and Rhythm: Normal rate.   Pulmonary:      Effort: Pulmonary effort is normal. No respiratory distress. He is intubated.         Significant Labs:  CBC:   Recent Labs   Lab 08/06/20  0517   WBC 6.95   RBC 3.18*   HGB 10.5*   HCT 31.2*   PLT 33*   MCV 98   MCH 33.0*   MCHC 33.7     CMP:   Recent Labs   Lab 08/06/20  0517 08/06/20  0842   * 259*   CALCIUM 6.6* 6.6*   ALBUMIN 1.2* 1.2*   PROT 5.0*  --     136   K 3.4* 3.5   CO2 23 21*    108   BUN 84* 82*   CREATININE 2.9* 2.8*   ALKPHOS 51*  --    ALT 98*  --    AST 72*  --    BILITOT 0.6  --      All labs within the past 24 hours have been reviewed.

## 2020-08-06 NOTE — ASSESSMENT & PLAN NOTE
The patient is a 69 y.o. male with a significant medical history of HTN, ETOH abuse, now COVID-19 + who presents to the hospital as a transfer from [facility] for evaluation of altered mental status.  The patient decompensated at the OSH requiring intubation and pressor support after developing acute tachycardia and hypoxia thought to be due to PE and/or NSTEMI. the patient had a CTH which revealed findings for a new right lacunar stroke prompting [place] [facility]     The patient was admitted to the OSH with serum creatinine of 2.0 which fluctuated throughout his hospital stay. His renal function took a hit after he decompensated on 8/2. He arrived with a sCr of 4.0 on 8/4 which is essentially stable. His sCr has improved to 3.6 today. UOP remains marginal at this time. Volume status and respiratory status appears to be stable. Other electrolytes stable.     HINA 2/2 iATN due to hemodynamic instabilities in the setting of COVID-19 +/- acute NSTEMI/PE (likely 2/2 hypercoagulability due to COVID-19 infection).     UA: 0 protein, 3+ occult blood, 1+ glucose, 3+ leukocytes, rbc 32, wbc 47    Assessment:  -Still no urgent need for dialysis at this time. Renal function improving. sCr 2.9 from 4.0.  -UOP improving (1625 mL/24 hrs); consider diuretics for volume management if indicated.   -Agree with NS at 75 mL/hr for now. Vent settings stable.   -Frequent renal function panels, q6-8 hrs daily   -Strict I/Os and daily weights  -Avoid hypotension which may worsen HINA  -Continue on mechanical ventilation with 35% FiO2  -Continue to monitor intake and output, daily weights   -Avoid nephrotoxic medication and renal dose medications to GFR  -Will discuss with staff

## 2020-08-06 NOTE — SUBJECTIVE & OBJECTIVE
Interval History: No acute events overnight. Repeat TTE without evidence of vegetations. Remains intubated and sedated.    Review of Systems   Unable to perform ROS: Intubated     Objective:     Vital Signs (Most Recent):  Temp: 98.1 °F (36.7 °C) (08/06/20 1600)  Pulse: 77 (08/06/20 1600)  Resp: 16 (08/06/20 1600)  BP: 119/80 (08/06/20 1600)  SpO2: 95 % (08/06/20 1600) Vital Signs (24h Range):  Temp:  [96.9 °F (36.1 °C)-98.1 °F (36.7 °C)] 98.1 °F (36.7 °C)  Pulse:  [63-85] 77  Resp:  [14-24] 16  SpO2:  [94 %-100 %] 95 %  BP: (110-148)/(69-87) 119/80  Arterial Line BP: (119-163)/(57-84) 150/68     Weight: 76.2 kg (168 lb)  Body mass index is 22.78 kg/m².    Estimated Creatinine Clearance: 27.6 mL/min (A) (based on SCr of 2.8 mg/dL (H)).    Physical Exam  Vitals signs and nursing note reviewed.     Exam deferred due to COVID-19 and risk for transmission.    Significant Labs: All pertinent labs within the past 24 hours have been reviewed.    Significant Imaging: I have reviewed all pertinent imaging results/findings within the past 24 hours.

## 2020-08-06 NOTE — SUBJECTIVE & OBJECTIVE
Review of Systems   Unable to perform ROS: Intubated     Objective:     Vitals:  Temp: 97.5 °F (36.4 °C)  Pulse: 67  Rhythm: normal sinus rhythm  BP: (!) 148/83  MAP (mmHg): 109  CVP (mean): 5 mmHg  Resp: 16  SpO2: 98 %  Oxygen Concentration (%): 35  O2 Device (Oxygen Therapy): ventilator  Vent Mode: A/C  Set Rate: 12 BPM  Vt Set: 550 mL  PEEP/CPAP: 5 cmH20  Peak Airway Pressure: 23 cmH2O  Mean Airway Pressure: 9.1 cmH20    Temp  Min: 96.9 °F (36.1 °C)  Max: 98 °F (36.7 °C)  Pulse  Min: 63  Max: 85  BP  Min: 110/69  Max: 148/83  MAP (mmHg)  Min: 84  Max: 109  CVP (mean)  Min: 0 mmHg  Max: 5 mmHg  Resp  Min: 16  Max: 24  SpO2  Min: 94 %  Max: 100 %  Oxygen Concentration (%)  Min: 35  Max: 35    08/05 0701 - 08/06 0700  In: 3524.6 [I.V.:2007.6]  Out: 1800 [Urine:1625]   Unmeasured Output  Urine Occurrence: 1  Stool Occurrence: 1  Emesis Occurrence: 0  Pad Count: 2       Physical Exam  Constitutional:       General: He is not in acute distress.  HENT:      Head: Normocephalic.   Eyes:      Pupils: Pupils are equal, round, and reactive to light.   Neck:      Musculoskeletal: Neck supple.   Cardiovascular:      Rate and Rhythm: Regular rhythm.      Pulses: Normal pulses.   Pulmonary:      Breath sounds: Normal breath sounds.   Abdominal:      General: Abdomen is flat.      Palpations: Abdomen is soft.   Musculoskeletal:         General: Swelling present.   Skin:     General: Skin is warm.      Capillary Refill: Capillary refill takes less than 2 seconds.   Neurological:      Comments: Deep coma on sedation, EOMs also affected  Slight flexion withdrawal on left only response I can illicit           Medications:  Continuoussodium chloride 0.9%, Last Rate: 75 mL/hr at 08/05/20 0900  argatroban in 0.9 % sod chlor, Last Rate: 0.469 mcg/kg/min (08/06/20 1333)  dextrose 10 % in water (D10W)  norepinephrine bitartrate-D5W, Last Rate: Stopped (08/05/20 0700)  propofoL, Last Rate: 5 mcg/kg/min (08/06/20  1307)    Scheduledalbuterol-ipratropium, 3 mL, Q6H  atorvastatin, 40 mg, Daily  ciprofloxacin HCl, 500 mg, Q24H  famotidine, 20 mg, Daily  hydrocortisone sodium succinate, 50 mg, Q8H  lactulose, 15 g, TID  oxacillin 12 g in  mL CONTINUOUS INFUSION, 12 g, Q24H    PRNsodium chloride, , Q24H PRN  acetaminophen, 650 mg, Q6H PRN  dextrose 10 % in water (D10W), , Continuous PRN  dextrose 50%, 12.5 g, PRN  glucagon (human recombinant), 1 mg, PRN  insulin aspart U-100, 1-10 Units, Q4H PRN  ondansetron, 4 mg, Q8H PRN  sodium chloride 0.9%, 10 mL, PRN      Today I personally reviewed pertinent medications, lines/drains/airways, imaging, cardiology results, laboratory results, microbiology results, notably:    Diet  Diet NPO  Diet NPO

## 2020-08-06 NOTE — PLAN OF CARE
Problem: Adult Inpatient Plan of Care  Goal: Plan of Care Review  Outcome: Ongoing, Progressing  Goal: Patient-Specific Goal (Individualization)  Outcome: Ongoing, Progressing  Goal: Absence of Hospital-Acquired Illness or Injury  Outcome: Ongoing, Progressing  Goal: Optimal Comfort and Wellbeing  Outcome: Ongoing, Progressing  Goal: Readiness for Transition of Care  Outcome: Ongoing, Progressing  Goal: Rounds/Family Conference  Outcome: Ongoing, Progressing     Problem: Electrolyte Imbalance (Acute Kidney Injury/Impairment)  Goal: Serum Electrolyte Balance  Outcome: Ongoing, Progressing     Problem: Fluid Imbalance (Acute Kidney Injury/Impairment)  Goal: Optimal Fluid Balance  Outcome: Ongoing, Progressing     Problem: Hematologic Alteration (Acute Kidney Injury/Impairment)  Goal: Hemoglobin, Hematocrit and Platelets Within Normal Range  Outcome: Ongoing, Progressing     Problem: Oral Intake Inadequate (Acute Kidney Injury/Impairment)  Goal: Optimal Nutrition Intake  Outcome: Ongoing, Progressing     Problem: Renal Function Impairment (Acute Kidney Injury/Impairment)  Goal: Effective Renal Function  Outcome: Ongoing, Progressing     Problem: Infection  Goal: Infection Symptom Resolution  Outcome: Ongoing, Progressing     Problem: Wound  Goal: Optimal Wound Healing  Outcome: Ongoing, Progressing     Problem: Fall Injury Risk  Goal: Absence of Fall and Fall-Related Injury  Outcome: Ongoing, Progressing     Problem: Restraint, Nonbehavioral (Nonviolent)  Goal: Discontinuation Criteria Achieved  Outcome: Ongoing, Progressing  Goal: Personal Dignity and Safety Maintained  Outcome: Ongoing, Progressing     Problem: Adjustment to Illness (Stroke, Ischemic/Transient Ischemic Attack)  Goal: Optimal Coping  Outcome: Ongoing, Progressing     Problem: Bowel Elimination Impaired (Stroke, Ischemic/Transient Ischemic Attack)  Goal: Effective Bowel Elimination  Outcome: Ongoing, Progressing     Problem: Cerebral Tissue Perfusion  Risk (Stroke, Ischemic/Transient Ischemic Attack)  Goal: Optimal Cerebral Tissue Perfusion  Outcome: Ongoing, Progressing     Problem: Communication Impairment (Stroke, Ischemic/Transient Ischemic Attack)  Goal: Improved Communication Skills  Outcome: Ongoing, Progressing     Problem: Eating/Swallowing Impairment (Stroke, Ischemic/Transient Ischemic Attack)  Goal: Oral Intake without Aspiration  Outcome: Ongoing, Progressing     Problem: Functional Ability Impaired (Stroke, Ischemic/Transient Ischemic Attack)  Goal: Optimal Functional Ability  Outcome: Ongoing, Progressing     Problem: Hemodynamic Instability (Stroke, Ischemic/Transient Ischemic Attack)  Goal: Vital Signs Remain in Desired Range  Outcome: Ongoing, Progressing     Problem: Pain (Stroke, Ischemic/Transient Ischemic Attack)  Goal: Acceptable Pain Control  Outcome: Ongoing, Progressing     Problem: Sensorimotor Impairment (Stroke, Ischemic/Transient Ischemic Attack)  Goal: Improved Sensorimotor Function  Outcome: Ongoing, Progressing     Problem: Urinary Elimination Impaired (Stroke, Ischemic/Transient Ischemic Attack)  Goal: Effective Urinary Elimination  Outcome: Ongoing, Progressing     Problem: Communication Impairment (Mechanical Ventilation, Invasive)  Goal: Effective Communication  Outcome: Ongoing, Progressing     Problem: Device-Related Complication Risk (Mechanical Ventilation, Invasive)  Goal: Optimal Device Function  Outcome: Ongoing, Progressing     Problem: Inability to Wean (Mechanical Ventilation, Invasive)  Goal: Mechanical Ventilation Liberation  Outcome: Ongoing, Progressing     Problem: Nutrition Impairment (Mechanical Ventilation, Invasive)  Goal: Optimal Nutrition Delivery  Outcome: Ongoing, Progressing     Problem: Skin and Tissue Injury (Mechanical Ventilation, Invasive)  Goal: Absence of Device-Related Skin and Tissue Injury  Outcome: Ongoing, Progressing     Problem: Ventilator-Induced Lung Injury (Mechanical Ventilation,  Invasive)  Goal: Absence of Ventilator-Induced Lung Injury  Outcome: Ongoing, Progressing     Problem: Communication Impairment (Artificial Airway)  Goal: Effective Communication  Outcome: Ongoing, Progressing     Problem: Device-Related Complication Risk (Artificial Airway)  Goal: Optimal Device Function  Outcome: Ongoing, Progressing     Problem: Skin and Tissue Injury (Artificial Airway)  Goal: Absence of Device-Related Skin or Tissue Injury  Outcome: Ongoing, Progressing     Problem: Noninvasive Ventilation Acute  Goal: Effective Unassisted Ventilation and Oxygenation  Outcome: Ongoing, Progressing     Problem: Adjustment to Illness (Sepsis/Septic Shock)  Goal: Optimal Coping  Outcome: Ongoing, Progressing     Problem: Bleeding (Sepsis/Septic Shock)  Goal: Absence of Bleeding  Outcome: Ongoing, Progressing     Problem: Glycemic Control Impaired (Sepsis/Septic Shock)  Goal: Blood Glucose Level Within Desired Range  Outcome: Ongoing, Progressing     Problem: Hemodynamic Instability (Sepsis/Septic Shock)  Goal: Effective Tissue Perfusion  Outcome: Ongoing, Progressing     Problem: Infection (Sepsis/Septic Shock)  Goal: Absence of Infection Signs/Symptoms  Outcome: Ongoing, Progressing     Problem: Nutrition Impaired (Sepsis/Septic Shock)  Goal: Optimal Nutrition Intake  Outcome: Ongoing, Progressing     Problem: Respiratory Compromise (Sepsis/Septic Shock)  Goal: Effective Oxygenation and Ventilation  Outcome: Ongoing, Progressing

## 2020-08-06 NOTE — ASSESSMENT & PLAN NOTE
vanc d/c'd, placed on continuous oxacillin and cipro  Follow up repeat blood cultures, TTE ordered

## 2020-08-06 NOTE — ASSESSMENT & PLAN NOTE
CRT 4.3, GFR 15.4  K < 5, no metabolic acidosis noted on ABG  Fio2 needs Are minimal, given COVID19   Follow I/O, making urine  Consider nephrology consult if acute changes  08/06: improving, making urine, cont ivfs

## 2020-08-06 NOTE — ASSESSMENT & PLAN NOTE
Etiology again remains unclear  Although suspicion of prothrombotic state related to HIT, would keep plt count >50K in presence of argatroban

## 2020-08-06 NOTE — ASSESSMENT & PLAN NOTE
Concerned for HIT, heparin switched to argatroban   08/06: continue argatroban until serotonin assay result returns

## 2020-08-06 NOTE — ASSESSMENT & PLAN NOTE
Multifactorial, main component for decline in LOC is non titrating propofol  Hold sedation, may restart titration to RASS -1 to -2

## 2020-08-06 NOTE — PROGRESS NOTES
"[facility] - ICU 16 WT  Neurocritical Care  Progress Note    Admit Date: 8/4/2020  Service Date: 08/06/2020  Length of Stay: 2    Subjective:     Chief Complaint: Acute encephalopathy    History of Present Illness:  69-year-old AA male with a PMHx of HTN and alcohol abuse who presents as transfer fro [facility]. He originally presented to the [facility] ED due shaking for 2 days. Patient was seen in the ED at [facility] for similar compliant and was discharged home on Hydroxyzine without improvement in his tremulousness. Patient reported occasional alcohol drinking, however when asked to quantify, he reported drinking at least a case of 12-bottles of beer daily for over 20 year. Unable to confirm or deny any history of withdrawal. When asked why he's in the hospital, he stated "I need to be fixed up" but did not report any specific complaint. Unable to answer living situation. Denied any F/C, HA, visual changes, rhinitis, sputum production, anosmia, ageusia, CP, SOB, N/V, abdominal pain, bleeding (hemoptysis / hematemesis, hematuria, BRBPR), C/D, or changes in urinary habits. Denied any sick contacts or recent travel. Patient stated that he does not want to drink alcohol anymore. He was admitted there and durign work-up was covid positive. Overnight he became Tachycardic (150s) with hypoxia (~80% SpO2) on NRB with Tachypnea.  Elevated D-Dimer and Troponins with EKG changes. Placed on ACS/VTE tx for PE vs MI. BIPAP palced with improvement in hypoxia 90% SpO2. Patient placed in ICU for closer monitoring and respiratory support with concerns for pending respiratory failure in the setting of PE and NSTEMI.    In the Am, he appeared more altered, and was intubated d/t concerns of resp. Failure and encephalopathy. He was started on pressors for hypotension. During this time, body jerking was noted, which prompted a CTH which showed new infarcts. He was started on Keppra and propofol, and [place] [facility] [place]. There was alos concerns for sepsis,  So ABX started.                Hospital Course: 8/5/2020 Arrived [facility], under NCC for eval of encephalopathy, r/o status  8/6/2020: renal function improving, although HIT antibody negative, high suspicion for  HIT remains, serotonin assay sent, white count normalizing on Abx, off pressors >24 hrs, wean sedation          Review of Systems   Unable to perform ROS: Intubated     Objective:     Vitals:  Temp: 97.5 °F (36.4 °C)  Pulse: 67  Rhythm: normal sinus rhythm  BP: (!) 148/83  MAP (mmHg): 109  CVP (mean): 5 mmHg  Resp: 16  SpO2: 98 %  Oxygen Concentration (%): 35  O2 Device (Oxygen Therapy): ventilator  Vent Mode: A/C  Set Rate: 12 BPM  Vt Set: 550 mL  PEEP/CPAP: 5 cmH20  Peak Airway Pressure: 23 cmH2O  Mean Airway Pressure: 9.1 cmH20    Temp  Min: 96.9 °F (36.1 °C)  Max: 98 °F (36.7 °C)  Pulse  Min: 63  Max: 85  BP  Min: 110/69  Max: 148/83  MAP (mmHg)  Min: 84  Max: 109  CVP (mean)  Min: 0 mmHg  Max: 5 mmHg  Resp  Min: 16  Max: 24  SpO2  Min: 94 %  Max: 100 %  Oxygen Concentration (%)  Min: 35  Max: 35    08/05 0701 - 08/06 0700  In: 3524.6 [I.V.:2007.6]  Out: 1800 [Urine:1625]   Unmeasured Output  Urine Occurrence: 1  Stool Occurrence: 1  Emesis Occurrence: 0  Pad Count: 2       Physical Exam  Constitutional:       General: He is not in acute distress.  HENT:      Head: Normocephalic.   Eyes:      Pupils: Pupils are equal, round, and reactive to light.   Neck:      Musculoskeletal: Neck supple.   Cardiovascular:      Rate and Rhythm: Regular rhythm.      Pulses: Normal pulses.   Pulmonary:      Breath sounds: Normal breath sounds.   Abdominal:      General: Abdomen is flat.      Palpations: Abdomen is soft.   Musculoskeletal:         General: Swelling present.   Skin:     General: Skin is warm.      Capillary Refill: Capillary refill takes less than 2 seconds.   Neurological:      Comments: Deep coma on sedation, EOMs also affected  Slight flexion withdrawal  on left only response I can illicit           Medications:  Continuoussodium chloride 0.9%, Last Rate: 75 mL/hr at 08/05/20 0900  argatroban in 0.9 % sod chlor, Last Rate: 0.469 mcg/kg/min (08/06/20 1333)  dextrose 10 % in water (D10W)  norepinephrine bitartrate-D5W, Last Rate: Stopped (08/05/20 0700)  propofoL, Last Rate: 5 mcg/kg/min (08/06/20 1307)    Scheduledalbuterol-ipratropium, 3 mL, Q6H  atorvastatin, 40 mg, Daily  ciprofloxacin HCl, 500 mg, Q24H  famotidine, 20 mg, Daily  hydrocortisone sodium succinate, 50 mg, Q8H  lactulose, 15 g, TID  oxacillin 12 g in  mL CONTINUOUS INFUSION, 12 g, Q24H    PRNsodium chloride, , Q24H PRN  acetaminophen, 650 mg, Q6H PRN  dextrose 10 % in water (D10W), , Continuous PRN  dextrose 50%, 12.5 g, PRN  glucagon (human recombinant), 1 mg, PRN  insulin aspart U-100, 1-10 Units, Q4H PRN  ondansetron, 4 mg, Q8H PRN  sodium chloride 0.9%, 10 mL, PRN      Today I personally reviewed pertinent medications, lines/drains/airways, imaging, cardiology results, laboratory results, microbiology results, notably:    Diet  Diet NPO  Diet NPO        Assessment/Plan:     Neuro  Altered mental status  Multifactorial, main component for decline in LOC is non titrating propofol  Hold sedation, may restart titration to RASS -1 to -2    Pulmonary  Acute respiratory failure with hypoxia  emergently intubated after declined while on floor in OSH  COVID19 positive  CXR/ABG daily  Will assess and wean vent as able  Fio2 40, peep 5 at this time.  CXR impoving, can begin SBT when awakens       Renal/  HINA (acute kidney injury)  CRT 4.3, GFR 15.4  K < 5, no metabolic acidosis noted on ABG  Fio2 needs Are minimal, given COVID19   Follow I/O, making urine  Consider nephrology consult if acute changes  08/06: improving, making urine, cont ivfs    ID  Severe sepsis  Elevated lactic prior to arrival, will trend  Broad spec Abx continued form OSH  Pan Cx  Off Levophed for BP support  Appreciate ID  [email or web address]    Hematology  Thrombocytopenia  Etiology again remains unclear  Although suspicion of prothrombotic state related to HIT, would keep plt count >50K in presence of argatroban    HIT (heparin-induced thrombocytopenia)  Concerned for HIT, heparin switched to argatroban   08/06: continue argatroban until serotonin assay result returns    Endocrine  Hyperglycemia  ISS q 4hr  Possibly d/t steriods that were received at OSH , as HgbA1c is 5.1   Serum glucose increasing today,08/06 and is set to start peptamen intense TFs, start insulin gtt, will reassess when steroid course is completed in am    Other  MSSA bacteremia  vanc d/c'd, placed on continuous oxacillin and cipro  Follow up repeat blood cultures, TTE ordered          The patient is being Prophylaxed for:  Venous Thromboembolism with: Chemical  Stress Ulcer with: PPI  Ventilator Pneumonia with: chlorhexidine oral care    Activity Orders          Diet NPO: NPO starting at 08/05 0002      CRC 40 min  Full Code    Anson Matthews MD  Neurocritical Care  [facility] - ICU 16 WT

## 2020-08-06 NOTE — ASSESSMENT & PLAN NOTE
" 68y/o M pt with PMHx of etoh abuse, HTN who initially presented to OSH for 2 day hx of "shaking", tested positive for COVID-19 and was admitted for viral pneumonia, transferred to [facility] [place] for EEG as part of acute encephalopathy workup. Hospital course complicated by NSTEMI and high suspicion for PE for which he was started on ACS/PE protocol, respiratory failure and AMS requiring intubation and mechanical ventilation. Further workup revealed acute stroke with MRI findings concerning for cardioembolic etiology: "Scattered areas of acute infarction supratentorial and infratentorially in the anterior and posterior circulation." ID consulted for MSSA bacteremia.  --MSSA bacteremia possibly hospital acquired in the setting of superimposed bacterial pneumonia  --however, need to rule out infective endocarditis in the setting of likely cardioembolic stroke.  --BCx 8/2, MSSA  --BCx 8/3 s.aureus; susceptibilities pending  --BCx 8/5 NGTD  --RCx 8/2 MSSA and e.coli    --TTE 8/3, 8/5 without evidence of HF or vegetations    Recommendations:  --continue oxacillin   --recommend SYLVIA to r/o infective endocarditis  --if unable to perform SYLVIA would recommend treating for 6 wks    "

## 2020-08-06 NOTE — ASSESSMENT & PLAN NOTE
Elevated lactic prior to arrival, will trend  Broad spec Abx continued form OSH  Pan Cx  Off Levophed for BP support  Appreciate ID [email or web address]

## 2020-08-06 NOTE — PROGRESS NOTES
"[facility] - ICU 16 WT  Infectious Disease  Progress Note    Patient Name: Atul Mendoza  MRN: [id number]  Admission Date: 8/4/2020  Length of Stay: 2 days  Attending Physician: Anson Almanzar MD  Primary Care Provider: Venice Hernandez MD    Isolation Status: Airborne and Contact and Droplet  Assessment/Plan:      Pneumonia  Pt with hx of COVID-19 viral pneumonia and respiratory failure with concern for superimposed bacterial infection.  --sputum cx 8/2 grew MSSA and e.coli  --currently on day 4 vanc and day 2 cipro ; (ecoli was not susceptible to zosyn)  --sputum cx 8/5 growing e.coli    Recommendations:  --stop vanc and rocephin  --continue oxacillin (MSSA coverage) and ciprofloxacin (e.coli coverage)  --estimated end date for cipro 8/11/20 for a total 7 day course    MSSA bacteremia   70y/o M pt with PMHx of etoh abuse, HTN who initially presented to OSH for 2 day hx of "shaking", tested positive for COVID-19 and was admitted for viral pneumonia, [place] [facility] [place] as part of acute encephalopathy workup. Hospital course complicated by NSTEMI and high suspicion for PE for which he was started on ACS/PE protocol, respiratory failure and AMS requiring intubation and mechanical ventilation. Further workup revealed acute stroke with MRI findings concerning for cardioembolic etiology: "Scattered areas of acute infarction supratentorial and infratentorially in the anterior and posterior circulation." ID consulted for MSSA bacteremia.  --MSSA bacteremia possibly hospital acquired in the setting of superimposed bacterial pneumonia  --however, need to rule out infective endocarditis in the setting of likely cardioembolic stroke.  --BCx 8/2, MSSA  --BCx 8/3 s.aureus; susceptibilities pending  --BCx 8/5 NGTD  --RCx 8/2 MSSA and e.coli    --TTE 8/3, 8/5 without evidence of HF or vegetations    Recommendations:  --continue oxacillin   --recommend SYLVIA to r/o infective endocarditis  --if unable " "to perform SYLVIA would recommend treating for 4-6 wks          Anticipated Disposition: tbd    Thank you for your consult. I will follow-up with patient. Please contact us if you have any additional questions.    Katie Magana MD  Infectious Disease  [facility] - ICU 16 WT    Subjective:     Principal Problem:Acute encephalopathy    HPI: Mr. Mendoza is a 68y/o M pt with PMHx of etoh abuse, HTN who initially presented to OSH for 2 day hx of "shaking", tested positive for COVID-19 and was admitted for viral pneumonia, [place] [facility] [place] as part of acute encephalopathy workup. Hospital course complicated by NSTEMI and high suspicion for PE for which he was started on ACS/PE protocol. Hep switched to argatroban due to concern for HIT, but HIT panel has now resulted neg. Also with AMS and worsening respiratory status requiring intubation and mechanical ventilation with subsequent need for pressor support. Had witnessed myoclonic jerking concerning for seizure activity. CTH concerning for acute infarcts. BCx 8/2 and 8/3 grew MSSA and Resp Cx 8/2 grew MSSA and e.coli. Pt started on vanc and zosyn on 8/2. TTE 8/3 without evidence of vegetations or HF. ID consulted on 8/4 and abx tailored to vanc and ceftriaxone. MRI 8/5 with "Scattered areas of acute infarction supratentorial and infratentorially in the anterior and posterior circulation concerning for cardioembolic etiology." US (8/5) with Lt popliteal and Rt subclavian vein DVT and concern for portal vein thrombosis (8/3).     ID consulted for MSSA bacteremia. Pt remains intubated. EEG pending.    Interval History: No acute events overnight. Repeat TTE without evidence of vegetations. Remains intubated and sedated.    Review of Systems   Unable to perform ROS: Intubated     Objective:     Vital Signs (Most Recent):  Temp: 98.1 °F (36.7 °C) (08/06/20 1600)  Pulse: 77 (08/06/20 1600)  Resp: 16 (08/06/20 1600)  BP: 119/80 (08/06/20 " 1600)  SpO2: 95 % (08/06/20 1600) Vital Signs (24h Range):  Temp:  [96.9 °F (36.1 °C)-98.1 °F (36.7 °C)] 98.1 °F (36.7 °C)  Pulse:  [63-85] 77  Resp:  [14-24] 16  SpO2:  [94 %-100 %] 95 %  BP: (110-148)/(69-87) 119/80  Arterial Line BP: (119-163)/(57-84) 150/68     Weight: 76.2 kg (168 lb)  Body mass index is 22.78 kg/m².    Estimated Creatinine Clearance: 27.6 mL/min (A) (based on SCr of 2.8 mg/dL (H)).    Physical Exam  Vitals signs and nursing note reviewed.     Exam deferred due to COVID-19 and risk for transmission.    Significant Labs: All pertinent labs within the past 24 hours have been reviewed.    Significant Imaging: I have reviewed all pertinent imaging results/findings within the past 24 hours.

## 2020-08-06 NOTE — ASSESSMENT & PLAN NOTE
ISS q 4hr  Possibly d/t steriods that were received at OSH , as HgbA1c is 5.1   Serum glucose increasing today,08/06 and is set to start peptamen intense TFs, start insulin gtt, will reassess when steroid course is completed in am

## 2020-08-06 NOTE — ASSESSMENT & PLAN NOTE
Pt with hx of COVID-19 viral pneumonia and respiratory failure with concern for superimposed bacterial infection.  --sputum cx 8/2 grew MSSA and e.coli  --currently on day 4 vanc and rocephin    Recommendations:  --stop vanc and rocephin  --continue oxacillin (MSSA coverage) and ciprofloxacin (e.coli coverage)  --estimated end date for cipro 8/11/20 for a total 7 day course

## 2020-08-06 NOTE — PROGRESS NOTES
[facility] - ICU 16 WT  Nephrology  Progress Note    Patient Name: Atul Mendoza  MRN: [id number]  Admission Date: 8/4/2020  Hospital Length of Stay: 2 days  Attending Provider: Anson Almanzar MD   Primary Care Physician: Venice Hernandez MD  Principal Problem:Acute encephalopathy    Subjective:     HPI: The patient is a 69 year-old male with a history significant for HTN, ETOH abuse, and now COVID-19 (diagnosed 7/19) who initially presented to a [facility] with complaints AMS and shaking. There were concerns for alcohol withdrawal at that time. He was admitted and treated with dexamethasone and Remdesivir but the patient continued to require O2. On 8/2, seems that the patient decompensated requiring ICU after developing tachycardia, hypotension, and acute hypoxia. He was noted to have an elevated d dimer and troponin with EKG changes. He was transferred to the ICU management and treatment of a PE and STEMI. He was started on Levophed for BP management. The patient had a CTH completed at that time which revealed a new right lacunar stroke. He subsequently transferred to [facility] on 8/4 for escalation of care and concerns for NC status. The patient currently remains intubated and sedated on Levophed and Argatroban. Initial workup by neurocritical care included MRI brain showing scattered lacunar infarcts concerning for cardio-embolic process as well as U/S showing multiple DVT in the left popliteal, right subclavian , and right IJ veins. Patient now with worsening renal function. The patient was admitted with a sCr of 2.0 on 7/19 (unsure of baseline renal function). The patient's sCr fluctuated during his stay at the OSH, but aggressively trended up on 8/2 after becoming hypotensive and tachycardic. Since transferring to [facility] the patient's serum creatinine has been stable at 4.0. His urine output was 350 mL/24 hrs. Nephrology consulted for Oliguric HINA.     Interval History:   Renal  function with signs of improvement over the last 24 hrs. sCr now 2.9 from 4.0. BUN 84 from 99. UOP 1625 mL/24 hrs. Patient started on NS at 75 mL/hr.   He remains net positive 1.7 L/24 hrs.   On Argatabran.   Remains intubated, FiO2 35%.     Review of patient's allergies indicates:  No Known Allergies  Current Facility-Administered Medications   Medication Frequency    0.9%  NaCl infusion Continuous    acetaminophen tablet 650 mg Q6H PRN    albuterol-ipratropium 2.5 mg-0.5 mg/3 mL nebulizer solution 3 mL Q6H    argatroban in sodium chloride 0.9% (conc: 1 mg/mL) Continuous    atorvastatin tablet 40 mg Daily    ciprofloxacin HCl tablet 500 mg Q24H    dextrose 10 % infusion Continuous PRN    dextrose 50% injection 12.5 g PRN    famotidine tablet 20 mg Daily    glucagon (human recombinant) injection 1 mg PRN    hydrocortisone sodium succinate injection 50 mg Q8H    insulin aspart U-100 pen 1-10 Units Q4H PRN    lactulose 20 gram/30 mL solution Soln 15 g TID    norepinephrine 4 mg in dextrose 5% 250 mL infusion (premix) (titrating) Continuous    ondansetron injection 4 mg Q8H PRN    oxacillin 12 g in  mL CONTINUOUS INFUSION Q24H    propofol (DIPRIVAN) 10 mg/mL infusion Continuous    sodium chloride 0.9% flush 10 mL PRN       Objective:     Vital Signs (Most Recent):  Temp: 97.1 °F (36.2 °C) (08/06/20 0800)  Pulse: 70 (08/06/20 1000)  Resp: 18 (08/06/20 1000)  BP: (!) 146/83 (08/06/20 1000)  SpO2: (!) 94 % (08/06/20 1000)  O2 Device (Oxygen Therapy): ventilator (08/06/20 1000) Vital Signs (24h Range):  Temp:  [96.2 °F (35.7 °C)-98 °F (36.7 °C)] 97.1 °F (36.2 °C)  Pulse:  [63-87] 70  Resp:  [17-24] 18  SpO2:  [94 %-100 %] 94 %  BP: (110-146)/(64-83) 146/83  Arterial Line BP: (119-149)/(55-76) 148/70     Weight: 76.2 kg (168 lb) (08/06/20 0856)  Body mass index is 22.78 kg/m².  Body surface area is 1.97 meters squared.    I/O last 3 completed shifts:  In: 4219.2 [I.V.:2602.2; NG/GT:995; IV  Piggyback:622]  Out: 2150 [Urine:1975; Stool:175]    Physical Exam  Vitals signs and nursing note reviewed.   Constitutional:       General: He is not in acute distress.     Appearance: He is ill-appearing.      Interventions: He is sedated and intubated.      Comments: A full-contact physical exam was not possible due to the clinical condition of the patient due to covid 19 infection and the necessity to minimize exposure.     Physical exam of the primary team reviewed.    HENT:      Head: Normocephalic and atraumatic.   Cardiovascular:      Rate and Rhythm: Normal rate.   Pulmonary:      Effort: Pulmonary effort is normal. No respiratory distress. He is intubated.         Significant Labs:  CBC:   Recent Labs   Lab 08/06/20  0517   WBC 6.95   RBC 3.18*   HGB 10.5*   HCT 31.2*   PLT 33*   MCV 98   MCH 33.0*   MCHC 33.7     CMP:   Recent Labs   Lab 08/06/20 0517 08/06/20  0842   * 259*   CALCIUM 6.6* 6.6*   ALBUMIN 1.2* 1.2*   PROT 5.0*  --     136   K 3.4* 3.5   CO2 23 21*    108   BUN 84* 82*   CREATININE 2.9* 2.8*   ALKPHOS 51*  --    ALT 98*  --    AST 72*  --    BILITOT 0.6  --      All labs within the past 24 hours have been reviewed.       Assessment/Plan:     * Acute encephalopathy  - Management per primary team     HINA (acute kidney injury)  The patient is a 69 y.o. male with a significant medical history of HTN, ETOH abuse, now COVID-19 + who presents to the hospital as a transfer from [facility] for evaluation of altered mental status.  The patient decompensated at the OSH requiring intubation and pressor support after developing acute tachycardia and hypoxia thought to be due to PE and/or NSTEMI. the patient had a CTH which revealed findings for a new right lacunar stroke prompting his transfer to [facility].     The patient was admitted to the OSH with serum creatinine of 2.0 which fluctuated throughout his hospital stay. His renal function took a hit after he  decompensated on 8/2. He arrived with a sCr of 4.0 on 8/4 which is essentially stable. His sCr has improved to 3.6 today. UOP remains marginal at this time. Volume status and respiratory status appears to be stable. Other electrolytes stable.     HINA 2/2 iATN due to hemodynamic instabilities in the setting of COVID-19 +/- acute NSTEMI/PE (likely 2/2 hypercoagulability due to COVID-19 infection).     UA: 0 protein, 3+ occult blood, 1+ glucose, 3+ leukocytes, rbc 32, wbc 47    Assessment:  -Still no urgent need for dialysis at this time. Renal function improving. sCr 2.9 from 4.0.  -UOP improving (1625 mL/24 hrs); consider diuretics for volume management if indicated.   -Agree with NS at 75 mL/hr for now. Vent settings stable.   -Frequent renal function panels, q6-8 hrs daily   -Strict I/Os and daily weights  -Avoid hypotension which may worsen HINA  -Continue on mechanical ventilation with 35% FiO2  -Continue to monitor intake and output, daily weights   -Avoid nephrotoxic medication and renal dose medications to GFR  -Will discuss with staff    COVID-19 virus infection  - Management per primary team       Nephrology signing off. Plan of care discussed with Nephrology staff and primary team. Thank you for involving us in the care of Mr. Mendoza. Please call with any additional questions, concerns, or changes in the patient's clinical status.      Manisha Brown DNP, FNP-C  Nephrology  [facility] - ICU 16 WT

## 2020-08-06 NOTE — ASSESSMENT & PLAN NOTE
emergently intubated after declined while on floor in OSH  COVID19 positive  CXR/ABG daily  Will assess and wean vent as able  Fio2 40, peep 5 at this time.  CXR impoving, can begin SBT when awakens

## 2020-08-07 NOTE — ASSESSMENT & PLAN NOTE
Afebrile and without leukocytosis. Concerned for endocarditis due to multiple areas of embolization on brain imaging while actively bacteremic. Repeat blood cultures on 8/5 remain NGTD.   · Continue oxacillin.   · Repeat blood cultures today.   · Recommend SYLVIA if clinically safe to do so.   · If unable to perform SYLVIA then would treat with long term antibiotics (4-6 weeks).

## 2020-08-07 NOTE — SUBJECTIVE & OBJECTIVE
Review of Systems   Unable to perform ROS: Intubated     Objective:     Vitals:  Temp: 97.5 °F (36.4 °C)  Pulse: 73  Rhythm: normal sinus rhythm  BP: 123/73  MAP (mmHg): 93  CVP (mean): 3 mmHg  Resp: 16  SpO2: 98 %  Oxygen Concentration (%): 35  O2 Device (Oxygen Therapy): ventilator  Vent Mode: A/C  Set Rate: 12 BPM  Vt Set: 550 mL  PEEP/CPAP: 5 cmH20  Peak Airway Pressure: 29 cmH2O  Mean Airway Pressure: 11 cmH20  Plateau Pressure: 19 cmH20    Temp  Min: 97 °F (36.1 °C)  Max: 98.1 °F (36.7 °C)  Pulse  Min: 67  Max: 91  BP  Min: 119/71  Max: 149/88  MAP (mmHg)  Min: 90  Max: 114  CVP (mean)  Min: 1 mmHg  Max: 11 mmHg  Resp  Min: 14  Max: 23  SpO2  Min: 93 %  Max: 100 %  Oxygen Concentration (%)  Min: 35  Max: 35    08/06 0701 - 08/07 0700  In: 2658.4 [I.V.:1742]  Out: 675 [Urine:625]   Unmeasured Output  Urine Occurrence: 1  Stool Occurrence: 1  Emesis Occurrence: 0  Pad Count: 1       Physical Exam  Constitutional:       General: He is not in acute distress.  HENT:      Head: Normocephalic.   Eyes:      Pupils: Pupils are equal, round, and reactive to light.   Neck:      Musculoskeletal: Neck supple.   Cardiovascular:      Rate and Rhythm: Regular rhythm.      Pulses: Normal pulses.   Pulmonary:      Breath sounds: Normal breath sounds.   Abdominal:      General: Abdomen is flat.      Palpations: Abdomen is soft.   Musculoskeletal:         General: Swelling present.   Skin:     General: Skin is warm.      Capillary Refill: Capillary refill takes less than 2 seconds.   Neurological:      Comments: Improved level of alertness although still requires large amount of physical stimulation to open eyes  Withdrawal response present bilat but not following commands           Medications:  Continuoussodium chloride 0.9%, Last Rate: 75 mL/hr at 08/05/20 0900  argatroban in 0.9 % sod chlor, Last Rate: 0.349 mcg/kg/min (08/06/20 2200)  norepinephrine bitartrate-D5W, Last Rate: Stopped (08/05/20 0700)  propofoL, Last Rate:  5 mcg/kg/min (08/07/20 0600)    Scheduledalbuterol-ipratropium, 3 mL, Q6H  atorvastatin, 40 mg, Daily  ciprofloxacin HCl, 500 mg, Q12H  famotidine, 20 mg, Daily  insulin aspart U-100, 4 Units, Q4H  insulin detemir U-100, 10 Units, BID  lactulose, 15 g, TID  oxacillin 12 g in  mL CONTINUOUS INFUSION, 12 g, Q24H    PRNsodium chloride, , Q24H PRN  acetaminophen, 650 mg, Q6H PRN  dextrose 50%, 12.5 g, PRN  glucagon (human recombinant), 1 mg, PRN  insulin aspart U-100, 1-10 Units, Q4H PRN  ondansetron, 4 mg, Q8H PRN  sodium chloride 0.9%, 10 mL, PRN      Today I personally reviewed pertinent medications, lines/drains/airways, imaging, cardiology results, laboratory results, microbiology results, notably:    Diet  Diet NPO  Diet NPO

## 2020-08-07 NOTE — PLAN OF CARE
Problem: Aspiration (Enteral Nutrition)  Goal: Absence of Aspiration Signs/Symptoms  Outcome: Ongoing, Progressing   Recommendations    Recommendation:   1. Continue TF of Peptamen Intense VHP @ 50 mL/hr to provide pt with 1200 kcal, 110 g protein and 1008 mL free water.    -Additional water per MD. Hold for residuals >500 mL.   2. RD to monitor and follow up    Goals: 1.) Pt to return/meet >75% EEN and EPN by follow up.   Nutrition Goal Status: goal met, progressing towards goal  Communication of RD Recs: other (comment)(POC)

## 2020-08-07 NOTE — ASSESSMENT & PLAN NOTE
emergently intubated after declined while on floor in OSH  COVID19 positive  CXR/ABG daily  Will assess and wean vent as able  Fio2 40, peep 5 at this time.  CXR impoving, can begin SBT when awakens   08/07: SBT trial

## 2020-08-07 NOTE — PT/OT/SLP PROGRESS
Occupational Therapy      Patient Name:  Atul Mendoza   MRN:  [id number]    OT orders received and acknowledged. Patient not seen today secondary to pt remains intubated and sedated.  Angel follow-up as appropriate .    Tiffanie Isabel OT  8/7/2020

## 2020-08-07 NOTE — SUBJECTIVE & OBJECTIVE
Interval History: Patient transferred on 8/5 for NCC evaluation of encephalopathy. Found to have MSSA bacteremia and possible MSSA plus E coli bibasilar pneumonia. Work up revealed scattered areas of cerebral infarction. Concern from ID standpoint for endocarditis with septic showering. Afebrile and without leukocytosis. Transitioned to Cipro plus oxacillin. Blood cultures 8/5 remain NGTD.     Review of Systems   Unable to perform ROS: Intubated     Objective:     Vital Signs (Most Recent):  Temp: 97.1 °F (36.2 °C) (08/07/20 0400)  Pulse: 74 (08/07/20 0804)  Resp: 17 (08/07/20 0804)  BP: (!) 144/93 (08/07/20 0600)  SpO2: 99 % (08/07/20 0804) Vital Signs (24h Range):  Temp:  [97.1 °F (36.2 °C)-98.1 °F (36.7 °C)] 97.1 °F (36.2 °C)  Pulse:  [67-91] 74  Resp:  [14-23] 17  SpO2:  [94 %-100 %] 99 %  BP: (119-149)/(68-93) 144/93  Arterial Line BP: (142-171)/(67-84) 168/79     Weight: 76.5 kg (168 lb 10.4 oz)  Body mass index is 22.87 kg/m².    Estimated Creatinine Clearance: 35.3 mL/min (A) (based on SCr of 2.2 mg/dL (H)).    Physical Exam  Vitals signs and nursing note reviewed.   Constitutional:       Appearance: He is well-developed.      Interventions: He is sedated and intubated.   Pulmonary:      Effort: Pulmonary effort is normal. He is intubated.   Skin:     General: Skin is warm and dry.   Neurological:      Mental Status: He is lethargic.         Significant Labs:   Blood Culture:   Recent Labs   Lab 08/02/20  0605 08/02/20  0615 08/03/20  0955 08/05/20  0141 08/05/20  0143   LABBLOO Gram stain katt bottle: Gram positive cocci in clusters resembling Staph   Results called to and read back by:Helen Klein RN 08/03/2020  05:47  Gram stain aer bottle: Gram positive cocci in clusters resembling Staph   08/03/2020  10:08  STAPHYLOCOCCUS AUREUS  ID consult required at [facility],[place] and [facility].  *  COAGULASE-NEGATIVE STAPHYLOCOCCUS SPECIES  Organism is a probable contaminant  * No Growth to date  No  Growth to date  No Growth to date  No Growth to date  No Growth to date Gram stain aer bottle: Gram positive cocci in clusters resembling Staph  Results called to and read back by: Rola Fang RN. 08/04/2020    16:58  STAPHYLOCOCCUS AUREUS  ID consult required at [facility].  * No Growth to date  No Growth to date  No Growth to date No Growth to date  No Growth to date  No Growth to date     BMP:   Recent Labs   Lab 08/07/20  0459   *      K 3.2*   *   CO2 20*   BUN 74*   CREATININE 2.2*   CALCIUM 6.8*   MG 2.4     CBC:   Recent Labs   Lab 08/06/20  1124 08/06/20  1611 08/07/20  0057   WBC 6.43 6.35 6.28   HGB 10.7* 11.1* 10.3*   HCT 32.2* 33.4* 31.0*   PLT 30* 31* 51*     CSF:   Recent Labs   Lab 08/04/20  0924   CSFCULTURE No Growth to date     Urine Culture:   Recent Labs   Lab 08/02/20 2039 08/05/20  0116   LABURIN No growth No significant growth     Urine Studies:   Recent Labs   Lab 07/15/20  1336 07/20/20  0700  08/05/20  0116   COLORU Yellow Yellow   < > Yellow   APPEARANCEUA Clear Hazy*   < > Hazy*   PHUR 7.0 5.0   < > 5.0   SPECGRAV 1.020 1.010   < > 1.015   PROTEINUA Trace* Negative   < > Negative   GLUCUA Negative Negative   < > 1+*   KETONESU Negative Negative   < > Negative   BILIRUBINUA Negative Negative   < > Negative   OCCULTUA 2+* 2+*   < > 3+*   NITRITE Negative Negative   < > Negative   UROBILINOGEN Negative  --   --   --    LEUKOCYTESUR Negative Negative   < > 3+*   RBCUA 10* 0   < > 32*   WBCUA 0 0   < > 47*   BACTERIA Rare Rare  --  Occasional   SQUAMEPITHEL  --  0   < > 1   HYALINECASTS  --  1  --   --     < > = values in this interval not displayed.       Significant Imaging: I have reviewed all pertinent imaging results/findings within the past 24 hours.

## 2020-08-07 NOTE — PROGRESS NOTES
[facility] - ICU 16 WT  Adult Nutrition  Progress Note    SUMMARY       Recommendations    Recommendation:   1. Continue TF of Peptamen Intense VHP @ 50 mL/hr to provide pt with 1200 kcal, 110 g protein and 1008 mL free water.    -Additional water per MD. Hold for residuals >500 mL.   2. RD to monitor and follow up    Goals: 1.) Pt to return/meet >75% EEN and EPN by follow up.   Nutrition Goal Status: goal met, progressing towards goal  Communication of RD Recs: other (comment)(POC)    Reason for Assessment    Reason For Assessment: RD follow-up  Diagnosis: other (see comments)(acute encephalopathy)  Relevant Medical History: HTN, ETOH abuse, COVID19  Interdisciplinary Rounds: did not attend  General Information Comments: Pt remains intuabted and sedated. TF running at goal rate per chart review, pt tolerating well at this time. Previously pt with poor PO x >7 dyas. Wt stable since admit. Pt at risk for malnutrition due to decreased PO and possible edema. NFPE not performed, patient has been screened for possible COVID-19 and has been placed on airborne and contact precautions.Patient is noted as being positive for COVID-19.  Nutrition Discharge Planning: Unable to be determined at this time.     Nutrition Risk Screen    Nutrition Risk Screen: tube feeding or parenteral nutrition    Nutrition/Diet History    Food Allergies: NKFA  Factors Affecting Nutritional Intake: NPO, on mechanical ventilation    Anthropometrics    Temp: 97.5 °F (36.4 °C)  Height Method: Estimated  Height: 6' (182.9 cm)  Height (inches): 72 in  Weight Method: Bed Scale  Weight: 76.5 kg (168 lb 10.4 oz)  Weight (lb): 168.65 lb  Ideal Body Weight (IBW), Male: 178 lb  % Ideal Body Weight, Male (lb): 94.38 %  BMI (Calculated): 22.9  BMI Grade: 18.5-24.9 - normal    Lab/Procedures/Meds    Pertinent Labs Reviewed: reviewed  Pertinent Labs Comments: K 3.2; BUN 74; Cr 2.2; Glucose 212  Pertinent Medications Reviewed: reviewed  Pertinent  Medications Comments: insulin; statin     Estimated/Assessed Needs    Weight Used For Calorie Calculations: 76.5 kg (168 lb 10.4 oz)  Energy Calorie Requirements (kcal): 1740 kcal/  Energy Need Method: [facility]  Protein Requirements:  g/day(1.2-1.5 g/kg)  Weight Used For Protein Calculations: 76.5 kg (168 lb 10.4 oz)  Fluid Requirements (mL): 1 mL/kcal or per MD  Estimated Fluid Requirement Method: RDA Method(or per MD)  RDA Method (mL): 1740    Nutrition Prescription Ordered    Current Diet Order: NPO  Current Nutrition Support Formula Ordered: Peptamen Intense VHP  Current Nutrition Support Rate Ordered: 50 (ml)  Current Nutrition Support Frequency Ordered: mL/hr    Evaluation of Received Nutrient/Fluid Intake    Enteral Calories (kcal): 1200  Enteral Protein (gm): 110  Enteral (Free Water) Fluid (mL): 1008  Other Calories (kcal): 60(propofol)  % Kcal Needs: 72  % Protein Needs: 82  IV Fluid (mL): 1800  I/O: +4.0 L since admit  Energy Calories Required: not meeting needs  Protein Required: meeting needs  Fluid Required: meeting needs  Comments: LBM 8/7  Tolerance: tolerating  % Intake of Estimated Energy Needs: 75 - 100 %  % Meal Intake: NPO    Nutrition Risk    Level of Risk/Frequency of Follow-up: low     Assessment and Plan  Nutrition Problem  Inadequate energy intake      Related to (etiology):   Decreased ability to consume sufficient energy     Signs and Symptoms (as evidenced by):   NPO, mechanical ventilation; <75% of nutritional needs being met     Interventions(treatment strategy):  Collaboration of care with other providers  Referral of care     Nutrition Diagnosis Status:   Resolving     Monitor and Evaluation    Food and Nutrient Intake: enteral nutrition intake  Food and Nutrient Adminstration: enteral and parenteral nutrition administration  Anthropometric Measurements: weight, weight change, body mass index  Biochemical Data, Medical Tests and Procedures: electrolyte and renal panel,  gastrointestinal profile, glucose/endocrine profile, inflammatory profile, lipid profile  Nutrition-Focused Physical Findings: overall appearance     Malnutrition Assessment  Weight Loss (Malnutrition): other (see comments)(DIVYA)  Energy Intake (Malnutrition): less than 75% for greater than 7 days   Edema (Fluid Accumulation): 2-->mild(BUE)     Nutrition Follow-Up    RD Follow-up?: Yes

## 2020-08-07 NOTE — ASSESSMENT & PLAN NOTE
ISS q 4hr  Possibly d/t steriods that were received at OSH , as HgbA1c is 5.1   Serum glucose increasing today,08/06 and is set to start peptamen intense TFs, start insulin gtt, will reassess when steroid course is completed in am  08/07: off insulin gtt, scheduled detmir/aspartate

## 2020-08-07 NOTE — ASSESSMENT & PLAN NOTE
Pt with hx of COVID-19 viral pneumonia and respiratory failure with concern for superimposed bacterial infection. Sputum cultures initially with E coli and MSSA. Repeat sputum samples with E coli.   · Continue Cipro. Will plan for a 7 day course. End date: 8/11/20.

## 2020-08-07 NOTE — ASSESSMENT & PLAN NOTE
transferred to [facility] to eval for NCSE  Keppra and propofol started at OSH  cEEG pending  08/07: only slowing seen, wean off propofol so exam can be followed, reduce keppra to 500mg bid

## 2020-08-07 NOTE — PROGRESS NOTES
"[facility]  Infectious Disease  Progress Note    Patient Name: Atul Mendoza  MRN: [id number]  Admission Date: 8/4/2020  Length of Stay: 3 days  Attending Physician: Anson Almanzar MD  Primary Care Provider: Venice Hernandez MD    Isolation Status: Airborne and Contact and Droplet  Assessment/Plan:      Pneumonia  Pt with hx of COVID-19 viral pneumonia and respiratory failure with concern for superimposed bacterial infection. Sputum cultures initially with E coli and MSSA. Repeat sputum samples with E coli.   · Continue Cipro. Will plan for a 7 day course. End date: 8/11/20.      MSSA bacteremia  Afebrile and without leukocytosis. Concerned for endocarditis due to multiple areas of embolization on brain imaging while actively bacteremic. Repeat blood cultures on 8/5 remain NGTD.   · Continue oxacillin.   · Repeat blood cultures today.   · Recommend SYLVIA if clinically safe to do so.   · If unable to perform SYLVIA then would treat with long term antibiotics (4-6 weeks).       Anticipated Disposition: per primary    Thank you for your consult. I will follow-up with patient. Please contact us if you have any additional questions.    Temitope Pride MD  Infectious Disease  [facility] ICU 16 [facility]    Subjective:     Principal Problem:Acute encephalopathy    HPI: Mr. Mendoza is a 70y/o M pt with PMHx of etoh abuse, HTN who initially presented to OSH for 2 day hx of "shaking", tested positive for COVID-19 and was admitted for viral pneumonia, transferred to [facility] on 8/4 for EEG as part of acute encephalopathy workup. Hospital course complicated by NSTEMI and high suspicion for PE for which he was started on ACS/PE protocol. Hep switched to argatroban due to concern for HIT, but HIT panel has now resulted neg. Also with AMS and worsening respiratory status requiring intubation and mechanical ventilation with subsequent need for pressor support. Had witnessed myoclonic jerking " "concerning for seizure activity. CTH concerning for acute infarcts. BCx 8/2 and 8/3 grew MSSA and Resp Cx 8/2 grew MSSA and e.coli. Pt started on vanc and zosyn on 8/2. TTE 8/3 without evidence of vegetations or HF. ID consulted on 8/4 and abx tailored to vanc and ceftriaxone. MRI 8/5 with "Scattered areas of acute infarction supratentorial and infratentorially in the anterior and posterior circulation concerning for cardioembolic etiology." US (8/5) with Lt popliteal and Rt subclavian vein DVT and concern for portal vein thrombosis (8/3).     ID consulted for MSSA bacteremia. Pt remains intubated. EEG pending.    Interval History: Patient transferred on 8/5 for NCC evaluation of encephalopathy. Found to have MSSA bacteremia and possible MSSA plus E coli bibasilar pneumonia. Work up revealed scattered areas of cerebral infarction. Concern from ID standpoint for endocarditis with septic showering. Afebrile and without leukocytosis. Transitioned to Cipro plus oxacillin. Blood cultures 8/5 remain NGTD.     Review of Systems   Unable to perform ROS: Intubated     Objective:     Vital Signs (Most Recent):  Temp: 97.1 °F (36.2 °C) (08/07/20 0400)  Pulse: 74 (08/07/20 0804)  Resp: 17 (08/07/20 0804)  BP: (!) 144/93 (08/07/20 0600)  SpO2: 99 % (08/07/20 0804) Vital Signs (24h Range):  Temp:  [97.1 °F (36.2 °C)-98.1 °F (36.7 °C)] 97.1 °F (36.2 °C)  Pulse:  [67-91] 74  Resp:  [14-23] 17  SpO2:  [94 %-100 %] 99 %  BP: (119-149)/(68-93) 144/93  Arterial Line BP: (142-171)/(67-84) 168/79     Weight: 76.5 kg (168 lb 10.4 oz)  Body mass index is 22.87 kg/m².    Estimated Creatinine Clearance: 35.3 mL/min (A) (based on SCr of 2.2 mg/dL (H)).    Physical Exam  Vitals signs and nursing note reviewed.   Constitutional:       Appearance: He is well-developed.      Interventions: He is sedated and intubated.   Pulmonary:      Effort: Pulmonary effort is normal. He is intubated.   Skin:     General: Skin is warm and dry.   Neurological: "      Mental Status: He is lethargic.         Significant Labs:   Blood Culture:   Recent Labs   Lab 08/02/20  0605 08/02/20  0615 08/03/20  0955 08/05/20  0141 08/05/20  0143   LABBLOO Gram stain katt bottle: Gram positive cocci in clusters resembling Staph   Results called to and read back by:Helen Klein RN 08/03/2020  05:47  Gram stain aer bottle: Gram positive cocci in clusters resembling Staph   08/03/2020  10:08  STAPHYLOCOCCUS AUREUS  ID consult required at [facility] locations.  *  COAGULASE-NEGATIVE STAPHYLOCOCCUS SPECIES  Organism is a probable contaminant  * No Growth to date  No Growth to date  No Growth to date  No Growth to date  No Growth to date Gram stain aer bottle: Gram positive cocci in clusters resembling Staph  Results called to and read back by: Rola Fang RN. 08/04/2020    16:58  STAPHYLOCOCCUS AUREUS  ID consult required at [facility].  * No Growth to date  No Growth to date  No Growth to date No Growth to date  No Growth to date  No Growth to date     BMP:   Recent Labs   Lab 08/07/20  0459   *      K 3.2*   *   CO2 20*   BUN 74*   CREATININE 2.2*   CALCIUM 6.8*   MG 2.4     CBC:   Recent Labs   Lab 08/06/20  1124 08/06/20  1611 08/07/20  0057   WBC 6.43 6.35 6.28   HGB 10.7* 11.1* 10.3*   HCT 32.2* 33.4* 31.0*   PLT 30* 31* 51*     CSF:   Recent Labs   Lab 08/04/20  0924   CSFCULTURE No Growth to date     Urine Culture:   Recent Labs   Lab 08/02/20 2039 08/05/20  0116   LABURIN No growth No significant growth     Urine Studies:   Recent Labs   Lab 07/15/20  1336 07/20/20  0700  08/05/20  0116   COLORU Yellow Yellow   < > Yellow   APPEARANCEUA Clear Hazy*   < > Hazy*   PHUR 7.0 5.0   < > 5.0   SPECGRAV 1.020 1.010   < > 1.015   PROTEINUA Trace* Negative   < > Negative   GLUCUA Negative Negative   < > 1+*   KETONESU Negative Negative   < > Negative   BILIRUBINUA Negative Negative   < > Negative    OCCULTUA 2+* 2+*   < > 3+*   NITRITE Negative Negative   < > Negative   UROBILINOGEN Negative  --   --   --    LEUKOCYTESUR Negative Negative   < > 3+*   RBCUA 10* 0   < > 32*   WBCUA 0 0   < > 47*   BACTERIA Rare Rare  --  Occasional   SQUAMEPITHEL  --  0   < > 1   HYALINECASTS  --  1  --   --     < > = values in this interval not displayed.       Significant Imaging: I have reviewed all pertinent imaging results/findings within the past 24 hours.

## 2020-08-07 NOTE — PROGRESS NOTES
"[facility]  Neurocritical Care  Progress Note    Admit Date: 8/4/2020  Service Date: 08/07/2020  Length of Stay: 3    Subjective:     Chief Complaint: Acute encephalopathy    History of Present Illness:  69-year-old AA male with a PMHx of HTN and alcohol abuse who presents as transfer fro [facility]. He originally presented to the [facility] ED due shaking for 2 days. Patient was seen in the ED at [facility] for similar compliant and was discharged home on Hydroxyzine without improvement in his tremulousness. Patient reported occasional alcohol drinking, however when asked to quantify, he reported drinking at least a case of 12-bottles of beer daily for over 20 year. Unable to confirm or deny any history of withdrawal. When asked why he's in the hospital, he stated "I need to be fixed up" but did not report any specific complaint. Unable to answer living situation. Denied any F/C, HA, visual changes, rhinitis, sputum production, anosmia, ageusia, CP, SOB, N/V, abdominal pain, bleeding (hemoptysis / hematemesis, hematuria, BRBPR), C/D, or changes in urinary habits. Denied any sick contacts or recent travel. Patient stated that he does not want to drink alcohol anymore. He was admitted there and durign work-up was covid positive. Overnight he became Tachycardic (150s) with hypoxia (~80% SpO2) on NRB with Tachypnea.  Elevated D-Dimer and Troponins with EKG changes. Placed on ACS/VTE tx for PE vs MI. BIPAP palced with improvement in hypoxia 90% SpO2. Patient placed in ICU for closer monitoring and respiratory support with concerns for pending respiratory failure in the setting of PE and NSTEMI.    In the Am, he appeared more altered, and was intubated d/t concerns of resp. Failure and encephalopathy. He was started on pressors for hypotension. During this time, body jerking was noted, which prompted a CTH which showed new infarcts. He was started on Keppra and propofol, and transferred to [facility] [place]SE " eval. There was alos concerns for sepsis,  So ABX started.                Hospital Course: 8/5/2020 Arrived [facility], under NCC for eval of encephalopathy, r/o status  8/6/2020: renal function improving, although HIT antibody negative, high suspicion for  HIT remains, serotonin assay sent, white count normalizing on Abx, off pressors >24 hrs, wean sedation  8/7/20: renal function gradually improving, serotonin assay pending, plt count slightly improved, afebrile, nl white count, placed on psv- possible extubation today        Review of Systems   Unable to perform ROS: Intubated     Objective:     Vitals:  Temp: 97.5 °F (36.4 °C)  Pulse: 73  Rhythm: normal sinus rhythm  BP: 123/73  MAP (mmHg): 93  CVP (mean): 3 mmHg  Resp: 16  SpO2: 98 %  Oxygen Concentration (%): 35  O2 Device (Oxygen Therapy): ventilator  Vent Mode: A/C  Set Rate: 12 BPM  Vt Set: 550 mL  PEEP/CPAP: 5 cmH20  Peak Airway Pressure: 29 cmH2O  Mean Airway Pressure: 11 cmH20  Plateau Pressure: 19 cmH20    Temp  Min: 97 °F (36.1 °C)  Max: 98.1 °F (36.7 °C)  Pulse  Min: 67  Max: 91  BP  Min: 119/71  Max: 149/88  MAP (mmHg)  Min: 90  Max: 114  CVP (mean)  Min: 1 mmHg  Max: 11 mmHg  Resp  Min: 14  Max: 23  SpO2  Min: 93 %  Max: 100 %  Oxygen Concentration (%)  Min: 35  Max: 35    08/06 0701 - 08/07 0700  In: 2658.4 [I.V.:1742]  Out: 675 [Urine:625]   Unmeasured Output  Urine Occurrence: 1  Stool Occurrence: 1  Emesis Occurrence: 0  Pad Count: 1       Physical Exam  Constitutional:       General: He is not in acute distress.  HENT:      Head: Normocephalic.   Eyes:      Pupils: Pupils are equal, round, and reactive to light.   Neck:      Musculoskeletal: Neck supple.   Cardiovascular:      Rate and Rhythm: Regular rhythm.      Pulses: Normal pulses.   Pulmonary:      Breath sounds: Normal breath sounds.   Abdominal:      General: Abdomen is flat.      Palpations: Abdomen is soft.   Musculoskeletal:         General: Swelling present.   Skin:     General:  Skin is warm.      Capillary Refill: Capillary refill takes less than 2 seconds.   Neurological:      Comments: Improved level of alertness although still requires large amount of physical stimulation to open eyes  Withdrawal response present bilat but not following commands           Medications:  Continuoussodium chloride 0.9%, Last Rate: 75 mL/hr at 08/05/20 0900  argatroban in 0.9 % sod chlor, Last Rate: 0.349 mcg/kg/min (08/06/20 2200)  norepinephrine bitartrate-D5W, Last Rate: Stopped (08/05/20 0700)  propofoL, Last Rate: 5 mcg/kg/min (08/07/20 0600)    Scheduledalbuterol-ipratropium, 3 mL, Q6H  atorvastatin, 40 mg, Daily  ciprofloxacin HCl, 500 mg, Q12H  famotidine, 20 mg, Daily  insulin aspart U-100, 4 Units, Q4H  insulin detemir U-100, 10 Units, BID  lactulose, 15 g, TID  oxacillin 12 g in  mL CONTINUOUS INFUSION, 12 g, Q24H    PRNsodium chloride, , Q24H PRN  acetaminophen, 650 mg, Q6H PRN  dextrose 50%, 12.5 g, PRN  glucagon (human recombinant), 1 mg, PRN  insulin aspart U-100, 1-10 Units, Q4H PRN  ondansetron, 4 mg, Q8H PRN  sodium chloride 0.9%, 10 mL, PRN      Today I personally reviewed pertinent medications, lines/drains/airways, imaging, cardiology results, laboratory results, microbiology results, notably:    Diet  Diet NPO  Diet NPO        Assessment/Plan:     Neuro  Non-convulsive status epilepticus  transferred to [facility] to [facility] for NCSE  Keppra and propofol started at OSH  cEEG pending  08/07: only slowing seen, wean off propofol so exam can be followed, reduce keppra to 500mg bid      Pulmonary  Acute respiratory failure with hypoxia  emergently intubated after declined while on floor in OSH  COVID19 positive  CXR/ABG daily  Will assess and wean vent as able  Fio2 40, peep 5 at this time.  CXR impoving, can begin SBT when awakens   08/07: SBT trial      Renal/  HINA (acute kidney injury)  CRT 4.3, GFR 15.4  K < 5, no metabolic acidosis noted on ABG  Fio2 needs Are minimal, given COVID19    Follow I/O, making urine  Consider nephrology consult if acute changes  08/06: improving, making urine, cont ivfs    ID  Severe sepsis  Elevated lactic prior to arrival, will trend  Broad spec Abx continued form OSH  Pan Cx  Off Levophed for BP support  Appreciate ID imput    Hematology  Thrombocytopenia  Etiology again remains unclear  Although suspicion of prothrombotic state related to HIT, would keep plt count >50K in presence of argatroban    HIT (heparin-induced thrombocytopenia)  Concerned for HIT, heparin switched to argatroban   08/06: continue argatroban until serotonin assay result returns    Endocrine  Hyperglycemia  ISS q 4hr  Possibly d/t steriods that were received at OSH , as HgbA1c is 5.1   Serum glucose increasing today,08/06 and is set to start peptamen intense TFs, start insulin gtt, will reassess when steroid course is completed in am  08/07: off insulin gtt, scheduled detmir/aspartate    Other  MSSA bacteremia  vanc d/c'd, placed on continuous oxacillin and cipro  Follow up repeat blood cultures, TTE ordered    COVID-19 virus infection  covid positive, intubated at OSH   Placed on Covid Unit,             The patient is being Prophylaxed for:  Venous Thromboembolism with: Chemical  Stress Ulcer with: PPI  Ventilator Pneumonia with: chlorhexidine oral care    Activity Orders          Diet NPO: NPO starting at 08/05 0002      CRC 38 min  Full Code    Anson Matthews MD  Neurocritical Care  [facility] 16 WT

## 2020-08-07 NOTE — PT/OT/SLP PROGRESS
Physical Therapy      Patient Name:  Atul Mendoza   MRN:  [id number]    Patient not seen today secondary to (pt not seen 2nd to being intubated and sedated.). Will follow-up at a later date..    Jory Muse, PT   8/7/2020

## 2020-08-07 NOTE — PROGRESS NOTES
Pharmacist Renal Dose Adjustment Note    Atul Mendoza is a 69 y.o. male being treated with the medication ciprofloxacin    Recent Labs   Lab 08/06/20  1611 08/07/20  0057 08/07/20  0459   CREATININE 2.5* 2.3* 2.2*     Serum creatinine: 2.2 mg/dL (H) 08/07/20 0459  Estimated creatinine clearance: 35.3 mL/min (A)    Ciprofloxacin 500 mg Q24H will be renally adjusted to 500 mg Q12H for CrCl >30 mL/min to avoid underdosing. SCr trending down.       Kirstie Loera, PharmD, BCPS  Neurocritical Care Pharmacist  [id number]

## 2020-08-08 PROBLEM — R78.81 E COLI BACTEREMIA: Status: ACTIVE | Noted: 2020-01-01

## 2020-08-08 PROBLEM — B96.20 E COLI BACTEREMIA: Status: ACTIVE | Noted: 2020-01-01

## 2020-08-08 NOTE — PROGRESS NOTES
"[facility]  Neurocritical Care  Progress Note    Admit Date: 8/4/2020  Service Date: 08/08/2020  Length of Stay: 4    Subjective:     Chief Complaint: Acute encephalopathy    History of Present Illness:  69-year-old AA male with a PMHx of HTN and alcohol abuse who presents as transfer fro [facility]. He originally presented to the [facility] ED due shaking for 2 days. Patient was seen in the ED at [facility] for similar compliant and was discharged home on Hydroxyzine without improvement in his tremulousness. Patient reported occasional alcohol drinking, however when asked to quantify, he reported drinking at least a case of 12-bottles of beer daily for over 20 year. Unable to confirm or deny any history of withdrawal. When asked why he's in the hospital, he stated "I need to be fixed up" but did not report any specific complaint. Unable to answer living situation. Denied any F/C, HA, visual changes, rhinitis, sputum production, anosmia, ageusia, CP, SOB, N/V, abdominal pain, bleeding (hemoptysis / hematemesis, hematuria, BRBPR), C/D, or changes in urinary habits. Denied any sick contacts or recent travel. Patient stated that he does not want to drink alcohol anymore. He was admitted there and durign work-up was covid positive. Overnight he became Tachycardic (150s) with hypoxia (~80% SpO2) on NRB with Tachypnea.  Elevated D-Dimer and Troponins with EKG changes. Placed on ACS/VTE tx for PE vs MI. BIPAP palced with improvement in hypoxia 90% SpO2. Patient placed in ICU for closer monitoring and respiratory support with concerns for pending respiratory failure in the setting of PE and NSTEMI.    In the Am, he appeared more altered, and was intubated d/t concerns of resp. Failure and encephalopathy. He was started on pressors for hypotension. During this time, body jerking was noted, which prompted a CTH which showed new infarcts. He was started on Keppra and propofol, and [place] [facility] [place] " [place]. There was alos concerns for sepsis,  So ABX started.                Hospital Course: 8/5/2020 Arrived [facility], under NCC for eval of encephalopathy, r/o status  8/6/2020: renal function improving, although HIT antibody negative, high suspicion for  HIT remains, serotonin assay sent, white count normalizing on Abx, off pressors >24 hrs, wean sedation  8/7/20: renal function gradually improving, serotonin assay pending, plt count slightly improved, afebrile, nl white count, placed on psv- possible extubation today  8/8/20: CVP increased and having more difficulties oxygenating, start diuresis with 60mg iv lasix, 40mEQ potassium po(follow K closely while being diuresed, white count and temp nl, has not sufficiently awakened off sedation since yesterday- repeat head ct        Review of Systems   Unable to perform ROS: Intubated     Objective:     Vitals:  Temp: 97.8 °F (36.6 °C)  Pulse: 93  Rhythm: sinus tachycardia  BP: 114/68  MAP (mmHg): 86  CVP (mean): 9 mmHg  Resp: (!) 25  SpO2: 96 %  Oxygen Concentration (%): 35  O2 Device (Oxygen Therapy): ventilator  Vent Mode: A/C  Set Rate: 12 BPM  Vt Set: 550 mL  PEEP/CPAP: 5 cmH20  Peak Airway Pressure: 40 cmH2O  Mean Airway Pressure: 14 cmH20  Plateau Pressure: 24 cmH20    Temp  Min: 93.2 °F (34 °C)  Max: 97.8 °F (36.6 °C)  Pulse  Min: 74  Max: 96  BP  Min: 112/67  Max: 151/97  MAP (mmHg)  Min: 84  Max: 119  CVP (mean)  Min: 3 mmHg  Max: 13 mmHg  Resp  Min: 16  Max: 41  SpO2  Min: 93 %  Max: 98 %  Oxygen Concentration (%)  Min: 35  Max: 35    08/07 0701 - 08/08 0700  In: 1728.8 [I.V.:78.8]  Out: 1900 [Urine:1850]   Unmeasured Output  Urine Occurrence: 1  Stool Occurrence: 1  Emesis Occurrence: 0  Pad Count: 1       Physical Exam  Constitutional:       General: He is not in acute distress.  HENT:      Head: Normocephalic.   Eyes:      Pupils: Pupils are equal, round, and reactive to light.   Neck:      Musculoskeletal: Neck supple.   Cardiovascular:      Rate  and Rhythm: Regular rhythm.      Pulses: Normal pulses.   Pulmonary:      Breath sounds: Normal breath sounds.   Abdominal:      General: Abdomen is flat.      Palpations: Abdomen is soft.   Musculoskeletal:         General: Swelling present.   Skin:     General: Skin is warm.      Capillary Refill: Capillary refill takes less than 2 seconds.   Neurological:      Comments: Improved level of alertness although still requires large amount of physical stimulation to open eyes  Withdrawal response present again mostly on left with widened palpebral fissure on right           Medications:  Continuousargatroban in 0.9 % sod chlor, Last Rate: 0.349 mcg/kg/min (08/08/20 0232)  propofoL, Last Rate: 5 mcg/kg/min (08/07/20 1800)    Scheduledalbuterol-ipratropium, 3 mL, Q6H  atorvastatin, 40 mg, Daily  ciprofloxacin HCl, 500 mg, Q12H  famotidine, 20 mg, Daily  insulin aspart U-100, 4 Units, Q4H  insulin detemir U-100, 10 Units, BID  lactulose, 15 g, TID  oxacillin 12 g in  mL CONTINUOUS INFUSION, 12 g, Q24H    PRNsodium chloride, , Q24H PRN  acetaminophen, 650 mg, Q6H PRN  dextrose 50%, 12.5 g, PRN  fentaNYL, 12.5 mcg, Q2H PRN  glucagon (human recombinant), 1 mg, PRN  insulin aspart U-100, 1-10 Units, Q4H PRN  ondansetron, 4 mg, Q8H PRN  sodium chloride 0.9%, 10 mL, PRN      Today I personally reviewed pertinent medications, lines/drains/airways, imaging, cardiology results, laboratory results, microbiology results, notably:    Diet  Diet NPO  Diet NPO        Assessment/Plan:     Neuro  Non-convulsive status epilepticus  [place] [facility] [place] [facility]  Keppra and propofol started at OSH  cEEG pending  08/07: only slowing seen, wean off propofol so exam can be followed, reduce keppra to 500mg bid  08/08: not awakening, may need repeat EEG if CT head without change      Altered mental status  Multifactorial, main component for decline in LOC is non titrating propofol  Hold sedation, may restart titration to RASS -1 to  -2  08/08: currently has findings suggesting a right hemiparesis, repeat head ct    Renal/  HINA (acute kidney injury)  CRT 4.3, GFR 15.4  K < 5, no metabolic acidosis noted on ABG  Fio2 needs Are minimal, given COVID19   Follow I/O, making urine  Consider nephrology consult if acute changes  08/06: improving, making urine, cont ivfs  08/08: continues to improve, requires diuresis, follow uop, hold ivfs    ID  Severe sepsis  Elevated lactic prior to arrival, will trend  Broad spec Abx continued form OSH  Pan Cx  Off Levophed for BP support  Appreciate ID imput  08/08: remains with nl white count and fever curve    Hematology  Thrombocytopenia  Etiology again remains unclear  Although suspicion of prothrombotic state related to HIT, would keep plt count >50K in presence of argatroban  08/08: stable and above 50K, awaiting serotonin assay    Endocrine  Hyperglycemia  ISS q 4hr  Possibly d/t steriods that were received at OSH , as HgbA1c is 5.1   Serum glucose increasing today,08/06 and is set to start peptamen intense TFs, start insulin gtt, will reassess when steroid course is completed in am  08/07: off insulin gtt, scheduled detmir/aspartate  08/08: glucose levels between 120 and 200 on current TF/insulin regimen    Other  MSSA bacteremia  vanc d/c'd, placed on continuous oxacillin and cipro  Follow up repeat blood cultures, TTE ordered  08/08: last positive culture on the 5th          The patient is being Prophylaxed for:  Venous Thromboembolism with: Chemical  Stress Ulcer with: PPI  Ventilator Pneumonia with: chlorhexidine oral care    Activity Orders          Diet NPO: NPO starting at 08/05 0002      CRC 35 min  Full Code    Anson Matthews MD  Neurocritical Care  [facility] WT

## 2020-08-08 NOTE — PROGRESS NOTES
"[facility] - ICU 16 WT  Infectious Disease  Progress Note    Patient Name: Atul Mendoza  MRN: [id number]  Admission Date: 8/4/2020  Length of Stay: 4 days  Attending Physician: Anson Almanzar MD  Primary Care Provider: Venice Hernandez MD    Isolation Status: Airborne and Contact and Droplet  Assessment/Plan:      * Acute encephalopathy  Not significantly improved.   · Pending CT scan.    Pneumonia  Pt with hx of COVID-19 viral pneumonia and respiratory failure with concern for superimposed bacterial infection. Sputum cultures initially with E coli and MSSA. Repeat sputum samples with E coli.   Continue Cipro. Will plan for a 7 day course from clearance of bacteremia.     MSSA bacteremia  Afebrile and without leukocytosis. Concerned for endocarditis due to multiple areas of embolization on brain imaging while actively bacteremic. Repeat blood cultures on 8/5 with E coli.   · Continue oxacillin.   · Repeat blood cultures today.   · Recommend SYLVIA if clinically safe to do so.   · If unable to perform SYLVIA then would treat with long term antibiotics (4-6 weeks).     E coli bacteremia  Likely from respiratory translocation.   · Cipro as above.   · Consider exchanging central lines.       Anticipated Disposition: per primary    Thank you for your consult. I will follow-up with patient. Please contact us if you have any additional questions.    Temitope Pride MD  Infectious Disease  [facility] - ICU 16 WT    Subjective:     Principal Problem:Acute encephalopathy    HPI: Mr. Mendoza is a 68y/o M pt with PMHx of etoh abuse, HTN who initially presented to OSH for 2 day hx of "shaking", tested positive for COVID-19 and was admitted for viral pneumonia, transferred to [facility] on 8/4 for EEG as part of acute encephalopathy workup. Hospital course complicated by NSTEMI and high suspicion for PE for which he was started on ACS/PE protocol. Hep switched to argatroban due to concern for HIT, but HIT " "panel has now resulted neg. Also with AMS and worsening respiratory status requiring intubation and mechanical ventilation with subsequent need for pressor support. Had witnessed myoclonic jerking concerning for seizure activity. CTH concerning for acute infarcts. BCx 8/2 and 8/3 grew MSSA and Resp Cx 8/2 grew MSSA and e.coli. Pt started on vanc and zosyn on 8/2. TTE 8/3 without evidence of vegetations or HF. ID consulted on 8/4 and abx tailored to vanc and ceftriaxone. MRI 8/5 with "Scattered areas of acute infarction supratentorial and infratentorially in the anterior and posterior circulation concerning for cardioembolic etiology." US (8/5) with Lt popliteal and Rt subclavian vein DVT and concern for portal vein thrombosis (8/3).     ID consulted for MSSA bacteremia. Pt remains intubated. EEG pending.    Interval History: Patient transferred on 8/5 for NCC evaluation of encephalopathy. Found to have MSSA bacteremia and possible MSSA plus E coli bibasilar pneumonia. Work up revealed scattered areas of cerebral infarction. Concern from ID standpoint for endocarditis with septic showering. Afebrile and without leukocytosis. Transitioned to Cipro plus oxacillin. Blood cultures 8/5 with E coli. Blood cultures 8/8 in process.     Review of Systems   Unable to perform ROS: Intubated     Objective:     Vital Signs (Most Recent):  Temp: 97.8 °F (36.6 °C) (08/08/20 0400)  Pulse: 91 (08/08/20 0600)  Resp: 19 (08/08/20 0600)  BP: 114/68 (08/08/20 0600)  SpO2: 96 % (08/08/20 0600) Vital Signs (24h Range):  Temp:  [93.2 °F (34 °C)-97.8 °F (36.6 °C)] 97.8 °F (36.6 °C)  Pulse:  [73-96] 91  Resp:  [16-41] 19  SpO2:  [93 %-100 %] 96 %  BP: (112-151)/(66-97) 114/68  Arterial Line BP: (122-175)/(59-79) 135/61     Weight: 76.5 kg (168 lb 10.4 oz)  Body mass index is 22.87 kg/m².    Estimated Creatinine Clearance: 38.8 mL/min (A) (based on SCr of 2 mg/dL (H)).    Physical Exam  Vitals signs and nursing note reviewed.   Constitutional: "       Appearance: He is well-developed.      Interventions: He is sedated and intubated.   Pulmonary:      Effort: Pulmonary effort is normal. He is intubated.   Skin:     General: Skin is warm and dry.   Neurological:      Mental Status: He is lethargic.         Significant Labs:   Blood Culture:   Recent Labs   Lab 08/02/20  0605 08/02/20  0615 08/03/20  0955 08/05/20  0141 08/05/20  0143   LABBLOO Gram stain katt bottle: Gram positive cocci in clusters resembling Staph   Results called to and read back by:Helen Klein RN 08/03/2020  05:47  Gram stain aer bottle: Gram positive cocci in clusters resembling Staph   08/03/2020  10:08  STAPHYLOCOCCUS AUREUS  ID consult required at [facility] locations.  *  COAGULASE-NEGATIVE STAPHYLOCOCCUS SPECIES  Organism is a probable contaminant  * No growth after 5 days. Gram stain aer bottle: Gram positive cocci in clusters resembling Staph  Results called to and read back by: Rola Fang RN. 08/04/2020    16:58  STAPHYLOCOCCUS AUREUS  ID consult required at [facility] and [facility] locations.  * Gram stain aer bottle: Gram negative rods  Results called to and read back by:Shea Berry RN 08/07/2020  16:51 No Growth to date  No Growth to date  No Growth to date  No Growth to date     BMP:   Recent Labs   Lab 08/08/20  0415   *  169*   *  148*   K 2.7*  2.7*   *  119*   CO2 21*  21*   BUN 72*  72*   CREATININE 2.0*  2.0*   CALCIUM 7.2*  7.2*   MG 2.4     CBC:   Recent Labs   Lab 08/06/20  1611 08/07/20  0057 08/08/20  0415   WBC 6.35 6.28 7.30   HGB 11.1* 10.3* 11.0*   HCT 33.4* 31.0* 32.6*   PLT 31* 51* 52*     CSF:   Recent Labs   Lab 08/04/20  0924   CSFCULTURE No Growth to date     Urine Culture:   Recent Labs   Lab 08/02/20  2039 08/05/20  0116   LABURIN No growth No significant growth     Urine Studies:   Recent Labs   Lab 07/15/20  1336 07/20/20  0700  08/05/20  0116   COLORU Yellow Yellow   < > Yellow    APPEARANCEUA Clear Hazy*   < > Hazy*   PHUR 7.0 5.0   < > 5.0   SPECGRAV 1.020 1.010   < > 1.015   PROTEINUA Trace* Negative   < > Negative   GLUCUA Negative Negative   < > 1+*   KETONESU Negative Negative   < > Negative   BILIRUBINUA Negative Negative   < > Negative   OCCULTUA 2+* 2+*   < > 3+*   NITRITE Negative Negative   < > Negative   UROBILINOGEN Negative  --   --   --    LEUKOCYTESUR Negative Negative   < > 3+*   RBCUA 10* 0   < > 32*   WBCUA 0 0   < > 47*   BACTERIA Rare Rare  --  Occasional   SQUAMEPITHEL  --  0   < > 1   HYALINECASTS  --  1  --   --     < > = values in this interval not displayed.       Significant Imaging: I have reviewed all pertinent imaging results/findings within the past 24 hours.

## 2020-08-08 NOTE — ASSESSMENT & PLAN NOTE
Etiology again remains unclear  Although suspicion of prothrombotic state related to HIT, would keep plt count >50K in presence of argatroban  08/08: stable and above 50K, awaiting serotonin assay

## 2020-08-08 NOTE — ASSESSMENT & PLAN NOTE
vanc d/c'd, placed on continuous oxacillin and cipro  Follow up repeat blood cultures, TTE ordered  08/08: last positive culture on the 5th

## 2020-08-08 NOTE — SUBJECTIVE & OBJECTIVE
Review of Systems   Unable to perform ROS: Intubated     Objective:     Vitals:  Temp: 97.8 °F (36.6 °C)  Pulse: 93  Rhythm: sinus tachycardia  BP: 114/68  MAP (mmHg): 86  CVP (mean): 9 mmHg  Resp: (!) 25  SpO2: 96 %  Oxygen Concentration (%): 35  O2 Device (Oxygen Therapy): ventilator  Vent Mode: A/C  Set Rate: 12 BPM  Vt Set: 550 mL  PEEP/CPAP: 5 cmH20  Peak Airway Pressure: 40 cmH2O  Mean Airway Pressure: 14 cmH20  Plateau Pressure: 24 cmH20    Temp  Min: 93.2 °F (34 °C)  Max: 97.8 °F (36.6 °C)  Pulse  Min: 74  Max: 96  BP  Min: 112/67  Max: 151/97  MAP (mmHg)  Min: 84  Max: 119  CVP (mean)  Min: 3 mmHg  Max: 13 mmHg  Resp  Min: 16  Max: 41  SpO2  Min: 93 %  Max: 98 %  Oxygen Concentration (%)  Min: 35  Max: 35    08/07 0701 - 08/08 0700  In: 1728.8 [I.V.:78.8]  Out: 1900 [Urine:1850]   Unmeasured Output  Urine Occurrence: 1  Stool Occurrence: 1  Emesis Occurrence: 0  Pad Count: 1       Physical Exam  Constitutional:       General: He is not in acute distress.  HENT:      Head: Normocephalic.   Eyes:      Pupils: Pupils are equal, round, and reactive to light.   Neck:      Musculoskeletal: Neck supple.   Cardiovascular:      Rate and Rhythm: Regular rhythm.      Pulses: Normal pulses.   Pulmonary:      Breath sounds: Normal breath sounds.   Abdominal:      General: Abdomen is flat.      Palpations: Abdomen is soft.   Musculoskeletal:         General: Swelling present.   Skin:     General: Skin is warm.      Capillary Refill: Capillary refill takes less than 2 seconds.   Neurological:      Comments: Improved level of alertness although still requires large amount of physical stimulation to open eyes  Withdrawal response present again mostly on left with widened palpebral fissure on right           Medications:  Continuousargatroban in 0.9 % sod chlor, Last Rate: 0.349 mcg/kg/min (08/08/20 0232)  propofoL, Last Rate: 5 mcg/kg/min (08/07/20 1800)    Scheduledalbuterol-ipratropium, 3 mL, Q6H  atorvastatin, 40 mg,  Daily  ciprofloxacin HCl, 500 mg, Q12H  famotidine, 20 mg, Daily  insulin aspart U-100, 4 Units, Q4H  insulin detemir U-100, 10 Units, BID  lactulose, 15 g, TID  oxacillin 12 g in  mL CONTINUOUS INFUSION, 12 g, Q24H    PRNsodium chloride, , Q24H PRN  acetaminophen, 650 mg, Q6H PRN  dextrose 50%, 12.5 g, PRN  fentaNYL, 12.5 mcg, Q2H PRN  glucagon (human recombinant), 1 mg, PRN  insulin aspart U-100, 1-10 Units, Q4H PRN  ondansetron, 4 mg, Q8H PRN  sodium chloride 0.9%, 10 mL, PRN      Today I personally reviewed pertinent medications, lines/drains/airways, imaging, cardiology results, laboratory results, microbiology results, notably:    Diet  Diet NPO  Diet NPO

## 2020-08-08 NOTE — ASSESSMENT & PLAN NOTE
Multifactorial, main component for decline in LOC is non titrating propofol  Hold sedation, may restart titration to RASS -1 to -2  08/08: currently has findings suggesting a right hemiparesis, repeat head ct

## 2020-08-08 NOTE — ASSESSMENT & PLAN NOTE
Pt with hx of COVID-19 viral pneumonia and respiratory failure with concern for superimposed bacterial infection. Sputum cultures initially with E coli and MSSA. Repeat sputum samples with E coli.   Continue Cipro. Will plan for a 7 day course from clearance of bacteremia.

## 2020-08-08 NOTE — ASSESSMENT & PLAN NOTE
CRT 4.3, GFR 15.4  K < 5, no metabolic acidosis noted on ABG  Fio2 needs Are minimal, given COVID19   Follow I/O, making urine  Consider nephrology consult if acute changes  08/06: improving, making urine, cont ivfs  08/08: continues to improve, requires diuresis, follow uop, hold ivfs

## 2020-08-08 NOTE — ASSESSMENT & PLAN NOTE
Elevated lactic prior to arrival, will trend  Broad spec Abx continued form OSH  Pan Cx  Off Levophed for BP support  Appreciate ID imput  08/08: remains with nl white count and fever curve

## 2020-08-08 NOTE — SUBJECTIVE & OBJECTIVE
Interval History: Patient transferred on 8/5 for NCC evaluation of encephalopathy. Found to have MSSA bacteremia and possible MSSA plus E coli bibasilar pneumonia. Work up revealed scattered areas of cerebral infarction. Concern from ID standpoint for endocarditis with septic showering. Afebrile and without leukocytosis. Transitioned to Cipro plus oxacillin. Blood cultures 8/5 with E coli. Blood cultures 8/8 in process.     Review of Systems   Unable to perform ROS: Intubated     Objective:     Vital Signs (Most Recent):  Temp: 97.8 °F (36.6 °C) (08/08/20 0400)  Pulse: 91 (08/08/20 0600)  Resp: 19 (08/08/20 0600)  BP: 114/68 (08/08/20 0600)  SpO2: 96 % (08/08/20 0600) Vital Signs (24h Range):  Temp:  [93.2 °F (34 °C)-97.8 °F (36.6 °C)] 97.8 °F (36.6 °C)  Pulse:  [73-96] 91  Resp:  [16-41] 19  SpO2:  [93 %-100 %] 96 %  BP: (112-151)/(66-97) 114/68  Arterial Line BP: (122-175)/(59-79) 135/61     Weight: 76.5 kg (168 lb 10.4 oz)  Body mass index is 22.87 kg/m².    Estimated Creatinine Clearance: 38.8 mL/min (A) (based on SCr of 2 mg/dL (H)).    Physical Exam  Vitals signs and nursing note reviewed.   Constitutional:       Appearance: He is well-developed.      Interventions: He is sedated and intubated.   Pulmonary:      Effort: Pulmonary effort is normal. He is intubated.   Skin:     General: Skin is warm and dry.   Neurological:      Mental Status: He is lethargic.         Significant Labs:   Blood Culture:   Recent Labs   Lab 08/02/20  0605 08/02/20  0615 08/03/20  0955 08/05/20  0141 08/05/20  0143   LABBLOO Gram stain katt bottle: Gram positive cocci in clusters resembling Staph   Results called to and read back by:Helen Klein RN 08/03/2020  05:47  Gram stain aer bottle: Gram positive cocci in clusters resembling Staph   08/03/2020  10:08  STAPHYLOCOCCUS AUREUS  [facility]  *  COAGULASE-NEGATIVE STAPHYLOCOCCUS SPECIES  Organism is a probable contaminant  *  No growth after 5 days. Gram stain aer bottle: Gram positive cocci in clusters resembling Staph  Results called to and read back by: Rola Fang RN. 08/04/2020    16:58  STAPHYLOCOCCUS AUREUS  ID consult required at [facility] [facility] [place] locations.  * Gram stain aer bottle: Gram negative rods  Results called to and read back by:Shea Berry RN 08/07/2020  16:51 No Growth to date  No Growth to date  No Growth to date  No Growth to date     BMP:   Recent Labs   Lab 08/08/20  0415   *  169*   *  148*   K 2.7*  2.7*   *  119*   CO2 21*  21*   BUN 72*  72*   CREATININE 2.0*  2.0*   CALCIUM 7.2*  7.2*   MG 2.4     CBC:   Recent Labs   Lab 08/06/20  1611 08/07/20  0057 08/08/20  0415   WBC 6.35 6.28 7.30   HGB 11.1* 10.3* 11.0*   HCT 33.4* 31.0* 32.6*   PLT 31* 51* 52*     CSF:   Recent Labs   Lab 08/04/20  0924   CSFCULTURE No Growth to date     Urine Culture:   Recent Labs   Lab 08/02/20  2039 08/05/20  0116   LABURIN No growth No significant growth     Urine Studies:   Recent Labs   Lab 07/15/20  1336 07/20/20  0700  08/05/20  0116   COLORU Yellow Yellow   < > Yellow   APPEARANCEUA Clear Hazy*   < > Hazy*   PHUR 7.0 5.0   < > 5.0   SPECGRAV 1.020 1.010   < > 1.015   PROTEINUA Trace* Negative   < > Negative   GLUCUA Negative Negative   < > 1+*   KETONESU Negative Negative   < > Negative   BILIRUBINUA Negative Negative   < > Negative   OCCULTUA 2+* 2+*   < > 3+*   NITRITE Negative Negative   < > Negative   UROBILINOGEN Negative  --   --   --    LEUKOCYTESUR Negative Negative   < > 3+*   RBCUA 10* 0   < > 32*   WBCUA 0 0   < > 47*   BACTERIA Rare Rare  --  Occasional   SQUAMEPITHEL  --  0   < > 1   HYALINECASTS  --  1  --   --     < > = values in this interval not displayed.       Significant Imaging: I have reviewed all pertinent imaging results/findings within the past 24 hours.

## 2020-08-08 NOTE — ASSESSMENT & PLAN NOTE
Chief Complaint   Patient presents with   • Derm Problem     1 year follow up acne   • Derm Problem     warts on right fourth finger       HISTORY OF PRESENT ILLNESS:     The patient is a 19 year old male who goes by the name Beto.    The patient has consent on file signed by a parent to be seen and treated without a parent present.    The patient presents in 1 year follow up for acne.    Location:  The acne involves face and chest   Duration:  The acne started at age 15  Quality:     The patient develops painful acne lesions-Yes  Modifying factors:     Current treatments for acne include: nothing  The patient was using Differin 0.3% gel for a few months after his last visit but then stopped as he noticed the acne clearing. He has not used the topical medication since then.    The patient washes his face at least one time a day with soap  .      The patient's control of acne today is:        [x]  Better than an average day                []   An average day                 []   Worse than an average day       Additionally, the patient comes in for warts on the right fourth finger. These were treated with liquid nitrogen at last visit on 8/21/2017. The patient does not have any pain with the warts and has not using any over the counter treatments.   He is requesting the warts to be treated with liquid nitrogen.      REVIEW OF SYSTEMS:  Constitutional:  In general, the patient feels well:  Yes  Integument:  The patient denies any other signs or symptoms of skin disease:  Yes    The patient is pregnant:           [] Yes   [] No    [x] Not applicable.  The patient is breastfeeding:  [] Yes   [] No    [x] Not applicable.    ALLERGIES:   Allergen Reactions   • Seasonal Other (See Comments)     Congestion and stuffy nose       Current Outpatient Prescriptions   Medication Sig   • methylpheniDATE ER (CONCERTA) 18 MG CR tablet Take 1 tablet by mouth every morning.   • pantoprazole (PROTONIX) 40 MG tablet Take 1 tablet by  Likely from respiratory translocation.   · Cipro as above.   · Consider exchanging central lines.    mouth daily.   • polyethylene glycol (MIRALAX) powder Take 17 g by mouth daily.   • albuterol 108 (90 BASE) MCG/ACT inhaler Inhale 2 puffs into the lungs 4 times daily as needed for Shortness of Breath or Wheezing.   • polyethylene glycol (MIRALAX) powder Take 17 g by mouth daily. (Patient taking differently: Take 17 g by mouth daily as needed. )   • cetirizine (ZYRTEC ALLERGY) 10 MG tablet Take 1 tablet by mouth daily. (Patient taking differently: Take 10 mg by mouth daily as needed. )   • fluticasone (FLONASE) 50 MCG/ACT nasal spray Spray 1 spray in each nostril 2 times daily. (Patient taking differently: Spray 1 spray in each nostril 2 times daily as needed. )   • adapalene (DIFFERIN) 0.3 % gel Apply each night  to acne areas of face.     No current facility-administered medications for this visit.        PAST MEDICAL HISTORY:      Chronic allergic conjunctivitis                               Asthma                                                        ADHD (attention deficit hyperactivity disorder)               PTSD (post-traumatic stress disorder)                         OCD (obsessive compulsive disorder)                           Anxiety                                                       Eczema                                                        GERD (gastroesophageal reflux disease)                          Comment: MORE WHEN YOUNGER      DERMATOLOGY PAST MEDICAL HISTORY:      Acne vulgaris treated with Differin 0.3% gel     Warts treated with liquid nitrogen.        FAMILY HISTORY:  The patient has a family history of severe acne:  No    PHYSICAL EXAMINATION:    On the face, back and chest - scattered pink papules and scattered comedones (Acne)    Right fourth finger there are three verrucous papules ranging in size from 2-3 mm (Warts)    No other significant findings on detailed SKIN EXAMINATION FROM THE WAIST UP  which is a DETAILED EXAMINATION  including palpation and inspection of the scalp and  hair, and inspection of the head and face, eyelids, lips, neck, chest (including breasts and underarms-including eccrine and apocrine glands), abdomen, back and right and left upper extremities.  The patient is well nourished and well developed.    The patient was seen and examined by Rekha Caldwell MD.  in the presence of my medical assistant  Tania Mcdermott .  This office visit note was in part created by Tania Mcdermott acting also as a scribe for Rekha Caldwell MD.    Rekha Caldwell MD.   reviewed the note for accuracy and completeness before signing.        Assessment and Plan:    1.  Acne    I prescribed   Minocycline 100 mg to take one by mouth twice a day with food.   Dispense 60 with 3  refills We gave the patient a handout on the risks and benefits of the medication and went over the information verbally with the patient.    I prescribed  Duac gel. Apply each night to the acne areas of the face, chest and back. Refills 2.    We explained this medication can cause bleaching of fabrics therefore I instructed the patient to purchase white pillow cases, towels and wash cloths and wash these linens only with other white laundry.  We put the following message to the pharmacist on the prescription:    Okay to substitute clindamycin1% lot/solution (60mL)and BPO 5% gel (45g or closest) in the cheapest forms available to the patient.(ie-is OTC BPO cheaper?)    I recommend the patient wash his face in the morning and again in the evening right before going to bed, with Cetaphil cleanser.  Discussed using moisturizer with sunscreen daily.  Gentle skin care reviewed, no picking, no scrubbing and no popping of acne to reduce risk of scarring.    Return to clinic 2 months      2. Warts x  2    The viral nature of warts was discussed.  Various treatment options were discussed and I recommended liquid Nitrogen therapy be performed on a regular basis until the warts are gone.  After discussion of the risks and  benefits of liquid Nitrogen therapy, oral consent was obtained.  Therefore, the areas were frozen with liquid Nitrogen.    I recommended treatment with over-the-counter 17% salicylic acid liquid.  Apply to the warts.   One day later soak the warts in warm water for 5 minutes and then pare the warts with a nail file or pumice stone.  Repeat this process daily until the warts have resolved.  We gave them a handout on using this over the counter medication.  Do not use on the face.      Return to clinic 2 months          On 8/30/2018, ITania scribed the services personally performed by Rekha Caldwell MD  The documentation recorded by the scribe accurately and completely reflects the service(s) I personally performed and the decisions made by me.

## 2020-08-08 NOTE — ASSESSMENT & PLAN NOTE
ISS q 4hr  Possibly d/t steriods that were received at OSH , as HgbA1c is 5.1   Serum glucose increasing today,08/06 and is set to start peptamen intense TFs, start insulin gtt, will reassess when steroid course is completed in am  08/07: off insulin gtt, scheduled detmir/aspartate  08/08: glucose levels between 120 and 200 on current TF/insulin regimen

## 2020-08-08 NOTE — ASSESSMENT & PLAN NOTE
Afebrile and without leukocytosis. Concerned for endocarditis due to multiple areas of embolization on brain imaging while actively bacteremic. Repeat blood cultures on 8/5 with E coli.   · Continue oxacillin.   · Repeat blood cultures today.   · Recommend SYLVIA if clinically safe to do so.   · If unable to perform SYLVIA then would treat with long term antibiotics (4-6 weeks).

## 2020-08-08 NOTE — ASSESSMENT & PLAN NOTE
transferred to [facility] [place] to eval for NCSE  Keppra and propofol started at OSH  cEEG pending  08/07: only slowing seen, wean off propofol so exam can be followed, reduce keppra to 500mg bid  08/08: not awakening, may need repeat EEG if CT head without change

## 2020-08-09 NOTE — SUBJECTIVE & OBJECTIVE
Interval History: Patient transferred on 8/5 for NCC evaluation of encephalopathy. Found to have MSSA bacteremia and possible MSSA plus E coli bibasilar pneumonia. Work up revealed scattered areas of cerebral infarction. Concern from ID standpoint for endocarditis with septic showering. Afebrile and without leukocytosis. Transitioned to Cipro plus oxacillin. Blood cultures 8/5 with E coli. Blood cultures 8/8 NGTD.      Review of Systems   Unable to perform ROS: Intubated     Objective:     Vital Signs (Most Recent):  Temp: 97 °F (36.1 °C) (08/09/20 1130)  Pulse: 92 (08/09/20 1304)  Resp: 20 (08/09/20 1304)  BP: 138/72 (08/09/20 1300)  SpO2: 96 % (08/09/20 1304) Vital Signs (24h Range):  Temp:  [96.7 °F (35.9 °C)-97.5 °F (36.4 °C)] 97 °F (36.1 °C)  Pulse:  [] 92  Resp:  [18-27] 20  SpO2:  [91 %-98 %] 96 %  BP: (109-146)/(69-88) 138/72  Arterial Line BP: (108-162)/(47-75) 113/51     Weight: 76.5 kg (168 lb 10.4 oz)  Body mass index is 22.87 kg/m².    Estimated Creatinine Clearance: 33.7 mL/min (A) (based on SCr of 2.3 mg/dL (H)).    Physical Exam  Vitals signs and nursing note reviewed.   Constitutional:       Appearance: He is well-developed.      Interventions: He is sedated and intubated.   Pulmonary:      Effort: Pulmonary effort is normal. He is intubated.   Skin:     General: Skin is warm and dry.   Neurological:      Mental Status: He is lethargic.         Significant Labs:   Blood Culture:   Recent Labs   Lab 08/02/20  0615 08/03/20  0955 08/05/20  0141 08/05/20  0143 08/08/20  0734   LABBLOO No growth after 5 days. Gram stain aer bottle: Gram positive cocci in clusters resembling Staph  Results called to and read back by: Rola Fang RN. 08/04/2020    16:58  STAPHYLOCOCCUS AUREUS  ID consult required at [facility] [facility].  * Gram stain aer bottle: Gram negative rods  Results called to and read back by:Shea Berry RN 08/07/2020  16:51  ESCHERICHIA COLI* No Growth to  date  No Growth to date  No Growth to date  No Growth to date  No Growth to date No Growth to date  No Growth to date     BMP:   Recent Labs   Lab 08/09/20  0409      *   K 3.0*   *   CO2 22*   BUN 77*   CREATININE 2.3*   CALCIUM 7.7*   MG 2.4     CBC:   Recent Labs   Lab 08/08/20  0415 08/09/20  0409   WBC 7.30 11.26   HGB 11.0* 11.5*   HCT 32.6* 33.9*   PLT 52* 40*     CSF:   Recent Labs   Lab 08/04/20  0924   CSFCULTURE No Growth     Urine Culture:   Recent Labs   Lab 08/02/20 2039 08/05/20  0116   LABURIN No growth No significant growth     Urine Studies:   Recent Labs   Lab 07/15/20  1336 07/20/20  0700  08/05/20  0116   COLORU Yellow Yellow   < > Yellow   APPEARANCEUA Clear Hazy*   < > Hazy*   PHUR 7.0 5.0   < > 5.0   SPECGRAV 1.020 1.010   < > 1.015   PROTEINUA Trace* Negative   < > Negative   GLUCUA Negative Negative   < > 1+*   KETONESU Negative Negative   < > Negative   BILIRUBINUA Negative Negative   < > Negative   OCCULTUA 2+* 2+*   < > 3+*   NITRITE Negative Negative   < > Negative   UROBILINOGEN Negative  --   --   --    LEUKOCYTESUR Negative Negative   < > 3+*   RBCUA 10* 0   < > 32*   WBCUA 0 0   < > 47*   BACTERIA Rare Rare  --  Occasional   SQUAMEPITHEL  --  0   < > 1   HYALINECASTS  --  1  --   --     < > = values in this interval not displayed.       Significant Imaging: I have reviewed all pertinent imaging results/findings within the past 24 hours.

## 2020-08-09 NOTE — ASSESSMENT & PLAN NOTE
CRT 4.3, GFR 15.4  K < 5, no metabolic acidosis noted on ABG  Fio2 needs Are minimal, given COVID19   Follow I/O, making urine  Consider nephrology consult if acute changes  08/06: improving, making urine, cont ivfs  08/08: continues to improve, requires diuresis, follow uop, hold ivfs  08/09: adequate response to lasix dose yesterday, CVP down to 2-3

## 2020-08-09 NOTE — ASSESSMENT & PLAN NOTE
Pt with hx of COVID-19 viral pneumonia and respiratory failure with concern for superimposed bacterial infection. Sputum cultures initially with E coli and MSSA. Repeat sputum samples with E coli.   · Continue Cipro.   · Treat for a total of 7 days.  End date: 8/14/20.

## 2020-08-09 NOTE — ASSESSMENT & PLAN NOTE
emergently intubated after declined while on floor in OSH  COVID19 positive  CXR/ABG daily  Will assess and wean vent as able  Fio2 40, peep 5 at this time.  CXR impoving, can begin SBT when awakens   08/07: SBT trial  08/09: repeat SBT after adequate diuresis

## 2020-08-09 NOTE — PROGRESS NOTES
"[facility] - ICU 16 WT  Infectious Disease  Progress Note    Patient Name: Atul Mendoza  MRN: [id number]  Admission Date: 8/4/2020  Length of Stay: 5 days  Attending Physician: Anson Almanzar MD  Primary Care Provider: Venice Hernandez MD    Isolation Status: Airborne and Contact and Droplet  Assessment/Plan:      * Acute encephalopathy  Not significantly improved.   · Defer to primary service.    Pneumonia  Pt with hx of COVID-19 viral pneumonia and respiratory failure with concern for superimposed bacterial infection. Sputum cultures initially with E coli and MSSA. Repeat sputum samples with E coli.   · Continue Cipro.   · Treat for a total of 7 days.  End date: 8/14/20.     MSSA bacteremia  Afebrile and without leukocytosis. Concerned for endocarditis due to multiple areas of embolization on brain imaging while actively bacteremic. Repeat blood cultures on 8/5 with E coli.   · Continue oxacillin.   · Repeat blood cultures today.   · Recommend SYLVIA if clinically safe to do so.   · If unable to perform SYLVIA then would treat with long term antibiotics (EOC 9/16/20-6 weeks).   · Will sign off for now. Please re-consult once closer to discharge to finalize antibiotic plan.       Anticipated Disposition: per primary    Thank you for your consult. I will sign off. Please contact us if you have any additional questions.    Temitope Pride MD  Infectious Disease  [facility] - ICU 16 WT    Subjective:     Principal Problem:Acute encephalopathy    HPI: Mr. Mendoza is a 68y/o M pt with PMHx of etoh abuse, HTN who initially presented to OSH for 2 day hx of "shaking", tested positive for COVID-19 and was admitted for viral pneumonia, transferred to [facility] on 8/4 for EEG as part of acute encephalopathy workup. Hospital course complicated by NSTEMI and high suspicion for PE for which he was started on ACS/PE protocol. Hep switched to argatroban due to concern for HIT, but HIT panel has now resulted " "neg. Also with AMS and worsening respiratory status requiring intubation and mechanical ventilation with subsequent need for pressor support. Had witnessed myoclonic jerking concerning for seizure activity. CTH concerning for acute infarcts. BCx 8/2 and 8/3 grew MSSA and Resp Cx 8/2 grew MSSA and e.coli. Pt started on vanc and zosyn on 8/2. TTE 8/3 without evidence of vegetations or HF. ID consulted on 8/4 and abx tailored to vanc and ceftriaxone. MRI 8/5 with "Scattered areas of acute infarction supratentorial and infratentorially in the anterior and posterior circulation concerning for cardioembolic etiology." US (8/5) with Lt popliteal and Rt subclavian vein DVT and concern for portal vein thrombosis (8/3).     ID consulted for MSSA bacteremia. Pt remains intubated. EEG pending.    Interval History: Patient transferred on 8/5 for NCC evaluation of encephalopathy. Found to have MSSA bacteremia and possible MSSA plus E coli bibasilar pneumonia. Work up revealed scattered areas of cerebral infarction. Concern from ID standpoint for endocarditis with septic showering. Afebrile and without leukocytosis. Transitioned to Cipro plus oxacillin. Blood cultures 8/5 with E coli. Blood cultures 8/8 NGTD.      Review of Systems   Unable to perform ROS: Intubated     Objective:     Vital Signs (Most Recent):  Temp: 97 °F (36.1 °C) (08/09/20 1130)  Pulse: 92 (08/09/20 1304)  Resp: 20 (08/09/20 1304)  BP: 138/72 (08/09/20 1300)  SpO2: 96 % (08/09/20 1304) Vital Signs (24h Range):  Temp:  [96.7 °F (35.9 °C)-97.5 °F (36.4 °C)] 97 °F (36.1 °C)  Pulse:  [] 92  Resp:  [18-27] 20  SpO2:  [91 %-98 %] 96 %  BP: (109-146)/(69-88) 138/72  Arterial Line BP: (108-162)/(47-75) 113/51     Weight: 76.5 kg (168 lb 10.4 oz)  Body mass index is 22.87 kg/m².    Estimated Creatinine Clearance: 33.7 mL/min (A) (based on SCr of 2.3 mg/dL (H)).    Physical Exam  Vitals signs and nursing note reviewed.   Constitutional:       Appearance: He is " well-developed.      Interventions: He is sedated and intubated.   Pulmonary:      Effort: Pulmonary effort is normal. He is intubated.   Skin:     General: Skin is warm and dry.   Neurological:      Mental Status: He is lethargic.         Significant Labs:   Blood Culture:   Recent Labs   Lab 08/02/20  0615 08/03/20  0955 08/05/20  0141 08/05/20  0143 08/08/20  0734   LABBLOO No growth after 5 days. Gram stain aer bottle: Gram positive cocci in clusters resembling Staph  Results called to and read back by: Rola Fang RN. 08/04/2020    16:58  STAPHYLOCOCCUS AUREUS  ID consult required at [facility] and [facility] locations.  * Gram stain aer bottle: Gram negative rods  Results called to and read back by:Shea Berry RN 08/07/2020  16:51  ESCHERICHIA COLI* No Growth to date  No Growth to date  No Growth to date  No Growth to date  No Growth to date No Growth to date  No Growth to date     BMP:   Recent Labs   Lab 08/09/20  0409      *   K 3.0*   *   CO2 22*   BUN 77*   CREATININE 2.3*   CALCIUM 7.7*   MG 2.4     CBC:   Recent Labs   Lab 08/08/20  0415 08/09/20  0409   WBC 7.30 11.26   HGB 11.0* 11.5*   HCT 32.6* 33.9*   PLT 52* 40*     CSF:   Recent Labs   Lab 08/04/20  0924   CSFCULTURE No Growth     Urine Culture:   Recent Labs   Lab 08/02/20 2039 08/05/20  0116   LABURIN No growth No significant growth     Urine Studies:   Recent Labs   Lab 07/15/20  1336 07/20/20  0700  08/05/20  0116   COLORU Yellow Yellow   < > Yellow   APPEARANCEUA Clear Hazy*   < > Hazy*   PHUR 7.0 5.0   < > 5.0   SPECGRAV 1.020 1.010   < > 1.015   PROTEINUA Trace* Negative   < > Negative   GLUCUA Negative Negative   < > 1+*   KETONESU Negative Negative   < > Negative   BILIRUBINUA Negative Negative   < > Negative   OCCULTUA 2+* 2+*   < > 3+*   NITRITE Negative Negative   < > Negative   UROBILINOGEN Negative  --   --   --    LEUKOCYTESUR Negative Negative   < > 3+*   RBCUA 10* 0   < > 32*   WBCUA 0 0    < > 47*   BACTERIA Rare Rare  --  Occasional   SQUAMEPITHEL  --  0   < > 1   HYALINECASTS  --  1  --   --     < > = values in this interval not displayed.       Significant Imaging: I have reviewed all pertinent imaging results/findings within the past 24 hours.

## 2020-08-09 NOTE — ASSESSMENT & PLAN NOTE
Elevated lactic prior to arrival, will trend  Broad spec Abx continued form OSH  Pan Cx  Off Levophed for BP support  Appreciate ID [email or web address]  08/08: remains with nl white count and fever curve on continuous oxacillin and cipro

## 2020-08-09 NOTE — SUBJECTIVE & OBJECTIVE
Review of Systems   Unable to perform ROS: Intubated     Objective:     Vitals:  Temp: 97 °F (36.1 °C)  Pulse: 92  Rhythm: normal sinus rhythm  BP: 135/72  MAP (mmHg): 95  Resp: 20  SpO2: 96 %  Oxygen Concentration (%): 35  O2 Device (Oxygen Therapy): ventilator  Vent Mode: A/C  Set Rate: 12 BPM  Vt Set: 550 mL  PEEP/CPAP: 5 cmH20  Peak Airway Pressure: 29 cmH2O  Mean Airway Pressure: 10 cmH20  Plateau Pressure: 18 cmH20    Temp  Min: 96.7 °F (35.9 °C)  Max: 97.5 °F (36.4 °C)  Pulse  Min: 81  Max: 118  BP  Min: 109/75  Max: 146/73  MAP (mmHg)  Min: 88  Max: 108  CVP (mean)  Min: 0 mmHg  Max: 15 mmHg  Resp  Min: 18  Max: 27  SpO2  Min: 91 %  Max: 100 %  Oxygen Concentration (%)  Min: 35  Max: 35    08/08 0701 - 08/09 0700  In: 1796.6 [I.V.:93.6]  Out: 3250 [Urine:2900]   Unmeasured Output  Urine Occurrence: 1  Stool Occurrence: 1  Emesis Occurrence: 0  Pad Count: 1       Physical Exam  Constitutional:       General: He is not in acute distress.  HENT:      Head: Normocephalic.   Eyes:      Pupils: Pupils are equal, round, and reactive to light.   Neck:      Musculoskeletal: Neck supple.   Cardiovascular:      Rate and Rhythm: Regular rhythm.      Pulses: Normal pulses.   Pulmonary:      Breath sounds: Normal breath sounds.   Abdominal:      General: Abdomen is flat.      Palpations: Abdomen is soft.   Musculoskeletal:         General: Swelling present.   Skin:     General: Skin is warm.      Capillary Refill: Capillary refill takes less than 2 seconds.   Neurological:      Comments: Improved level of alertness although still requires large amount of physical stimulation to open eyes  Withdrawal response present again mostly on left with widened palpebral fissure on right           Medications:  Continuousargatroban in 0.9 % sod chlor, Last Rate: 0.349 mcg/kg/min (08/09/20 1210)  propofoL, Last Rate: Stopped (08/08/20 1900)    Scheduledalbuterol-ipratropium, 3 mL, Q6H  atorvastatin, 40 mg, Daily  ciprofloxacin  HCl, 500 mg, Q12H  famotidine, 20 mg, Daily  insulin aspart U-100, 2 Units, Q4H  insulin detemir U-100, 10 Units, BID  lactulose, 15 g, TID  oxacillin 12 g in  mL CONTINUOUS INFUSION, 12 g, Q24H    PRNsodium chloride, , Q24H PRN  acetaminophen, 650 mg, Q6H PRN  dextrose 50%, 12.5 g, PRN  fentaNYL, 12.5 mcg, Q2H PRN  glucagon (human recombinant), 1 mg, PRN  insulin aspart U-100, 1-10 Units, Q4H PRN  ondansetron, 4 mg, Q8H PRN  sodium chloride 0.9%, 10 mL, PRN      Today I personally reviewed pertinent medications, lines/drains/airways, imaging, cardiology results, laboratory results, microbiology results, notably:    Diet  Diet NPO  Diet NPO

## 2020-08-09 NOTE — ASSESSMENT & PLAN NOTE
Afebrile and without leukocytosis. Concerned for endocarditis due to multiple areas of embolization on brain imaging while actively bacteremic. Repeat blood cultures on 8/5 with E coli.   · Continue oxacillin.   · Repeat blood cultures today.   · Recommend SYLVIA if clinically safe to do so.   · If unable to perform SYLVIA then would treat with long term antibiotics (EOC 9/16/20-6 weeks).   · Will sign off for now. Please re-consult once closer to discharge to finalize antibiotic plan.

## 2020-08-09 NOTE — ASSESSMENT & PLAN NOTE
Etiology again remains unclear  Although suspicion of prothrombotic state related to HIT, would keep plt count >50K in presence of argatroban  08/08: stable and above 50K, awaiting serotonin assay, cont argatroban for now

## 2020-08-09 NOTE — PROCEDURES
DATE OF STUDY:  8/8/20     EEG NUMBER:  [id number]     REFERRING PHYSICIAN:  Dr. Almanzar      This EEG was performed to assess for subclinical seizures.     ELECTROENCEPHALOGRAM REPORT     METHODOLOGY:  Electroencephalographic (EEG) is recorded with electrodes placed according to the International 10-20 placement system.  Thirty two (32) channels of digital signal (sampling rate of 512/sec), including T1 and T2, were simultaneously recorded from the scalp and may include EKG, EMG, and/or eye monitors.  Recording band pass was 0.1 to 512 Hz.  Digital video recording of the patient is simultaneously recorded with the EEG.  The patient is instructed to report clinical symptoms which may occur during the recording session.  EEG  and video recording are stored and archived in digital format.  Activation procedures, which include photic stimulation, hyperventilation and instructing patients to perform simple tasks, are done in selected patients.     The EEG is displayed on a monitor screen and can be reviewed using different montages.  Computer-assisted analysis is employed to detect spike and electrographic seizure activity.  The entire record is submitted for computer analysis.  The entire recording is visually reviewed, and the times identified by computer analysis as being spikes or seizures are reviewed again.     Compressed spectral analysis (CSA) is also performed on the activity recorded from each individual channel.  This is displayed as a power display of frequencies from 0 to 30 Hz over time.  The CSA is reviewed looking for asymmetries in power between homologous areas of the scalp, then compared with the original EEG recording.     [employer] software was also utilized in the review of this study.  This software suite analyzes the EEG recording in multiple domains.  Coherence and rhythmicity are computed to identify EEG sections which may contain organized seizures.  Each channel undergoes analysis to detect  the presence of spike and sharp waves which have special and morphological characteristics of epileptic activity.  The routine EEG recording is converted from special into frequency domain.  This is then displayed comparing homologous areas to identify areas of significant asymmetry. Algorithm to identify non-cortically generated artifact is used to separate artifact from the EEG.      Recording times  start on August 8, 2020 hr 21 min 37 sec 51  End on August 9, 2020 hr 9 min 12 sec 38  The total time of EEG recording for the study was 11 hrs 34 min     Portions of the record were obscured by artifacts related to electrodes Fp1, F3, Fz, O2    EEG FINDINGS:  The recording was obtained with a number of standard bipolar and referential montages during obtunded state.  During this state the background was diffusely disorganized and comprises of low amplitude mixed theta range activity which was synchronous and symmetric.  Overriding beta range activity was also noted. Spontaneous variability and reactivity were noted. No clear state changes were appreciated.  There were no interictal epileptiform abnormalities and no clinical or electrographic seizures were recorded.       The EKG channel revealed sinus rhythm.     IMPRESSION:  This is an abnormal EEG during obtunded state.  Diffuse disorganized slowing of the background was noted.     CLINICAL CORRELATION:  The patient is a 69-year-old male who is being evaluated for altered sensorium. The patient is currently not maintained on any antiseizure medication. This is an abnormal EEG during obtunded state.  Diffuse slowing of the background was noted suggestive of a moderate to severe encephalopathy likely secondary to a toxic metabolic process.  No seizures were recorded during this study.

## 2020-08-09 NOTE — PROGRESS NOTES
"[facility] - ICU 16 WT  Neurocritical Care  Progress Note    Admit Date: 8/4/2020  Service Date: 08/09/2020  Length of Stay: 5    Subjective:     Chief Complaint: Acute encephalopathy    History of Present Illness:  69-year-old AA male with a PMHx of HTN and alcohol abuse who presents as transfer fro [facility]. He originally presented to the [facility] ED due shaking for 2 days. Patient was seen in the ED at [facility] for similar compliant and was discharged home on Hydroxyzine without improvement in his tremulousness. Patient reported occasional alcohol drinking, however when asked to quantify, he reported drinking at least a case of 12-bottles of beer daily for over 20 year. Unable to confirm or deny any history of withdrawal. When asked why he's in the hospital, he stated "I need to be fixed up" but did not report any specific complaint. Unable to answer living situation. Denied any F/C, HA, visual changes, rhinitis, sputum production, anosmia, ageusia, CP, SOB, N/V, abdominal pain, bleeding (hemoptysis / hematemesis, hematuria, BRBPR), C/D, or changes in urinary habits. Denied any sick contacts or recent travel. Patient stated that he does not want to drink alcohol anymore. He was admitted there and durign work-up was covid positive. Overnight he became Tachycardic (150s) with hypoxia (~80% SpO2) on NRB with Tachypnea.  Elevated D-Dimer and Troponins with EKG changes. Placed on ACS/VTE tx for PE vs MI. BIPAP palced with improvement in hypoxia 90% SpO2. Patient placed in ICU for closer monitoring and respiratory support with concerns for pending respiratory failure in the setting of PE and NSTEMI.    In the Am, he appeared more altered, and was intubated d/t concerns of resp. Failure and encephalopathy. He was started on pressors for hypotension. During this time, body jerking was noted, which prompted a CTH which showed new infarcts. He was started on Keppra and propofol, and transferred to [facility] for NCSE " eval. There was alos concerns for sepsis,  So ABX started.                Hospital Course: 8/5/2020 Arrived [facility] Covid Unit, under NCC for eval of encephalopathy, r/o status  8/6/2020: renal function improving, although HIT antibody negative, high suspicion for  HIT remains, serotonin assay sent, white count normalizing on Abx, off pressors >24 hrs, wean sedation  8/7/20: renal function gradually improving, serotonin assay pending, plt count slightly improved, afebrile, nl white count, placed on psv- possible extubation today  8/8/20: CVP increased and having more difficulties oxygenating, start diuresis with 60mg iv lasix, 40mEQ potassium po(follow K closely while being diuresed, white count and temp nl, has not sufficiently awakened off sedation since yesterday- repeat head ct  8/9/20: neuro exam mostly unchanged from yesterday, off sedation, rpeat head ct unremarkable, awaiting EEG result, may need MRI, rpeat SBT today        Review of Systems   Unable to perform ROS: Intubated     Objective:     Vitals:  Temp: 97 °F (36.1 °C)  Pulse: 92  Rhythm: normal sinus rhythm  BP: 135/72  MAP (mmHg): 95  Resp: 20  SpO2: 96 %  Oxygen Concentration (%): 35  O2 Device (Oxygen Therapy): ventilator  Vent Mode: A/C  Set Rate: 12 BPM  Vt Set: 550 mL  PEEP/CPAP: 5 cmH20  Peak Airway Pressure: 29 cmH2O  Mean Airway Pressure: 10 cmH20  Plateau Pressure: 18 cmH20    Temp  Min: 96.7 °F (35.9 °C)  Max: 97.5 °F (36.4 °C)  Pulse  Min: 81  Max: 118  BP  Min: 109/75  Max: 146/73  MAP (mmHg)  Min: 88  Max: 108  CVP (mean)  Min: 0 mmHg  Max: 15 mmHg  Resp  Min: 18  Max: 27  SpO2  Min: 91 %  Max: 100 %  Oxygen Concentration (%)  Min: 35  Max: 35    08/08 0701 - 08/09 0700  In: 1796.6 [I.V.:93.6]  Out: 3250 [Urine:2900]   Unmeasured Output  Urine Occurrence: 1  Stool Occurrence: 1  Emesis Occurrence: 0  Pad Count: 1       Physical Exam  Constitutional:       General: He is not in acute distress.  HENT:      Head: Normocephalic.   Eyes:       Pupils: Pupils are equal, round, and reactive to light.   Neck:      Musculoskeletal: Neck supple.   Cardiovascular:      Rate and Rhythm: Regular rhythm.      Pulses: Normal pulses.   Pulmonary:      Breath sounds: Normal breath sounds.   Abdominal:      General: Abdomen is flat.      Palpations: Abdomen is soft.   Musculoskeletal:         General: Swelling present.   Skin:     General: Skin is warm.      Capillary Refill: Capillary refill takes less than 2 seconds.   Neurological:      Comments: Improved level of alertness although still requires large amount of physical stimulation to open eyes  Withdrawal response present again mostly on left with widened palpebral fissure on right           Medications:  Continuousargatroban in 0.9 % sod chlor, Last Rate: 0.349 mcg/kg/min (08/09/20 1210)  propofoL, Last Rate: Stopped (08/08/20 1900)    Scheduledalbuterol-ipratropium, 3 mL, Q6H  atorvastatin, 40 mg, Daily  ciprofloxacin HCl, 500 mg, Q12H  famotidine, 20 mg, Daily  insulin aspart U-100, 2 Units, Q4H  insulin detemir U-100, 10 Units, BID  lactulose, 15 g, TID  oxacillin 12 g in  mL CONTINUOUS INFUSION, 12 g, Q24H    PRNsodium chloride, , Q24H PRN  acetaminophen, 650 mg, Q6H PRN  dextrose 50%, 12.5 g, PRN  fentaNYL, 12.5 mcg, Q2H PRN  glucagon (human recombinant), 1 mg, PRN  insulin aspart U-100, 1-10 Units, Q4H PRN  ondansetron, 4 mg, Q8H PRN  sodium chloride 0.9%, 10 mL, PRN      Today I personally reviewed pertinent medications, lines/drains/airways, imaging, cardiology results, laboratory results, microbiology results, notably:    Diet  Diet NPO  Diet NPO        Assessment/Plan:     Neuro  Altered mental status    Multifactorial, main component for decline in LOC is non titrating propofol  Hold sedation, may restart titration to RASS -1 to -2  08/08: currently has findings suggesting a right hemiparesis, repeat head ct  08/09: etiology unclear, repeat head ct negative, EEG pending if also negative will  require MRI    Pulmonary  Acute respiratory failure with hypoxia  emergently intubated after declined while on floor in OSH  COVID19 positive  CXR/ABG daily  Will assess and wean vent as able  Fio2 40, peep 5 at this time.  CXR impoving, can begin SBT when awakens   08/07: SBT trial  08/09: repeat SBT after adequate diuresis    Renal/  HINA (acute kidney injury)  CRT 4.3, GFR 15.4  K < 5, no metabolic acidosis noted on ABG  Fio2 needs Are minimal, given COVID19   Follow I/O, making urine  Consider nephrology consult if acute changes  08/06: improving, making urine, cont ivfs  08/08: continues to improve, requires diuresis, follow uop, hold ivfs  08/09: adequate response to lasix dose yesterday, CVP down to 2-3    ID  Severe sepsis  Elevated lactic prior to arrival, will trend  Broad spec Abx continued form OSH  Pan Cx  Off Levophed for BP support  Appreciate ID imput  08/08: remains with nl white count and fever curve on continuous oxacillin and cipro    Hematology  Thrombocytopenia  Etiology again remains unclear  Although suspicion of prothrombotic state related to HIT, would keep plt count >50K in presence of argatroban  08/08: stable and above 50K, awaiting serotonin assay, cont argatroban for now    Endocrine  Hyperglycemia  ISS q 4hr  Possibly d/t steriods that were received at OSH , as HgbA1c is 5.1   Serum glucose increasing today,08/06 and is set to start peptamen intense TFs, start insulin gtt, will reassess when steroid course is completed in am  08/07: off insulin gtt, scheduled detmir/aspartate  08/08: glucose levels between 120 and 200 on current TF/insulin regimen    Other  MSSA bacteremia  vanc d/c'd, placed on continuous oxacillin and cipro  Follow up repeat blood cultures, TTE ordered  08/08: last positive culture on the 5th          The patient is being Prophylaxed for:  Venous Thromboembolism with: Chemical  Stress Ulcer with: H2B  Ventilator Pneumonia with: chlorhexidine oral care    Activity  Orders          Diet NPO: NPO starting at 08/05 0002        Full Code    Anson Matthews MD  Neurocritical Care  [facility] - [facility] WT

## 2020-08-09 NOTE — ASSESSMENT & PLAN NOTE
Multifactorial, main component for decline in LOC is non titrating propofol  Hold sedation, may restart titration to RASS -1 to -2  08/08: currently has findings suggesting a right hemiparesis, repeat head ct  08/09: etiology unclear, repeat head ct negative, EEG pending if also negative will require MRI

## 2020-08-09 NOTE — PLAN OF CARE
Problem: Wound  Goal: Optimal Wound Healing  Outcome: Ongoing, Progressing  Intervention: Promote Effective Wound Healing  Flowsheets (Taken 8/9/2020 0348)  Oral Nutrition Promotion: (TF at goal & tolerating well. residuals 5-10cc) safe use of adaptive equipment encouraged  Sleep/Rest Enhancement:   awakenings minimized   music provided   noise level reduced   relaxation techniques promoted   therapeutic touch utilized  Pain Management Interventions:   quiet environment facilitated   pillow support provided   position adjusted   care clustered     Problem: Ventilator-Induced Lung Injury (Mechanical Ventilation, Invasive)  Goal: Absence of Ventilator-Induced Lung Injury  Outcome: Ongoing, Progressing  Intervention: Facilitate Lung-Protection Measures  Flowsheets (Taken 8/9/2020 0348)  Lung Protection Measures:   fluid excess minimized   lung compliance monitored   ventilator waveforms monitored   plateau/inspiratory pressure monitored  Intervention: Prevent Ventilator-Associated Pneumonia  Flowsheets (Taken 8/9/2020 0348)  VAP Prevention Bundle:   HOB elevation maintained   oral care regularly provided   readiness to extubate assessed   stress ulcer prophylaxis provided   vent circuit breaks minimized   VTE prophylaxis provided  Head of Bed (HOB): HOB at 30 degrees  VAP Prevention Contraindications: condition unstable  VAP Prevention Measures: completed  Oral Care:   lip lubricant applied   oral rinse provided   mouth wash rinse   swabbed with antiseptic solution   teeth brushed   tongue brushed     Problem: Skin Injury Risk Increased  Goal: Skin Health and Integrity  Outcome: Ongoing, Progressing  Intervention: Optimize Skin Protection  Flowsheets (Taken 8/9/2020 0348)  Pressure Reduction Techniques:   weight shift assistance provided   pressure points protected   positioned off wounds   heels elevated off bed  Pressure Reduction Devices:   pressure-redistributing mattress utilized   positioning supports  utilized   heel offloading device utilized   foam padding utilized   specialty bed utilized   feet on footrest/footstool  Skin Protection:   tubing/devices free from skin contact   skin sealant/moisture barrier applied   skin-to-device areas padded   skin-to-skin areas padded   transparent dressing maintained   silicone foam dressing in place   pulse oximeter probe site changed   adhesive use limited   electrode sites changed   pectin skin barriers applied  Head of Bed (HOB): HOB at 30 degrees  Intervention: Promote and Optimize Oral Intake  Flowsheets (Taken 8/9/2020 6798)  Oral Nutrition Promotion: (TF at goal & tolerating well. residuals 5-10cc) safe use of adaptive equipment encouraged

## 2020-08-10 NOTE — ASSESSMENT & PLAN NOTE
vanc d/c'd, placed on continuous oxacillin and cipro  Follow up repeat blood cultures, TTE ordered  08/08: last positive culture on the 5th  08/10/2020  Continue to follow ID recs

## 2020-08-10 NOTE — PROGRESS NOTES
"[facility] - ICU [facility]  Neurocritical Care  Progress Note    Admit Date: 8/4/2020  Service Date: 08/10/2020  Length of Stay: 6    Subjective:     Chief Complaint: Acute encephalopathy    History of Present Illness:  69-year-old AA male with a PMHx of HTN and alcohol abuse who presents as transfer fro [facility]. He originally presented to the [facility] ED due shaking for 2 days. Patient was seen in the ED at [facility] for similar compliant and was discharged home on Hydroxyzine without improvement in his tremulousness. Patient reported occasional alcohol drinking, however when asked to quantify, he reported drinking at least a case of 12-bottles of beer daily for over 20 year. Unable to confirm or deny any history of withdrawal. When asked why he's in the hospital, he stated "I need to be fixed up" but did not report any specific complaint. Unable to answer living situation. Denied any F/C, HA, visual changes, rhinitis, sputum production, anosmia, ageusia, CP, SOB, N/V, abdominal pain, bleeding (hemoptysis / hematemesis, hematuria, BRBPR), C/D, or changes in urinary habits. Denied any sick contacts or recent travel. Patient stated that he does not want to drink alcohol anymore. He was admitted there and durign work-up was covid positive. Overnight he became Tachycardic (150s) with hypoxia (~80% SpO2) on NRB with Tachypnea.  Elevated D-Dimer and Troponins with EKG changes. Placed on ACS/VTE tx for PE vs MI. BIPAP palced with improvement in hypoxia 90% SpO2. Patient placed in ICU for closer monitoring and respiratory support with concerns for pending respiratory failure in the setting of PE and NSTEMI.    In the Am, he appeared more altered, and was intubated d/t concerns of resp. Failure and encephalopathy. He was started on pressors for hypotension. During this time, body jerking was noted, which prompted a CTH which showed new infarcts. He was started on Keppra and propofol, and transferred to [facility] for NCSE " eval. There was alos concerns for sepsis,  So ABX started.                Hospital Course: 8/5/2020 Arrived [facility] Covid Unit, under NCC for eval of encephalopathy, r/o status  8/6/2020: renal function improving, although HIT antibody negative, high suspicion for  HIT remains, serotonin assay sent, white count normalizing on Abx, off pressors >24 hrs, wean sedation  8/7/20: renal function gradually improving, serotonin assay pending, plt count slightly improved, afebrile, nl white count, placed on psv- possible extubation today  8/8/20: CVP increased and having more difficulties oxygenating, start diuresis with 60mg iv lasix, 40mEQ potassium po(follow K closely while being diuresed, white count and temp nl, has not sufficiently awakened off sedation since yesterday- repeat head ct  8/9/20: neuro exam mostly unchanged from yesterday, off sedation, rpeat head ct unremarkable, awaiting EEG result, may need MRI, rpeat SBT today  08/10/2020 exam unchanged, remains off of sedation, EEG unremarkable        Review of Systems   Unable to perform ROS: Intubated     Objective:     Vitals:  Temp: 97.6 °F (36.4 °C)  Pulse: 82  Rhythm: normal sinus rhythm  BP: 121/70  MAP (mmHg): 90  Resp: 16  SpO2: 96 %  Oxygen Concentration (%): 35  O2 Device (Oxygen Therapy): ventilator  Vent Mode: A/C  Set Rate: 12 BPM  Vt Set: 550 mL  PEEP/CPAP: 5 cmH20  Peak Airway Pressure: 31 cmH2O  Mean Airway Pressure: 10 cmH20  Plateau Pressure: 19 cmH20    Temp  Min: 96.9 °F (36.1 °C)  Max: 97.9 °F (36.6 °C)  Pulse  Min: 75  Max: 99  BP  Min: 110/67  Max: 166/87  MAP (mmHg)  Min: 82  Max: 119  Resp  Min: 14  Max: 27  SpO2  Min: 90 %  Max: 99 %  Oxygen Concentration (%)  Min: 35  Max: 35    08/09 0701 - 08/10 0700  In: 3400.6 [I.V.:861.8]  Out: 1500 [Urine:1400]   Unmeasured Output  Urine Occurrence: 1  Stool Occurrence: 1  Emesis Occurrence: 0  Pad Count: 1       Physical Exam  Constitutional:       General: He is not in acute distress.  HENT:      Head:  Normocephalic.   Eyes:      Pupils: Pupils are equal, round, and reactive to light.   Neck:      Musculoskeletal: Neck supple.   Cardiovascular:      Rate and Rhythm: Regular rhythm.      Pulses: Normal pulses.   Pulmonary:      Breath sounds: Normal breath sounds.   Abdominal:      General: Abdomen is flat.      Palpations: Abdomen is soft.   Musculoskeletal:         General: Swelling present.   Skin:     General: Skin is warm.      Capillary Refill: Capillary refill takes less than 2 seconds.   Neurological:      Comments: Stable level of alertness although still requires large amount of physical stimulation to open eyes  Withdrawal response present again mostly on left with widened palpebral fissure on right           Medications:  Continuousargatroban in 0.9 % sod chlor, Last Rate: 0.349 mcg/kg/min (08/09/20 1800)  propofoL, Last Rate: Stopped (08/08/20 1900)    Scheduledalbuterol-ipratropium, 3 mL, Q6H  atorvastatin, 40 mg, Daily  ciprofloxacin HCl, 500 mg, Q12H  famotidine, 20 mg, Daily  insulin aspart U-100, 2 Units, Q4H  insulin detemir U-100, 10 Units, BID  lactulose, 15 g, TID  oxacillin 12 g in  mL CONTINUOUS INFUSION, 12 g, Q24H    PRNsodium chloride, , Q24H PRN  acetaminophen, 650 mg, Q6H PRN  dextrose 50%, 12.5 g, PRN  fentaNYL, 12.5 mcg, Q2H PRN  glucagon (human recombinant), 1 mg, PRN  insulin aspart U-100, 1-10 Units, Q4H PRN  ondansetron, 4 mg, Q8H PRN  sodium chloride 0.9%, 10 mL, PRN      Today I personally reviewed pertinent medications, lines/drains/airways, imaging, cardiology results, laboratory results, microbiology results, notably:    Diet  Diet NPO  Diet NPO        Assessment/Plan:     Neuro  Altered mental status    Multifactorial, main component for decline in LOC is non titrating propofol  Hold sedation, may restart titration to RASS -1 to -2  08/08: currently has findings suggesting a right hemiparesis, repeat head ct  08/09: etiology unclear, repeat head ct negative, EEG  pending if also negative will require MRI  MULTIFACTORIAL, REMAINS OFF OF PROPOFOL    Pulmonary  Acute respiratory failure with hypoxia  emergently intubated after declined while on floor in OSH  COVID19 positive  CXR/ABG daily  Will assess and wean vent as able  Fio2 40, peep 5 at this time.  CXR impoving, can begin SBT when awakens   08/07: SBT trial  08/09: repeat SBT after adequate diuresis  08/10/2020  Neurologic status not compatible with extubation    Renal/  HINA (acute kidney injury)  CRT 4.3, GFR 15.4  K < 5, no metabolic acidosis noted on ABG  Fio2 needs Are minimal, given COVID19   Follow I/O, making urine  Consider nephrology consult if acute changes  08/06: improving, making urine, cont ivfs  08/08: continues to improve, requires diuresis, follow uop, hold ivfs  08/09: adequate response to lasix dose yesterday, CVP down to 2-3  08/10/2020  CVP stable, Cr worsening    ID  Severe sepsis  Elevated lactic prior to arrival, will trend  Broad spec Abx continued form OSH  Pan Cx  Off Levophed for BP support  Appreciate ID imput  08/08: remains with nl white count and fever curve on continuous oxacillin and cipro  Continue to follow ID recs, continue current therapy    Hematology  Thrombocytopenia  Etiology again remains unclear  Although suspicion of prothrombotic state related to HIT, would keep plt count >50K in presence of argatroban  08/08: stable and above 50K, awaiting serotonin assay, cont argatroban for now  08/10/2020  Down trending, consider hematology consult - remains on argatroban    Endocrine  Hyperglycemia  ISS q 4hr  Possibly d/t steriods that were received at OSH , as HgbA1c is 5.1   Serum glucose increasing today,08/06 and is set to start peptamen intense TFs, start insulin gtt, will reassess when steroid course is completed in am  08/07: off insulin gtt, scheduled detmir/aspartate  08/08: glucose levels between 120 and 200 on current TF/insulin regimen  Continue current  management    Other  MSSA bacteremia  vanc d/c'd, placed on continuous oxacillin and cipro  Follow up repeat blood cultures, TTE ordered  08/08: last positive culture on the 5th  08/10/2020  Continue to follow ID recs            The patient is being Prophylaxed for:  Venous Thromboembolism with: Mechanical or Chemical  Stress Ulcer with: H2B  Ventilator Pneumonia with: chlorhexidine oral care    Activity Orders          Diet NPO: NPO starting at 08/05 0002        Full Code    William Thomas MD  Neurocritical Care  [facility] - [facility]

## 2020-08-10 NOTE — ASSESSMENT & PLAN NOTE
ISS q 4hr  Possibly d/t steriods that were received at OSH , as HgbA1c is 5.1   Serum glucose increasing today,08/06 and is set to start peptamen intense TFs, start insulin gtt, will reassess when steroid course is completed in am  08/07: off insulin gtt, scheduled detmir/aspartate  08/08: glucose levels between 120 and 200 on current TF/insulin regimen  Continue current management

## 2020-08-10 NOTE — PT/OT/SLP PROGRESS
Physical Therapy      Patient Name:  Atul Mendoza   MRN:  [id number]    Patient not seen today secondary to (pt on hold 2nd to being intubated and sedated.). Will follow-up at a later date..    Jory Muse, PT   8/10/2020

## 2020-08-10 NOTE — ASSESSMENT & PLAN NOTE
CRT 4.3, GFR 15.4  K < 5, no metabolic acidosis noted on ABG  Fio2 needs Are minimal, given COVID19   Follow I/O, making urine  Consider nephrology consult if acute changes  08/06: improving, making urine, cont ivfs  08/08: continues to improve, requires diuresis, follow uop, hold ivfs  08/09: adequate response to lasix dose yesterday, CVP down to 2-3  08/10/2020  CVP stable, Cr worsening

## 2020-08-10 NOTE — ASSESSMENT & PLAN NOTE
Multifactorial, main component for decline in LOC is non titrating propofol  Hold sedation, may restart titration to RASS -1 to -2  08/08: currently has findings suggesting a right hemiparesis, repeat head ct  08/09: etiology unclear, repeat head ct negative, EEG pending if also negative will require MRI  MULTIFACTORIAL, REMAINS OFF OF PROPOFOL

## 2020-08-10 NOTE — PT/OT/SLP PROGRESS
Occupational Therapy      Patient Name:  Atul Mendoza   MRN:  [id number]    Patient not seen today secondary to remains intubated on CRRT; no plans for extubation. Will follow-up 8/12/20.    CHAD Dunn  8/10/2020

## 2020-08-10 NOTE — ASSESSMENT & PLAN NOTE
Etiology again remains unclear  Although suspicion of prothrombotic state related to HIT, would keep plt count >50K in presence of argatroban  08/08: stable and above 50K, awaiting serotonin assay, cont argatroban for now  08/10/2020  Down trending, consider hematology consult - remains on argatroban

## 2020-08-10 NOTE — ASSESSMENT & PLAN NOTE
Elevated lactic prior to arrival, will trend  Broad spec Abx continued form OSH  Pan Cx  Off Levophed for BP support  Appreciate ID imput  08/08: remains with nl white count and fever curve on continuous oxacillin and cipro  Continue to follow ID recs, continue current therapy

## 2020-08-10 NOTE — ASSESSMENT & PLAN NOTE
emergently intubated after declined while on floor in OSH  COVID19 positive  CXR/ABG daily  Will assess and wean vent as able  Fio2 40, peep 5 at this time.  CXR impoving, can begin SBT when awakens   08/07: SBT trial  08/09: repeat SBT after adequate diuresis  08/10/2020  Neurologic status not compatible with extubation

## 2020-08-10 NOTE — SUBJECTIVE & OBJECTIVE
Review of Systems   Unable to perform ROS: Intubated     Objective:     Vitals:  Temp: 97.6 °F (36.4 °C)  Pulse: 82  Rhythm: normal sinus rhythm  BP: 121/70  MAP (mmHg): 90  Resp: 16  SpO2: 96 %  Oxygen Concentration (%): 35  O2 Device (Oxygen Therapy): ventilator  Vent Mode: A/C  Set Rate: 12 BPM  Vt Set: 550 mL  PEEP/CPAP: 5 cmH20  Peak Airway Pressure: 31 cmH2O  Mean Airway Pressure: 10 cmH20  Plateau Pressure: 19 cmH20    Temp  Min: 96.9 °F (36.1 °C)  Max: 97.9 °F (36.6 °C)  Pulse  Min: 75  Max: 99  BP  Min: 110/67  Max: 166/87  MAP (mmHg)  Min: 82  Max: 119  Resp  Min: 14  Max: 27  SpO2  Min: 90 %  Max: 99 %  Oxygen Concentration (%)  Min: 35  Max: 35    08/09 0701 - 08/10 0700  In: 3400.6 [I.V.:861.8]  Out: 1500 [Urine:1400]   Unmeasured Output  Urine Occurrence: 1  Stool Occurrence: 1  Emesis Occurrence: 0  Pad Count: 1       Physical Exam  Constitutional:       General: He is not in acute distress.  HENT:      Head: Normocephalic.   Eyes:      Pupils: Pupils are equal, round, and reactive to light.   Neck:      Musculoskeletal: Neck supple.   Cardiovascular:      Rate and Rhythm: Regular rhythm.      Pulses: Normal pulses.   Pulmonary:      Breath sounds: Normal breath sounds.   Abdominal:      General: Abdomen is flat.      Palpations: Abdomen is soft.   Musculoskeletal:         General: Swelling present.   Skin:     General: Skin is warm.      Capillary Refill: Capillary refill takes less than 2 seconds.   Neurological:      Comments: Stable level of alertness although still requires large amount of physical stimulation to open eyes  Withdrawal response present again mostly on left with widened palpebral fissure on right           Medications:  Continuousargatroban in 0.9 % sod chlor, Last Rate: 0.349 mcg/kg/min (08/09/20 1800)  propofoL, Last Rate: Stopped (08/08/20 1900)    Scheduledalbuterol-ipratropium, 3 mL, Q6H  atorvastatin, 40 mg, Daily  ciprofloxacin HCl, 500 mg, Q12H  famotidine, 20 mg,  Daily  insulin aspart U-100, 2 Units, Q4H  insulin detemir U-100, 10 Units, BID  lactulose, 15 g, TID  oxacillin 12 g in  mL CONTINUOUS INFUSION, 12 g, Q24H    PRNsodium chloride, , Q24H PRN  acetaminophen, 650 mg, Q6H PRN  dextrose 50%, 12.5 g, PRN  fentaNYL, 12.5 mcg, Q2H PRN  glucagon (human recombinant), 1 mg, PRN  insulin aspart U-100, 1-10 Units, Q4H PRN  ondansetron, 4 mg, Q8H PRN  sodium chloride 0.9%, 10 mL, PRN      Today I personally reviewed pertinent medications, lines/drains/airways, imaging, cardiology results, laboratory results, microbiology results, notably:    Diet  Diet NPO  Diet NPO

## 2020-08-11 PROBLEM — Z51.5 PALLIATIVE CARE ENCOUNTER: Status: ACTIVE | Noted: 2020-01-01

## 2020-08-11 NOTE — CONSULTS
[facility] - ICU 16 WT  Palliative Medicine  Consult Note    Patient Name: Atul Mendoza  MRN: [id number]  Admission Date: 8/4/2020  Hospital Length of Stay: 7 days  Code Status: DNR   Attending Provider: William Thomas MD  Consulting Provider: Aleena Doan MD  Primary Care Physician: Venice Hernandez MD  Principal Problem:Acute encephalopathy    Patient information was obtained from relative(s) and ER records.      Inpatient consult to Palliative Care  Consult performed by: Aleena Doan MD  Consult ordered by: William Thomas MD        Assessment/Plan:     Palliative care encounter  69 year old male with PMH HTN and alcohol abuse presents with acute encephalopathy, new brain infarcts, COVID 19, acute hypoxic respiratory failure, severe sepsis and HINA. Patient remains intubated with poor neuro exam on no sedation. Prognosis is very poor without chance for meaningful recovery. Palliative consulted for C     GOC/ACP   -Spoke with patient's sister Meghana and niece Mikala. Patient's brother unable to fully participate in conversation due to dementia. Discussed patient's prognosis and options moving forward. All in agreement that compassionately extubating is the best way to take care of the patient. Discussed process. Discussed comfort medications. Discussed possibly transferring to inpatient hospice if patient lives longer than expected.   -Code status changed to DNR. Plans for compassionate extubation following brother facetiming with patient to say goodbye       Discussed with primary team     25 minutes spent discussing ACP         Thank you for your consult. I will sign off. Please contact us if you have any additional questions.    Subjective:     HPI:   69-year-old male with a PMHx of HTN and alcohol abuse presented as a transfer fro [facility]. Patient is COVID+ with severe sepsis. Patient was intubated for acute hypoxic respiratory failure and airway protection. CT head showed  multiple new infarcts. Patient with poor neuro exam on no sedation. Remains on the vent with no chance of meaningful recovery per primary team. Palliative consulted to assist with [facility]          Hospital Course:  No notes on file        Past Medical History:   Diagnosis Date    Hypertension        Past Surgical History:   Procedure Laterality Date    CATARACT EXTRACTION W/  INTRAOCULAR LENS IMPLANT Right 12/12/2019    Procedure: EXTRACTION, CATARACT, WITH IOL INSERTION;  Surgeon: Alcides Holcomb MD;  Location: [facility];  Service: Ophthalmology;  Laterality: Right;    PHACOEMULSIFICATION OF CATARACT Right 12/12/2019    Procedure: PHACOEMULSIFICATION, CATARACT;  Surgeon: Alcides Holcomb MD;  Location: [facility];  Service: Ophthalmology;  Laterality: Right;       Review of patient's allergies indicates:  No Known Allergies    Medications:  Continuous Infusions:   morphine 9 mg/hr (08/11/20 1800)     Scheduled Meds:  PRN Meds:acetaminophen, atropine 1%, lorazepam    Family History     Problem Relation (Age of Onset)    Heart attack Father        Tobacco Use    Smoking status: Never Smoker    Smokeless tobacco: Never Used   Substance and Sexual Activity    Alcohol use: Yes     Comment: social    Drug use: Never    Sexual activity: Not on file       Review of Systems   Unable to perform ROS: Intubated     Objective:     Vital Signs (Most Recent):  Temp: 98 °F (36.7 °C) (08/11/20 1400)  Pulse: 89 (08/11/20 1400)  Resp: (!) 25 (08/11/20 1800)  BP: 106/66 (08/11/20 1400)  SpO2: 96 % (08/11/20 1400) Vital Signs (24h Range):  Temp:  [97 °F (36.1 °C)-100 °F (37.8 °C)] 98 °F (36.7 °C)  Pulse:  [] 89  Resp:  [15-38] 25  SpO2:  [86 %-100 %] 96 %  BP: (101-133)/(64-81) 106/66  Arterial Line BP: (106-142)/(54-68) 106/55     Weight: 79.3 kg (174 lb 13.2 oz)  Body mass index is 23.71 kg/m².    Physical Exam  Deferred due to covid 19     Advance Care Planning   Review of Symptoms    Symptom Assessment (ESAS 0-10 Scale)  "    Constipation:  Negative  Diarrhea:  Negative          OME in 24 hours:  Sporadic fentanyl 12.5mcg pushes     Performance Status:  10    Advanced Directives:  Living Will: No    LaPOST: No    Do Not Resuscitate Status: No    Medical Power of : No (legal surrogate decision makers are patient's sister and brother)      Decision Making:  Family answered questions    Living Arrangements:  Lives with family    Psychosocial/Cultural:  Per sister patient is a "character"     Spiritual:  F - Trina and Belief:  Oriental orthodox  I - Importance:  Important to patient's sister          Significant Labs: All pertinent labs within the past 24 hours have been reviewed.  CBC:   Recent Labs   Lab 08/11/20  0352   WBC 10.74   HGB 11.6*   HCT 34.7*   MCV 98   PLT 36*     BMP:  Recent Labs   Lab 08/11/20 0352   *  158*   *  154*   K 3.4*  3.4*   *  123*   CO2 19*  19*   BUN 84*  84*   CREATININE 2.5*  2.5*   CALCIUM 8.6*  8.6*   MG 2.5     LFT:  Lab Results   Component Value Date    AST 50 (H) 08/11/2020    ALKPHOS 68 08/11/2020    BILITOT 0.7 08/11/2020     Albumin:   Albumin   Date Value Ref Range Status   08/11/2020 1.2 (L) 3.5 - 5.2 g/dL Final   08/11/2020 1.2 (L) 3.5 - 5.2 g/dL Final     Protein:   Total Protein   Date Value Ref Range Status   08/11/2020 5.8 (L) 6.0 - 8.4 g/dL Final     Lactic acid:   Lab Results   Component Value Date    LACTATE 1.7 08/08/2020    LACTATE 1.7 08/07/2020       Significant Imaging: I have reviewed all pertinent imaging results/findings within the past 24 hours.          Aleena Doan MD  Palliative Medicine  [facility] - ICU 16 WT            "

## 2020-08-11 NOTE — PLAN OF CARE
Patient's chart was reviewed by a stroke team provider.    Patient intubated, sedated, transitioning to comfort care, and non neurologic issues with greater urgency.  There is no new imaging to review.      For other recommendations please see our previous note completed on: 8/5. Recommend discontinuing statin at this time given current LDL and elevated LFTs.  There are, otherwise, no new recommendations at this time. Will continue to follow. Discussed patient with staff. Please contact stroke team for any questions or concerns.    Stewart Bates MD  Vascular Neurology

## 2020-08-11 NOTE — HPI
69-year-old male with a PMHx of HTN and alcohol abuse presented as a transfer fro [facility]. Patient is COVID+ with severe sepsis. Patient was intubated for acute hypoxic respiratory failure and airway protection. CT head showed multiple new infarcts. Patient with poor neuro exam on no sedation. Remains on the vent with no chance of meaningful recovery per primary team. Palliative consulted to assist with GOC

## 2020-08-11 NOTE — SUBJECTIVE & OBJECTIVE
Past Medical History:   Diagnosis Date    Hypertension        Past Surgical History:   Procedure Laterality Date    CATARACT EXTRACTION W/  INTRAOCULAR LENS IMPLANT Right 12/12/2019    Procedure: EXTRACTION, CATARACT, WITH IOL INSERTION;  Surgeon: Alcides Holcomb MD;  [facility];  Service: Ophthalmology;  Laterality: Right;    PHACOEMULSIFICATION OF CATARACT Right 12/12/2019    Procedure: PHACOEMULSIFICATION, CATARACT;  Surgeon: Alcides Holcomb MD;  Location: [facility] OR;  Service: Ophthalmology;  Laterality: Right;       Review of patient's allergies indicates:  No Known Allergies    Medications:  Continuous Infusions:   morphine 9 mg/hr (08/11/20 1800)     Scheduled Meds:  PRN Meds:acetaminophen, atropine 1%, lorazepam    Family History     Problem Relation (Age of Onset)    Heart attack Father        Tobacco Use    Smoking status: Never Smoker    Smokeless tobacco: Never Used   Substance and Sexual Activity    Alcohol use: Yes     Comment: social    Drug use: Never    Sexual activity: Not on file       Review of Systems   Unable to perform ROS: Intubated     Objective:     Vital Signs (Most Recent):  Temp: 98 °F (36.7 °C) (08/11/20 1400)  Pulse: 89 (08/11/20 1400)  Resp: (!) 25 (08/11/20 1800)  BP: 106/66 (08/11/20 1400)  SpO2: 96 % (08/11/20 1400) Vital Signs (24h Range):  Temp:  [97 °F (36.1 °C)-100 °F (37.8 °C)] 98 °F (36.7 °C)  Pulse:  [] 89  Resp:  [15-38] 25  SpO2:  [86 %-100 %] 96 %  BP: (101-133)/(64-81) 106/66  Arterial Line BP: (106-142)/(54-68) 106/55     Weight: 79.3 kg (174 lb 13.2 oz)  Body mass index is 23.71 kg/m².    Physical Exam  Deferred due to covid 19     Advance Care Planning   Review of Symptoms    Symptom Assessment (ESAS 0-10 Scale)     Constipation:  Negative  Diarrhea:  Negative          OME in 24 hours:  Sporadic fentanyl 12.5mcg pushes     Performance Status:  10    Advanced Directives:  Living Will: No    LaPOST: No    Do Not Resuscitate Status: No    Medical  "Power of : No (legal surrogate decision makers are patient's sister and brother)      Decision Making:  Family answered questions    Living Arrangements:  Lives with family    Psychosocial/Cultural:  Per sister patient is a "character"     Spiritual:  F - Trina and Belief:  Restorationism  I - Importance:  Important to patient's sister          Significant Labs: All pertinent labs within the past 24 hours have been reviewed.  CBC:   Recent Labs   Lab 08/11/20  0352   WBC 10.74   HGB 11.6*   HCT 34.7*   MCV 98   PLT 36*     BMP:  Recent Labs   Lab 08/11/20  0352   *  158*   *  154*   K 3.4*  3.4*   *  123*   CO2 19*  19*   BUN 84*  84*   CREATININE 2.5*  2.5*   CALCIUM 8.6*  8.6*   MG 2.5     LFT:  Lab Results   Component Value Date    AST 50 (H) 08/11/2020    ALKPHOS 68 08/11/2020    BILITOT 0.7 08/11/2020     Albumin:   Albumin   Date Value Ref Range Status   08/11/2020 1.2 (L) 3.5 - 5.2 g/dL Final   08/11/2020 1.2 (L) 3.5 - 5.2 g/dL Final     Protein:   Total Protein   Date Value Ref Range Status   08/11/2020 5.8 (L) 6.0 - 8.4 g/dL Final     Lactic acid:   Lab Results   Component Value Date    LACTATE 1.7 08/08/2020    LACTATE 1.7 08/07/2020       Significant Imaging: I have reviewed all pertinent imaging results/findings within the past 24 hours.  "

## 2020-08-11 NOTE — ASSESSMENT & PLAN NOTE
69 year old male with PMH HTN and alcohol abuse presents with acute encephalopathy, new brain infarcts, COVID 19, acute hypoxic respiratory failure, severe sepsis and HINA. Patient remains intubated with poor neuro exam on no sedation. Prognosis is very poor without chance for meaningful recovery. Palliative consulted for GOC     GOC/ACP   -Spoke with patient's sister Meghana and niece Mikala. Patient's brother unable to fully participate in conversation due to dementia. Discussed patient's prognosis and options moving forward. All in agreement that compassionately extubating is the best way to take care of the patient. Discussed process. Discussed comfort medications. Discussed possibly transferring to inpatient hospice if patient lives longer than expected.   -Code status changed to DNR. Plans for compassionate extubation following brother facetiming with patient to say goodbye       Discussed with primary team     25 minutes spent discussing ACP

## 2020-08-11 NOTE — SIGNIFICANT EVENT
Spoke with patient's sister, Meghana Amos, and she would like to proceed with comfort care. Patient's Brother has significant dementia but would like to say goodbye via videochat and we are facilitating that. Patient's niece, Mikala Dewitt is reportedly in agreement with the plan to proceed with comfort care. DNR and comfort care orders placed.

## 2020-08-12 NOTE — PT/OT/SLP PROGRESS
Occupational Therapy      Patient Name:  Atul Mendoza   MRN:  [id number]    Patient not seen today secondary to pt expiration this am.    Jada Colmenares, LOTR  8/12/2020

## 2020-08-12 NOTE — SIGNIFICANT EVENT
Death Note    I was called to room by nurse, who had noted Asystole on the monitor    On exam, the patient was unresponsive to verbal and tactile stimuli. There were no radial or carotid pulses. Chest rise and breath and heart sounds were absent. Pupils were fixed and dilated. Pupillary, corneal, gag, and oculocephalic reflexes were absent.     I pronounced the patient dead at 6:17 AM. Preliminary cause of death: Acute Respiratory Failure with Hypoxia    The family has been informed of the death and condolences given. The  has been summoned for further support.     Arlen Grijalva DO  Internal Medicine, PGY 2  Critical Care Service

## 2020-08-12 NOTE — PLAN OF CARE
Patient , Time of death .    Sussy Mckeon RN CM  [id number]       20 1038   Final Note   Assessment Type Final Discharge Note   Anticipated Discharge Disposition   (Time of death )   Hospital Follow Up  Appt(s) scheduled? No  (Time of death )   Discharge plans and expectations educations in teach back method with documentation complete? No  (Time of death )   Post-Acute Status   Other Status No Post-Acute Service Needs

## 2020-08-12 NOTE — NURSING
Pt became bradycardic then asystole at 0600. Dr. Arlen Grijalva was notified to pronounce pt death. TOD 0617. Sister notified by Robin Camejo. I also notified the sister

## 2020-08-12 NOTE — DISCHARGE SUMMARY
Death Note  Critical Care Medicine      Admit Date: 2020    Date of Death: 2020    Time of Death: 617    Attending Physician: William Thomas MD    Principal Diagnoses: Acute encephalopathy    Preliminary Cause of Death: Acute encephalopathy    Secondary Diagnoses:   Active Hospital Problems    Diagnosis  POA    *Acute encephalopathy [G93.40]  Yes    Palliative care encounter [Z51.5]  Not Applicable    E coli bacteremia [R78.81]  Yes    ST elevation myocardial infarction (STEMI) [I21.3]  Yes    Hyperglycemia [R73.9]  Yes    Hypotension due to hypovolemia [I95.89, E86.1]  Yes    Acute respiratory failure with hypoxia [J96.01]  Yes    Portal vein thrombosis [I81]  Yes    Severe sepsis [A41.9, R65.20]  Yes    Lacunar stroke [I63.81]  Yes    Hypercoagulopathy [D68.59]  Yes    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   -West Nile Ab pending      HINA (acute kidney injury) [N17.9]  Yes    COVID-19 virus infection [U07.1]  Yes      Resolved Hospital Problems   No resolved problems to display.        Discharged Condition:     HPI:   69-year-old AA male with a PMHx of HTN and alcohol abuse who presents as transfer fro [facility]. [facility] due shaking for 2 days. Patient was seen in the ED at [facility] for similar compliant and was discharged home on Hydroxyzine without improvement in his tremulousness. Patient reported occasional alcohol drinking, however when asked to quantify, he reported drinking at least a case of 12-bottles of beer daily for over 20 year. Unable to confirm or deny any history of withdrawal. When asked why he's in the  "hospital, he stated "I need to be fixed up" but did not report any specific complaint. Unable to answer living situation. Denied any F/C, HA, visual changes, rhinitis, sputum production, anosmia, ageusia, CP, SOB, N/V, abdominal pain, bleeding (hemoptysis / hematemesis, hematuria, BRBPR), C/D, or changes in urinary habits. Denied any sick contacts or recent travel. Patient stated that he does not want to drink alcohol anymore. He was admitted there and durign work-up was covid positive. Overnight he became Tachycardic (150s) with hypoxia (~80% SpO2) on NRB with Tachypnea.  Elevated D-Dimer and Troponins with EKG changes. Placed on ACS/VTE tx for PE vs MI. BIPAP palced with improvement in hypoxia 90% SpO2. Patient placed in ICU for closer monitoring and respiratory support with concerns for pending respiratory failure in the setting of PE and NSTEMI.    In the Am, he appeared more altered, and was intubated d/t concerns of resp. Failure and encephalopathy. He was started on pressors for hypotension. During this time, body jerking was noted, which prompted a CTH which showed new infarcts. He was started on Keppra and propofol, and transferred to [facility] for NCSE eval. There was alos concerns for sepsis,  So ABX started.                Hospital/ICU Course:  8/5/2020 Arrived [facility] Covid Unit, under N[facility] for eval of encephalopathy, r/o status  8/6/2020: renal function improving, although HIT antibody negative, high suspicion for  HIT remains, serotonin assay sent, white count normalizing on Abx, off pressors >24 hrs, wean sedation  8/7/20: renal function gradually improving, serotonin assay pending, plt count slightly improved, afebrile, nl white count, placed on psv- possible extubation today  8/8/20: CVP increased and having more difficulties oxygenating, start diuresis with 60mg iv lasix, 40mEQ potassium po(follow K closely while being diuresed, white count and temp nl, has not sufficiently awakened off sedation since " yesterday- repeat head ct  8/9/20: neuro exam mostly unchanged from yesterday, off sedation, rpeat head ct unremarkable, awaiting EEG result, may need MRI, rpeat SBT today  08/10/2020 exam unchanged, remains off of sedation, EEG unremarkable  8/11/2020: transitioned to comfort care after discussion with family         Consultations were held with the family regarding the patient's expected poor prognosis. At the direction of the family, the patient was extubated  and measures to ensure the comfort of the patient including, but not limited to, morphine as needed for pain and air hunger as well as benzodiazepines as needed for agitation. The patient was subsequently declared dead.

## 2020-08-12 NOTE — NURSING
Report received. Pt on comfort care. Morphine infusing. Vitals stable. Ventilator on spontaneous setting. Pt resting comfortably does not appear to be in distress. Will continue to monitor.

## 2020-08-13 LAB — BACTERIA BLD CULT: NORMAL

## 2021-12-02 NOTE — HPI
" 69-year-old AA male with a PMHx of HTN and alcohol abuse who presents as transfer fro [facility]. [facility] due [facility] for 2 days. Patient was seen in the ED at [facility] for similar compliant and was discharged home on Hydroxyzine without improvement in his tremulousness. Patient reported occasional alcohol drinking, however when asked to quantify, he reported drinking at least a case of 12-bottles of beer daily for over 20 year. Unable to confirm or deny any history of withdrawal. When asked why he's in the hospital, he stated "I need to be fixed up" but did not report any specific complaint. Unable to answer living situation. Denied any F/C, HA, visual changes, rhinitis, sputum production, anosmia, ageusia, CP, SOB, N/V, abdominal pain, bleeding (hemoptysis / hematemesis, hematuria, BRBPR), C/D, or changes in urinary habits. Denied any sick contacts or recent travel. Patient stated that he does not want to drink alcohol anymore. He was admitted there and durign work-up was covid positive. Overnight he became Tachycardic (150s) with hypoxia (~80% SpO2) on NRB with Tachypnea.  Elevated D-Dimer and Troponins with EKG changes. Placed on ACS/VTE tx for PE vs MI. BIPAP palced with improvement in hypoxia 90% SpO2. Patient placed in ICU for closer monitoring and respiratory support with concerns for pending respiratory failure in the setting of PE and NSTEMI.    In the Am, he appeared more altered, and was intubated d/t concerns of resp. Failure and encephalopathy. He was started on pressors for hypotension. During this time, body jerking was noted, which prompted a CTH which showed new infarcts. He was started on Keppra and propofol, and transferred to [facility] for NCSE eval. There was alos concerns for sepsis,  So ABX started.              " Here for TB  Reading  TB           left      forearm        0    mm induration,  Result negative   Documentation given

## (undated) DEVICE — SET EXT W/2 VLVE PORTS 40

## (undated) DEVICE — SHIELD EYE PLASTIC 3100G

## (undated) DEVICE — SYR 30CC LUER LOCK

## (undated) DEVICE — PACK EYE

## (undated) DEVICE — TAPE SURG TRANSPORE 1 SNG USE

## (undated) DEVICE — CARTRIDGE PHACO INSERT

## (undated) DEVICE — CASSETTE INFINITI